# Patient Record
Sex: FEMALE | Race: WHITE | NOT HISPANIC OR LATINO | Employment: FULL TIME | URBAN - METROPOLITAN AREA
[De-identification: names, ages, dates, MRNs, and addresses within clinical notes are randomized per-mention and may not be internally consistent; named-entity substitution may affect disease eponyms.]

---

## 2017-08-22 ENCOUNTER — TRANSCRIBE ORDERS (OUTPATIENT)
Dept: GYNECOLOGIC ONCOLOGY | Facility: CLINIC | Age: 47
End: 2017-08-22

## 2017-08-22 DIAGNOSIS — Z12.31 VISIT FOR SCREENING MAMMOGRAM: Primary | ICD-10-CM

## 2017-09-06 ENCOUNTER — APPOINTMENT (OUTPATIENT)
Dept: MAMMOGRAPHY | Facility: HOSPITAL | Age: 47
End: 2017-09-06

## 2017-09-13 ENCOUNTER — OFFICE VISIT (OUTPATIENT)
Dept: OBSTETRICS AND GYNECOLOGY | Facility: CLINIC | Age: 47
End: 2017-09-13

## 2017-09-13 VITALS — BODY MASS INDEX: 23.98 KG/M2 | RESPIRATION RATE: 14 BRPM | HEIGHT: 66 IN | WEIGHT: 149.2 LBS

## 2017-09-13 DIAGNOSIS — L65.8 FEMALE PATTERN HAIR LOSS: Primary | ICD-10-CM

## 2017-09-13 PROCEDURE — 99214 OFFICE O/P EST MOD 30 MIN: CPT | Performed by: NURSE PRACTITIONER

## 2017-09-13 RX ORDER — SPIRONOLACTONE 25 MG/1
TABLET ORAL
Refills: 2 | COMMUNITY
Start: 2017-07-25 | End: 2022-07-19

## 2017-09-20 NOTE — PROGRESS NOTES
[facility] FO[facility]UP    Francesca Manning  [id number]  1970    Chief Complaint: Follow-up (Hair loss)        History of present illness:  Francesca Manning is a 46 y.o. year old female who is here today for c/o hair loss. She is a previous patient of Dr. Matilde Marquez, last seen for annual exam on 2016.   Francesca reports large amounts of hair loss that she first noticed in 10/2016. She suspects this was a side effect of Topamax, which she started on in 2016 for management of her migraines. She states she weaned off of the medication after the increased hair loss started and was evaluated by her neurologist, dermatologist, and integrative medicine providers. She has had several series of blood work drawn by these various providers starting in 10/2016 and has brought these labs with her for our review today. She had a low-normal TSH level at the end of , but this has since improved. She has been evaluated for thyroid dysfunction, anemia, and abnormal hormone levels. She was started on B12 injections and daily vitamin D supplementation following abnormal findings.   1 months ago she was prescribed spironolactone and Biotin for management of her hair loss by her dermatologist. She has taken this as instructed, but states she feels she was not adequately educated regarding this medication. She admits that she has noticed less hair falling out over the last 2 weeks. She denies hair loss in specific clumps.    Past Medical History:   Diagnosis Date   • Migraines        Past Surgical History:   Procedure Laterality Date   •  SECTION  2010    Boy: Hitesh   •  SECTION PRIMARY  2002    Boy: [place]   • CYSTOSCOPY      X 2   • EYE SURGERY         MEDICATIONS: The current medication list was reviewed and reconciled.     Allergies:  is allergic to sulfa antibiotics.    Family History   Problem Relation Age of Onset   • Breast cancer Father    • Diabetes Father    • Diabetes Paternal  "Grandmother    • Coronary artery disease Paternal Grandfather    • Stroke Paternal Grandfather    • Diabetes Maternal Grandmother    • Ovarian cancer Maternal Grandmother        Review of Systems   Constitutional: Negative for appetite change, chills, fatigue, fever and unexpected weight change.   HENT:        +hair loss x 1 year   Respiratory: Negative for cough, shortness of breath and wheezing.    Cardiovascular: Negative for chest pain, palpitations and leg swelling.   Gastrointestinal: Negative for abdominal distention, abdominal pain, blood in stool, constipation, diarrhea, nausea and vomiting.   Endocrine: Negative.    Genitourinary: Negative for dyspareunia, dysuria, frequency, genital sores, hematuria, pelvic pain, urgency, vaginal bleeding, vaginal discharge and vaginal pain.   Musculoskeletal: Negative for arthralgias, gait problem and joint swelling.   Neurological: Negative for dizziness, seizures, syncope, weakness, light-headedness, numbness and headaches.   Hematological: Negative for adenopathy.   Psychiatric/Behavioral: Negative.        Physical Exam  Vital Signs: Resp 14  Ht 66\" (167.6 cm)  Wt 149 lb 3.2 oz (67.7 kg)  LMP 07/16/2017 (Exact Date) Comment: Every 3 months  Breastfeeding? No  BMI 24.08 kg/m2   General Appearance:  alert, cooperative, no apparent distress, appears stated age and normal weight   Neurologic/Psychiatric: A&O x 3, gait steady, appropriate affect   HEENT:  Normocephalic, without obvious abnormality, mucous membranes moist. Hair mildly thick, thinner on top than at back, no evidence of bald patches.   Lungs:   Clear to auscultation bilaterally; respirations regular, even, and unlabored bilaterally   Heart:  Regular rate and rhythm, no murmurs appreciated   Abdomen:   Soft, non-tender, non-distended and no organomegaly   Lymph nodes: No cervical, supraclavicular, inguinal or axillary adenopathy noted   Extremities: Normal, atraumatic; no clubbing, cyanosis, or edema  "       Procedure Notes:  No notes on file    Assessment and Plan:    Francesca was seen today for follow-up.    Diagnoses and all orders for this visit:    Female pattern hair loss      Francesca and I discussed that her symptoms are consistent with female pattern hair loss. Topamax does have a listed side effect of alopecia, so it is possible that this started or worsened this pattern. Her low TSH may have also contributed at that time. I typically recommend starting spironolactone and Biotin, which she has been taking already x1 month. She has noticed slowing of her hair loss over the last 2 weeks and I reassured her that it seems her current treatment is helping.   We reviewed activity of spironolactone, risks, benefits, and side effects. Refills are given. We may consider Rogaine treatment if symptoms persist. We also discussed her normal life cycle for hair. It may take up to 3 months for her to start seeing new hair growth. She v/u.   She will be due for her annual exam in 3-4 months. We will re-evaluate her symptoms and management at that time.     This was a 30 minute visit with 20 minutes spent in direct face-to-face discussion of symptoms, review of outside labs, and treatment discussion.     Return for annual exam or PRN.      TERE Mcmahan      Note: Speech recognition transcription software was used to dictate portions of this document.  An attempt at proofreading has been made though minor errors in transcription may still be present.  Please do not hesitate to call our office with any questions.

## 2017-10-02 ENCOUNTER — APPOINTMENT (OUTPATIENT)
Dept: MAMMOGRAPHY | Facility: HOSPITAL | Age: 47
End: 2017-10-02

## 2017-10-20 ENCOUNTER — HOSPITAL ENCOUNTER (OUTPATIENT)
Dept: MAMMOGRAPHY | Facility: HOSPITAL | Age: 47
Discharge: HOME | End: 2017-10-20
Admitting: NURSE PRACTITIONER

## 2017-10-20 DIAGNOSIS — Z12.31 VISIT FOR SCREENING MAMMOGRAM: ICD-10-CM

## 2017-10-20 PROCEDURE — 77063 BREAST TOMOSYNTHESIS BI: CPT

## 2017-10-20 PROCEDURE — G0202 SCR MAMMO BI INCL CAD: HCPCS

## 2017-10-23 PROCEDURE — 77063 BREAST TOMOSYNTHESIS BI: CPT | Performed by: RADIOLOGY

## 2017-10-23 PROCEDURE — 77067 SCR MAMMO BI INCL CAD: CPT | Performed by: RADIOLOGY

## 2018-01-03 ENCOUNTER — OFFICE VISIT (OUTPATIENT)
Dept: OBSTETRICS AND GYNECOLOGY | Facility: CLINIC | Age: 48
End: 2018-01-03

## 2018-01-03 VITALS
WEIGHT: 143.2 LBS | OXYGEN SATURATION: 99 % | RESPIRATION RATE: 14 BRPM | HEIGHT: 66 IN | BODY MASS INDEX: 23.01 KG/M2 | DIASTOLIC BLOOD PRESSURE: 80 MMHG | HEART RATE: 106 BPM | SYSTOLIC BLOOD PRESSURE: 120 MMHG

## 2018-01-03 DIAGNOSIS — Z30.41 ENCOUNTER FOR SURVEILLANCE OF CONTRACEPTIVE PILLS: ICD-10-CM

## 2018-01-03 DIAGNOSIS — L65.9 HAIR LOSS: ICD-10-CM

## 2018-01-03 DIAGNOSIS — Z01.419 WELL WOMAN EXAM WITH ROUTINE GYNECOLOGICAL EXAM: Primary | ICD-10-CM

## 2018-01-03 PROCEDURE — 99396 PREV VISIT EST AGE 40-64: CPT | Performed by: NURSE PRACTITIONER

## 2018-01-03 RX ORDER — BUPROPION HYDROCHLORIDE 150 MG/1
TABLET ORAL
Refills: 0 | COMMUNITY
Start: 2017-12-26 | End: 2022-06-16

## 2018-01-03 RX ORDER — LEVONORGESTREL AND ETHINYL ESTRADIOL 0.15-0.03
1 KIT ORAL DAILY
Qty: 91 TABLET | Refills: 3 | Status: SHIPPED | OUTPATIENT
Start: 2018-01-03 | End: 2019-01-07 | Stop reason: SDUPTHER

## 2018-01-03 RX ORDER — FERROUS SULFATE 325(65) MG
325 TABLET ORAL
COMMUNITY

## 2018-01-03 NOTE — PROGRESS NOTES
[facility] ANNUAL WELL [facility]OMAN VISIT      Francesca Manning  [id number]  1970      Chief Complaint: Gynecologic Exam        History of present illness:    Francesca Manning is a 47 y.o. year old  presenting to be seen for her annual exam. She was last seen in 2017 for c/o hair loss and had just started treatment with spironolactone. Upon arrival today she reports she has been using the medication regularly, is taking Biotin, and is using a shampoo daily to encourage hair growth. She has seen a decrease in her hair loss and is now noticing new growth. She is happy with her current regimen and plans to continue this. She is on a new anxiety medication she is pleased with and due to follow-up for this next week.   She states she is in need of repeat routine blood work. She did not do her B12 injection last week, but has continued her daily vitamins. She reports she is feeling generally well today and has no new complaints. She denies vaginal bleeding, pelvic pain, changes in bowel or bladder function, new or concerning lesions, and breast problems. She is in need of a pap smear today. Her mammogram is currently UTD.        SEXUAL Hx:  She is currently sexually active.  In the past year there has not been new sexual partners.    Condoms are not typically used.  She would not like to be screened for STD's at today's exam.  Current birth control method: OCP (estrogen/progesterone), extended.   She is happy with her current method of contraception and does not want to discuss alternative methods of contraception.  MENSTRUAL Hx:  Patient's last menstrual period was 10/03/2017 (approximate).  In the past 6 months her cycles have been regular, predictable and occur monthly.   Her menstrual flow is normal.   Each month on average there are roughly 0 days of very heavy flow.    Intermenstrual bleeding is absent.    Post-coital bleeding is absent.  Dysmenorrhea: is not affecting her activities of daily  living  PMS: is not affecting her activities of daily living  Her cycles are not a source of concern for her that she wishes to discuss today.  HEALTH Hx:  She exercises regularly: no (and has no plans to become more active).  She wears her seat belt:yes.  She has concerns about domestic violence: no.  She is a non-smoker.      Past Medical History:   Diagnosis Date   • Migraines        Past Surgical History:   Procedure Laterality Date   •  SECTION  2010    Boy: Hitesh   •  SECTION PRIMARY  2002    Boy: [place]   • CYSTOSCOPY      X 2   • EYE SURGERY         MEDICATIONS: The current medication list was reviewed and reconciled.     Allergies:  is allergic to sulfa antibiotics.    Family History   Problem Relation Age of Onset   • Breast cancer Father 68   • Diabetes Father    • Diabetes Paternal Grandmother    • Coronary artery disease Paternal Grandfather    • Stroke Paternal Grandfather    • Diabetes Maternal Grandmother    • Ovarian cancer Maternal Grandmother        Health Maintenance:  Last mammogram was 10/20/2017. Last pap smear was 2014, results were  normal PAP..    Review of Systems   Constitutional: Negative for fatigue, fever and unexpected weight change.   HENT: Negative for congestion, ear pain, hearing loss, sinus pressure and trouble swallowing.    Eyes: Negative for visual disturbance.   Respiratory: Negative for cough, chest tightness, shortness of breath and wheezing.    Cardiovascular: Negative for chest pain, palpitations and leg swelling.   Gastrointestinal: Negative for abdominal distention, abdominal pain, constipation, diarrhea, nausea and vomiting.   Endocrine: Negative for cold intolerance, heat intolerance, polydipsia, polyphagia and polyuria.   Genitourinary: Negative for difficulty urinating, dyspareunia, dysuria, frequency, hematuria, pelvic pain, urgency, vaginal bleeding, vaginal discharge and vaginal pain.   Musculoskeletal: Negative for arthralgias,  "gait problem, joint swelling and myalgias.   Skin: Negative for color change, pallor and rash.   Neurological: Negative for dizziness, seizures, syncope, weakness, light-headedness, numbness and headaches.   Hematological: Negative for adenopathy. Does not bruise/bleed easily.   Psychiatric/Behavioral: Negative for agitation, confusion, sleep disturbance and suicidal ideas. The patient is not nervous/anxious.        Physical Exam  Vital Signs: /80  Pulse 106  Resp 14  Ht 167.6 cm (66\")  Wt 65 kg (143 lb 3.2 oz)  LMP 10/03/2017 (Approximate)  SpO2 99%  Breastfeeding? No  BMI 23.11 kg/m2   General Appearance:  alert, cooperative, no apparent distress, appears stated age and normal weight   Neurologic/Psychiatric: A&O x 3, gait steady, appropriate affect   HEENT:  Normocephalic, without obvious abnormality, mucous membranes moist. New hair growth noted throughout, but especially to top of head, improved from last exam   Neck: Supple, symmetrical, trachea midline, no adenopathy;  No thyromegaly, masses, or tenderness   Back:   Symmetric, no curvature, ROM normal, no CVA tenderness   Lungs:   Clear to auscultation bilaterally; respirations regular, even, and unlabored bilaterally   Heart:  Regular rate and rhythm, no murmurs appreciated   Breasts:  Symmetrical, no masses, no lesions and no nipple discharge   Abdomen:   Soft, non-tender, non-distended and no organomegaly   Lymph nodes: No cervical, supraclavicular, inguinal or axillary adenopathy noted   Extremities: Normal, atraumatic; no clubbing, cyanosis, or edema    Skin: No rashes, ulcers, or suspicious lesions noted   Pelvic: External Genitalia  without lesions or skin changes  Vagina  is pink, moist, without lesions.   Cervix  normal, without lesions, no discharge, no CMT and pap obtained  Uterus  normal size, midposition, mobile and nontender  Ovaries  without palpable masses or fullness  Parametria  smooth  Rectovaginal  Female rectovaginal: " deferred       Procedure Note:  No notes on file    Assessment and Plan:    Francesca was seen today for gynecologic exam.    Diagnoses and all orders for this visit:    Well woman exam with routine gynecological exam  -     Pap IG, HPV-hr - ThinPrep Vial, Cervix; Future  -     Testosterone, Free, Total; Future  -     TSH; Future  -     Vitamin D 25 Hydroxy; Future  -     Vitamin B12; Future  -     Hemoglobin A1c; Future  -     CBC & Differential; Future  -     Comprehensive Metabolic Panel; Future  -     Pap IG, HPV-hr - ThinPrep Vial, Cervix    Hair loss  Comments:  improving on current regimen    Encounter for surveillance of contraceptive pills  -     levonorgestrel-ethinyl estradiol (JOLESSA) 0.15-0.03 MG per tablet; Take 1 tablet by mouth Daily.        Pap was done today.  If she does not receive the results of the Pap within 2 weeks  time, she was instructed to call to find out the results.  I explained to Francesca that the recommendations for Pap smear interval in a low risk patient's has lengthened to 3 years time.  I encouraged her to be seen yearly for a full physical exam including breast and pelvic exam even during the off years when PAP's will not be performed.    She was encouraged to get yearly mammograms.  She should report any palpable breast lump(s) or skin changes regardless of mammographic findings.  I explained to Francesca that notification regarding her mammogram results will come from the center performing the study.  Our office will not be routinely calling with mammogram results.  It is her responsibility to make sure that the results from the mammogram are communicated to her by the breast center.  If she has any questions about the results, she is welcome to call our office anytime.    She was recommended to begin colonoscopy at age 50 for colon cancer screening and bone density scans (DEXA) at age 60-65 for osteoporosis screening.      Francesca and I discussed evidence of new hair growth and she has seen  the rapid hair loss stop. This is reassuring. I encouraged her to stay on her current regimen for at least 3 more months. She v/u and will call with any problems.     Routine annual blood work and follow-up blood work from 6 months ago ordered today. She will be notified of all results once they return.     Return in about 1 year (around 1/3/2019) for annual exam or PRN.      TERE Mcmahan      Note: Speech recognition transcription software was used to dictate portions of this document.  An attempt at proofreading has been made though minor errors in transcription may still be present.  Please do not hesitate to call our office with any questions.

## 2018-01-09 ENCOUNTER — TELEPHONE (OUTPATIENT)
Dept: GYNECOLOGIC ONCOLOGY | Facility: CLINIC | Age: 48
End: 2018-01-09

## 2018-01-09 NOTE — TELEPHONE ENCOUNTER
Called patient to review recent labs. All WNL, except B12 is >1500. We discussed plan to stop B12 monthly injections for now and to use daily supplement every other day. Repeat lab in 6 months. Otherwise, no changes. RTC as scheduled unless new problems. She v/u.

## 2018-04-10 ENCOUNTER — TELEPHONE (OUTPATIENT)
Dept: OBSTETRICS AND GYNECOLOGY | Facility: CLINIC | Age: 48
End: 2018-04-10

## 2018-04-10 NOTE — TELEPHONE ENCOUNTER
Provider Name  Hiwot Crowley    Reason for Call  Irregular bleeding, has questions    Pharmacy Name  [facility] in [place], [place]    Call Back Number  [phone]

## 2018-04-11 NOTE — TELEPHONE ENCOUNTER
Patient called to report last period was very heavy with large clots and cramping x 1st day. This was very abnormal for her as she is using OCPs regularly and periods are typically very light and easy. Day 2 was lighter and more normal for her, but then day 3 was moderate. She states symptoms are better as of yesterday and today.   Patient reassured that this is likely an occurrence of abnormal or heavy perimenopausal bleeding. This is a single occurrence and is ok to be monitored for now. If bleeding states to become irregular, heavy, and frequent often she may require additional work up or management. She is encouraged to notify me if any further problems. She states she feels fine to monitor this for now.

## 2018-11-20 ENCOUNTER — TRANSCRIBE ORDERS (OUTPATIENT)
Dept: ADMINISTRATIVE | Facility: HOSPITAL | Age: 48
End: 2018-11-20

## 2018-11-20 ENCOUNTER — TELEPHONE (OUTPATIENT)
Dept: OBSTETRICS AND GYNECOLOGY | Facility: CLINIC | Age: 48
End: 2018-11-20

## 2018-11-20 DIAGNOSIS — Z12.31 VISIT FOR SCREENING MAMMOGRAM: Primary | ICD-10-CM

## 2018-11-20 NOTE — TELEPHONE ENCOUNTER
Provider Name  Dr Narayanan    Reason for Call  Patient has family history of breast cancer, Mom recently diagnosed with breast cancer, patient called [facility] to schedule yearly mammogram, was scheduled  On January 4, 2019, patient would like to get mammogram sooner, wants to know if it would be okay to get mammogram in [place], [place]    Call Back Number  [phone]

## 2018-12-31 RX ORDER — LEVONORGESTREL AND ETHINYL ESTRADIOL 0.15-0.03
KIT ORAL
Qty: 91 TABLET | Refills: 2 | OUTPATIENT
Start: 2018-12-31

## 2019-01-03 RX ORDER — LEVONORGESTREL AND ETHINYL ESTRADIOL 0.15-0.03
KIT ORAL
Qty: 91 TABLET | Refills: 2 | OUTPATIENT
Start: 2019-01-03

## 2019-01-04 ENCOUNTER — HOSPITAL ENCOUNTER (OUTPATIENT)
Dept: MAMMOGRAPHY | Facility: HOSPITAL | Age: 49
Discharge: HOME | End: 2019-01-04
Attending: OBSTETRICS & GYNECOLOGY | Admitting: OBSTETRICS & GYNECOLOGY

## 2019-01-04 DIAGNOSIS — Z12.31 VISIT FOR SCREENING MAMMOGRAM: ICD-10-CM

## 2019-01-04 PROCEDURE — 77067 SCR MAMMO BI INCL CAD: CPT

## 2019-01-04 PROCEDURE — 77063 BREAST TOMOSYNTHESIS BI: CPT

## 2019-01-04 PROCEDURE — 77063 BREAST TOMOSYNTHESIS BI: CPT | Performed by: RADIOLOGY

## 2019-01-04 PROCEDURE — 77067 SCR MAMMO BI INCL CAD: CPT | Performed by: RADIOLOGY

## 2019-01-07 ENCOUNTER — OFFICE VISIT (OUTPATIENT)
Dept: OBSTETRICS AND GYNECOLOGY | Facility: CLINIC | Age: 49
End: 2019-01-07

## 2019-01-07 VITALS — WEIGHT: 148 LBS | DIASTOLIC BLOOD PRESSURE: 62 MMHG | BODY MASS INDEX: 23.89 KG/M2 | SYSTOLIC BLOOD PRESSURE: 118 MMHG

## 2019-01-07 DIAGNOSIS — Z01.419 WELL WOMAN EXAM: Primary | ICD-10-CM

## 2019-01-07 PROCEDURE — 99396 PREV VISIT EST AGE 40-64: CPT | Performed by: OBSTETRICS & GYNECOLOGY

## 2019-01-07 RX ORDER — SPIRONOLACTONE 25 MG/1
1 TABLET ORAL DAILY
COMMUNITY
Start: 2018-07-23 | End: 2019-01-07 | Stop reason: SDUPTHER

## 2019-01-07 RX ORDER — SUMATRIPTAN 50 MG/1
TABLET, FILM COATED ORAL
COMMUNITY
Start: 2018-10-13

## 2019-01-07 RX ORDER — CHLORAL HYDRATE 500 MG
CAPSULE ORAL
COMMUNITY
End: 2021-03-09

## 2019-01-07 RX ORDER — LEVONORGESTREL AND ETHINYL ESTRADIOL 0.15-0.03
1 KIT ORAL DAILY
Qty: 91 TABLET | Refills: 3 | Status: SHIPPED | OUTPATIENT
Start: 2019-01-07 | End: 2019-12-30

## 2019-01-07 NOTE — PROGRESS NOTES
Subjective   Chief Complaint   Patient presents with   • Gynecologic Exam     Francesca Manning is a 48 y.o. year old  presenting to be seen for her annual exam.     SEXUAL Hx:  She is currently sexually active.  In the past year there there has been NO new sexual partners.    Condoms are never used.  She would not like to be screened for STD's at today's exam.  Current birth control method: OCP (estrogen/progesterone).  She is happy with her current method of contraception and does not want to discuss alternative methods of contraception.  MENSTRUAL Hx:  Patient's last menstrual period was 2019.  In the past 6 months her cycles have been regular menses every 3 months while on COCP. Her menstrual flow is typically normal.   Each month on average there are roughly 0 day(s) of very heavy flow.    Intermenstrual bleeding is absent.    Post-coital bleeding is absent.  Dysmenorrhea: none  PMS: none  Her cycles are not a source of concern for her that she wishes to discuss today.  HEALTH Hx:  She exercises regularly: no (but is planning to start exercising more ).  She wears her seat belt: yes.  She has concerns about domestic violence: no.  OTHER THINGS SHE WANTS TO DISCUSS TODAY:  Nothing else    The following portions of the patient's history were reviewed and updated as appropriate:problem list, current medications, allergies, past family history, past medical history, past social history and past surgical history.    Social History    Tobacco Use      Smoking status: Never Smoker      Smokeless tobacco: Never Used    Review of Systems  Constitutional POS: nothing reported    NEG: anorexia or night sweats   Genitourinary POS: nothing reported    NEG: dysuria or hematuria      Gastointestinal POS: nothing reported    NEG: bloating, change in bowel habits, melena or reflux symptoms   Integument POS: nothing reported    NEG: moles that are changing in size, shape, color or rashes   Breast POS: nothing reported     NEG: persistent breast lump, skin dimpling or nipple discharge        Objective   /62 (Patient Position: Sitting)   Wt 67.1 kg (148 lb)   LMP 01/01/2019   BMI 23.89 kg/m²     General:  well developed; well nourished  no acute distress   Skin:  No suspicious lesions seen   Thyroid: normal to inspection and palpation   Breasts:  Examined in supine position  Symmetric without masses or skin dimpling  Nipples normal without inversion, lesions or discharge  There are no palpable axillary nodes   Abdomen: soft, non-tender; no masses  no umbilical or inguinal hernias are present  no hepato-splenomegaly   Pelvis: Clinical staff was present for exam  External genitalia:  normal appearance of the external genitalia including Bartholin's and [place]'s glands.  :  urethral meatus normal;  Vaginal:  normal pink mucosa without prolapse or lesions.  Cervix:  normal appearance.  Uterus:  normal size, shape and consistency.  Adnexa:  normal bimanual exam of the adnexa.  Rectal:  digital rectal exam not performed; anus visually normal appearing.        Assessment   1. Normal GYN exam  2. History of breast cancer in mother and father - 5 year mnie risk 1.7%, lifetime 16.3%     Plan   1. Pap and HPV were done today.  If she does not receive the results of the Pap within 2 weeks  time, she was instructed to call to find out the results.  I explained to Francesca that the recommendations for Pap smear interval in a low risk patient's has lengthened to 5 years time if both cytology and HPV testing were normal.  I encouraged her to be seen yearly for a full physical exam including breast and pelvic exam even during the off years when PAP's will not be performed.  2. Discussed she is not currently in the category for annual MRI screening but encouraged continued yearly mammograms. If 5 year mine risk score gets above 1.7% moving forward could consider tamoxifen.   3. Prescription(s) for OCP's were refilled today   4. The importance  of keeping all planned follow-up and taking all medications as prescribed was emphasized.  5. Follow up for annual exam in one year    New Medications Ordered This Visit   Medications   • levonorgestrel-ethinyl estradiol (JOLESSA) 0.15-0.03 MG per tablet     Sig: Take 1 tablet by mouth Daily.     Dispense:  91 tablet     Refill:  3          This note was electronically signed.    Jen Narayanan MD  January 7, 2019    Note: Speech recognition transcription software may have been used to create portions of this document.  An attempt at proofreading has been made but errors in transcription could still be present.

## 2019-12-03 ENCOUNTER — TRANSCRIBE ORDERS (OUTPATIENT)
Dept: ADMINISTRATIVE | Facility: HOSPITAL | Age: 49
End: 2019-12-03

## 2019-12-03 DIAGNOSIS — Z12.31 VISIT FOR SCREENING MAMMOGRAM: Primary | ICD-10-CM

## 2019-12-30 RX ORDER — LEVONORGESTREL AND ETHINYL ESTRADIOL 0.15-0.03
KIT ORAL
Qty: 91 TABLET | Refills: 2 | Status: SHIPPED | OUTPATIENT
Start: 2019-12-30 | End: 2020-10-06

## 2020-01-22 ENCOUNTER — OFFICE VISIT (OUTPATIENT)
Dept: OBSTETRICS AND GYNECOLOGY | Facility: CLINIC | Age: 50
End: 2020-01-22

## 2020-01-22 VITALS
HEIGHT: 66 IN | BODY MASS INDEX: 23.98 KG/M2 | WEIGHT: 149.2 LBS | SYSTOLIC BLOOD PRESSURE: 112 MMHG | DIASTOLIC BLOOD PRESSURE: 78 MMHG

## 2020-01-22 DIAGNOSIS — Z01.419 WELL WOMAN EXAM: Primary | ICD-10-CM

## 2020-01-22 DIAGNOSIS — Z80.3 FAMILY HISTORY OF BREAST CANCER: ICD-10-CM

## 2020-01-22 PROCEDURE — 99396 PREV VISIT EST AGE 40-64: CPT | Performed by: OBSTETRICS & GYNECOLOGY

## 2020-01-22 NOTE — PROGRESS NOTES
Subjective   Chief Complaint   Patient presents with   • Gynecologic Exam     pt here for annual. last pap 19 negative. mammo 19 negative, has a mammo scheduled for 20.      Francesca Manning is a 49 y.o. year old  presenting to be seen for her annual exam.     SEXUAL Hx:  She is currently sexually active.  In the past year there there has been NO new sexual partners.    Condoms are never used.  She would not like to be screened for STD's at today's exam.  Current birth control method: OCP (estrogen/progesterone).  She is happy with her current method of contraception and does not want to discuss alternative methods of contraception.  MENSTRUAL Hx:  Patient's last menstrual period was 2019 (within days).  In the past 6 months her cycles have been regular, predictable and occur monthly.  Her menstrual flow is typically normal.   Each month on average there are roughly 0 day(s) of very heavy flow.    Duration 5 days   Intermenstrual bleeding is absent.    Post-coital bleeding is absent.  Dysmenorrhea: none  PMS: None.   Her cycles are not a source of concern for her that she wishes to discuss today.  HEALTH Hx:  She exercises regularly: no (but is planning to start exercising more ).  She wears her seat belt: yes.  She has concerns about domestic violence: no.  OTHER THINGS SHE WANTS TO DISCUSS TODAY:  Nothing else    The following portions of the patient's history were reviewed and updated as appropriate:problem list, current medications, allergies, past family history, past medical history, past social history and past surgical history.    Social History    Tobacco Use      Smoking status: Never Smoker      Smokeless tobacco: Never Used    Review of Systems  Constitutional POS: nothing reported    NEG: anorexia or night sweats   Genitourinary POS: nothing reported    NEG: dysuria or hematuria      Gastointestinal POS: nothing reported    NEG: bloating, change in bowel habits, melena or reflux  "symptoms   Integument POS: nothing reported    NEG: moles that are changing in size, shape, color or rashes   Breast POS: nothing reported    NEG: persistent breast lump, skin dimpling or nipple discharge        Objective   /78   Ht 167.6 cm (66\")   Wt 67.7 kg (149 lb 3.2 oz)   LMP 12/30/2019 (Within Days)   Breastfeeding No   BMI 24.08 kg/m²     General:  well developed; well nourished  no acute distress   Skin:  No suspicious lesions seen   Thyroid: normal to inspection and palpation   Breasts:  Examined in supine position  Symmetric without masses or skin dimpling  Nipples normal without inversion, lesions or discharge  There are no palpable axillary nodes   Abdomen: soft, non-tender; no masses  no umbilical or inguinal hernias are present  no hepato-splenomegaly   Pelvis: Clinical staff was present for exam  External genitalia:  normal appearance of the external genitalia including Bartholin's and [place]'s glands.  :  urethral meatus normal;  Vaginal:  normal pink mucosa without prolapse or lesions.  Cervix:  normal appearance. ectropian present;  Uterus:  normal size, shape and consistency.  Adnexa:  normal bimanual exam of the adnexa.  Rectal:  digital rectal exam not performed; anus visually normal appearing.        Assessment   1. Normal GYN exam     Plan   Pap was not done today.  I explained to Francesca that the recommendations for Pap smear interval in a low risk patient has lengthened to 3 years time.  I told Francesca she still needs to be seen in our office yearly for a full physical including breast and pelvic exam.  She was encouraged to get yearly mammograms.  She should report any palpable breast lump(s) or skin changes regardless of mammographic findings.  I explained to Francesca that notification regarding her mammogram results will come from the center performing the study.  Our office will not be routinely calling with mammogram results.  It is her responsibility to make sure that the results " from the mammogram are communicated to her by the breast center.  If she has any questions about the results, she is welcome to call our office anytime.  Reviewed recommendation to come off of combined hormonal contraceptive pill within the next 1 to 3 years.  She will think about this in regards to contraception moving forward.  Reviewed with patient that if we wanted to do an FSH would need to be drawn at least 3 to 4 months after stopping pills.  In addition menopause will be defined after going 1 year without a period while off birth control pills.  1. The importance of keeping all planned follow-up and taking all medications as prescribed was emphasized.  2. Follow up for annual exam in one year    No orders of the defined types were placed in this encounter.         This note was electronically signed.    Jen Narayanan MD  January 22, 2020    Note: Speech recognition transcription software may have been used to create portions of this document.  An attempt at proofreading has been made but errors in transcription could still be present.

## 2020-02-17 ENCOUNTER — HOSPITAL ENCOUNTER (OUTPATIENT)
Dept: MAMMOGRAPHY | Facility: HOSPITAL | Age: 50
Discharge: HOME | End: 2020-02-17
Admitting: OBSTETRICS & GYNECOLOGY

## 2020-02-17 DIAGNOSIS — Z12.31 VISIT FOR SCREENING MAMMOGRAM: ICD-10-CM

## 2020-02-17 PROCEDURE — 77067 SCR MAMMO BI INCL CAD: CPT

## 2020-02-17 PROCEDURE — 77067 SCR MAMMO BI INCL CAD: CPT | Performed by: RADIOLOGY

## 2020-02-17 PROCEDURE — 77063 BREAST TOMOSYNTHESIS BI: CPT | Performed by: RADIOLOGY

## 2020-02-17 PROCEDURE — 77063 BREAST TOMOSYNTHESIS BI: CPT

## 2020-10-06 RX ORDER — LEVONORGESTREL / ETHINYL ESTRADIOL 0.15-0.03
KIT ORAL
Qty: 91 TABLET | Refills: 1 | Status: SHIPPED | OUTPATIENT
Start: 2020-10-06 | End: 2021-03-09 | Stop reason: SDUPTHER

## 2020-12-21 ENCOUNTER — TRANSCRIBE ORDERS (OUTPATIENT)
Dept: ADMINISTRATIVE | Facility: HOSPITAL | Age: 50
End: 2020-12-21

## 2020-12-21 DIAGNOSIS — Z12.31 VISIT FOR SCREENING MAMMOGRAM: Primary | ICD-10-CM

## 2021-02-19 ENCOUNTER — HOSPITAL ENCOUNTER (OUTPATIENT)
Dept: MAMMOGRAPHY | Facility: HOSPITAL | Age: 51
Discharge: HOME | End: 2021-02-19

## 2021-02-19 DIAGNOSIS — Z12.31 VISIT FOR SCREENING MAMMOGRAM: ICD-10-CM

## 2021-02-24 ENCOUNTER — TELEPHONE (OUTPATIENT)
Dept: OBSTETRICS AND GYNECOLOGY | Facility: CLINIC | Age: 51
End: 2021-02-24

## 2021-02-24 NOTE — TELEPHONE ENCOUNTER
DR HOWARD,        PATIENT CALLED WITH [facility], SHE TOOK HER FIRST COVID SHOT AND WAS TOLD WHEN SHE WENT TO TAKE HER MAMMOGRAM SHE WOULD HAVE TO WAIT 4 TO WEEKS TO TAKE THE MAMMOGRAM. SO SHE SCHEDULED THE MAMMOGRAM THEN FOR ANOTHER TIME.     IT IS COMING UP TO THE SECOND DOSE SHOT OF THE COVID 19 AND HER MAMMOGRAM IS AFTER AND SHE IS AT A DELIMIA IF SHE SHOULD TALK THE COVID DOSE OR GET THE MAMMOGRAM DONE.       HER FAMILY HAS A HISTORY OF BREAST CANCER AND SHE DOES NOT KNOW WHAT IS THE MOST IMPORTANT THING TO DO.        PLEASE CALL AND GIVE CLARITY FOR PATIENT     THANK YOU

## 2021-02-24 NOTE — TELEPHONE ENCOUNTER
Pt states that when she went for her mammo they told her that since she had the covid vaccine and it had not been 4 weeks since she had it if there was any swelling in her lymph node her insurance would not pay for it. Pt cancelled her mammo and rescheduled but she is still having the pain in her underarm/ breast area that she is doing PT for.  Pt is due to get her second vaccine tomorrow but it nervouse about having to wait until April for her mammogram and is wanting to know if she should not get the vaccine and get her mammo moved up if possible. Pt states that her dad  from breast cancer and that her mother has breast cancer. Pt would like for you to call her and advise.

## 2021-03-09 ENCOUNTER — OFFICE VISIT (OUTPATIENT)
Dept: OBSTETRICS AND GYNECOLOGY | Facility: CLINIC | Age: 51
End: 2021-03-09

## 2021-03-09 VITALS
DIASTOLIC BLOOD PRESSURE: 84 MMHG | WEIGHT: 156 LBS | HEIGHT: 66 IN | BODY MASS INDEX: 25.07 KG/M2 | SYSTOLIC BLOOD PRESSURE: 126 MMHG

## 2021-03-09 DIAGNOSIS — Z01.419 WELL WOMAN EXAM WITH ROUTINE GYNECOLOGICAL EXAM: Primary | ICD-10-CM

## 2021-03-09 DIAGNOSIS — Z12.11 COLON CANCER SCREENING: ICD-10-CM

## 2021-03-09 DIAGNOSIS — N64.4 BREAST PAIN, RIGHT: ICD-10-CM

## 2021-03-09 DIAGNOSIS — Z80.3 FAMILY HISTORY OF BREAST CANCER: ICD-10-CM

## 2021-03-09 PROCEDURE — 99396 PREV VISIT EST AGE 40-64: CPT | Performed by: OBSTETRICS & GYNECOLOGY

## 2021-03-09 RX ORDER — LEVONORGESTREL AND ETHINYL ESTRADIOL 0.15-0.03
1 KIT ORAL DAILY
Qty: 91 TABLET | Refills: 3 | Status: SHIPPED | OUTPATIENT
Start: 2021-03-09 | End: 2022-03-31

## 2021-03-09 RX ORDER — METHOCARBAMOL 750 MG/1
TABLET, FILM COATED ORAL
COMMUNITY
Start: 2020-12-31 | End: 2022-01-26

## 2021-03-09 NOTE — PROGRESS NOTES
Subjective   Chief Complaint   Patient presents with   • Gynecologic Exam     annual. pap 19 negative. mammo 20 negative. mammo scheduled in April. c/o soreness under the right breast, in right arm going in to her underarm.     Francesca Manning is a 50 y.o. year old  presenting to be seen for her annual exam.     SEXUAL Hx:  She is currently sexually active.  In the past year there there has been NO new sexual partners.    Condoms are never used.  She would not like to be screened for STD's at today's exam.  Current birth control method: OCP (estrogen/progesterone).  She is happy with her current method of contraception and does not want to discuss alternative methods of contraception.  MENSTRUAL Hx:  No LMP recorded. (Menstrual status: Oral contraceptives).  In the past 6 months her cycles have been regular, predictable and occur ever 3 months.  Her menstrual flow is typically normal.   Each month on average there are roughly 0 day(s) of very heavy flow.  Duration 3 days.   Intermenstrual bleeding is absent.    Post-coital bleeding is absent.  Dysmenorrhea: none  PMS: none  Her cycles are not a source of concern for her that she wishes to discuss today.  HEALTH Hx:  She exercises regularly: yes.  She wears her seat belt: yes.  She has concerns about domestic violence: no.  OTHER THINGS SHE WANTS TO DISCUSS TODAY:  Patient recently had her second Covid vaccine and her screening mammogram got pushed April.  She has concerns because she reports around  she started developing some pain underneath her right upper arm.  She reports this also travels to just underneath her breast on the right side.  She reports she has seen her primary care physician and they ordered an MRI which was overall normal.  She is also been doing physical therapy for about 1 month but reports she is not noticed much improvement.  She denies any lumps or bumps in the breast and denies any abnormal discharge.  She is  "just very concerned given her mother and father's history of breast cancer.    The following portions of the patient's history were reviewed and updated as appropriate:problem list, current medications, allergies, past family history, past medical history, past social history and past surgical history.    Social History    Tobacco Use      Smoking status: Never Smoker      Smokeless tobacco: Never Used    Review of Systems  Constitutional POS: nothing reported    NEG: anorexia or night sweats   Genitourinary POS: unsure if leaking    NEG: dysuria, frequency or hematuria      Gastointestinal POS: nothing reported    NEG: bloating, change in bowel habits, melena or reflux symptoms   Integument POS: nothing reported    NEG: moles that are changing in size, shape, color or rashes   Breast POS: pain underneath right breast    NEG: persistent breast lump, skin dimpling or nipple discharge        Objective   /84   Ht 167.6 cm (66\")   Wt 70.8 kg (156 lb)   Breastfeeding No   BMI 25.18 kg/m²     General:  well developed; well nourished  no acute distress   Skin:  No suspicious lesions seen   Thyroid: normal to inspection and palpation   Breasts:  Examined in supine position  Symmetric without masses or skin dimpling  Nipples normal without inversion, lesions or discharge  There are no palpable axillary nodes  Pain present in right breast around 8 o'clock position about 4cm from areola   Abdomen: soft, non-tender; no masses  no umbilical or inguinal hernias are present  no hepato-splenomegaly   Pelvis: Clinical staff was present for exam  External genitalia:  normal appearance of the external genitalia including Bartholin's and [place]'s glands.  :  urethral meatus normal;  Vaginal:  normal pink mucosa without prolapse or lesions.  Cervix:  normal appearance.  Uterus:  normal size, shape and consistency.  Adnexa:  normal bimanual exam of the adnexa.  Rectal:  digital rectal exam not performed; anus visually normal " appearing.        Assessment   1. Normal GYN exam  2. Right breast pain  3. Family history of breast exam - she has known negative BRCA  4. Oral contraceptive pill surveillance      Plan   Pap was not done today.  I explained to Francesca that the recommendations for Pap smear interval in a low risk patient has lengthened to 5 years time if history of normal pap co test.  I told Francesca she still needs to be seen in our office yearly for a full physical including breast and pelvic exam.  Bilateral diagnostic mammogram ordered given #2  She was encouraged to get yearly mammograms.  She should report any palpable breast lump(s) or skin changes regardless of mammographic findings.  I explained to Francesca that notification regarding her mammogram results will come from the center performing the study.  Our office will not be routinely calling with mammogram results.  It is her responsibility to make sure that the results from the mammogram are communicated to her by the breast center.  If she has any questions about the results, she is welcome to call our office anytime.  Colonoscopy was recommended for screening for colon cancer.  The procedure was briefly discussed and its benefits for early detection of colon cancer were emphasized.  I explained to Francesca that we could help her to schedule it if she wishes.  Additionally, she could also contact her primary care physician to help make this arrangement.  After considering these options she wants help setting up her colonoscopy.  Referral will be made to Dr. Reyes for outpatient colonoscopy.  Prescription(s) for OCP's were refilled today.  Reviewed at some point within the next year to 2 to 3 years we will plan to transition off oral contraceptive pills given her age.  Reviewed other options to last until menopause would include the levonorgestrel intrauterine device.  Booklet provided.  AMEILE age 60-65.  The importance of keeping all planned follow-up and taking all  medications as prescribed was emphasized.  Follow up for annual exam in one year    New Medications Ordered This Visit   Medications   • levonorgestrel-ethinyl estradiol (Jolessa) 0.15-0.03 MG per tablet     Sig: Take 1 tablet by mouth Daily.     Dispense:  91 tablet     Refill:  3          This note was electronically signed.    Jen Narayanan MD  March 9, 2021    Note: Speech recognition transcription software may have been used to create portions of this document.  An attempt at proofreading has been made but errors in transcription could still be present.

## 2021-04-08 ENCOUNTER — HOSPITAL ENCOUNTER (OUTPATIENT)
Dept: MAMMOGRAPHY | Facility: HOSPITAL | Age: 51
Discharge: HOME | End: 2021-04-08

## 2021-04-08 ENCOUNTER — TELEPHONE (OUTPATIENT)
Dept: OBSTETRICS AND GYNECOLOGY | Facility: CLINIC | Age: 51
End: 2021-04-08

## 2021-04-08 ENCOUNTER — HOSPITAL ENCOUNTER (OUTPATIENT)
Dept: ULTRASOUND IMAGING | Facility: HOSPITAL | Age: 51
Discharge: HOME | End: 2021-04-08

## 2021-04-08 DIAGNOSIS — N64.4 BREAST PAIN, RIGHT: ICD-10-CM

## 2021-04-08 PROCEDURE — 77066 DX MAMMO INCL CAD BI: CPT

## 2021-04-08 PROCEDURE — 76642 ULTRASOUND BREAST LIMITED: CPT | Performed by: RADIOLOGY

## 2021-04-08 PROCEDURE — G0279 TOMOSYNTHESIS, MAMMO: HCPCS

## 2021-04-08 PROCEDURE — 77062 BREAST TOMOSYNTHESIS BI: CPT | Performed by: RADIOLOGY

## 2021-04-08 PROCEDURE — 76642 ULTRASOUND BREAST LIMITED: CPT

## 2021-04-08 PROCEDURE — 77066 DX MAMMO INCL CAD BI: CPT | Performed by: RADIOLOGY

## 2021-05-19 ENCOUNTER — OFFICE VISIT (OUTPATIENT)
Dept: OBSTETRICS AND GYNECOLOGY | Facility: CLINIC | Age: 51
End: 2021-05-19

## 2021-05-19 VITALS
SYSTOLIC BLOOD PRESSURE: 124 MMHG | BODY MASS INDEX: 24.72 KG/M2 | HEIGHT: 66 IN | DIASTOLIC BLOOD PRESSURE: 86 MMHG | WEIGHT: 153.8 LBS

## 2021-05-19 DIAGNOSIS — N64.4 BREAST PAIN: Primary | ICD-10-CM

## 2021-05-19 PROCEDURE — 99213 OFFICE O/P EST LOW 20 MIN: CPT | Performed by: OBSTETRICS & GYNECOLOGY

## 2021-05-19 RX ORDER — FAMOTIDINE 40 MG/1
TABLET, FILM COATED ORAL
COMMUNITY
Start: 2021-05-05 | End: 2022-09-16

## 2021-05-23 NOTE — PROGRESS NOTES
"Subjective   Chief Complaint   Patient presents with   • Gynecologic Exam     ANITA pt; breast exam;f/u from diagnostic mammogram     Francesca Manning is a 50 y.o. year old .  No LMP recorded (lmp unknown). (Menstrual status: Oral contraceptives).  She presents to be seen because of follow up breast exam. Patient was seen for annual examination in early March with Dr. Narayanan. At the time, she reported right breast pain. On examination, she was noted to have focal tenderness at the 8 o'clock position about 4 cm from the areola in the right breast. A diagnostic mammogram was ordered and an ultrasound was also performed that were all normal. She reports that she had recently found out that she has some herniated discs and has been undergoing physical therapy. She reports through this she has noticed resolution of her breast pain.    OTHER THINGS SHE WANTS TO DISCUSS TODAY:  Nothing else    The following portions of the patient's history were reviewed and updated as appropriate:current medications and allergies    Social History    Tobacco Use      Smoking status: Never Smoker      Smokeless tobacco: Never Used    Review of Systems  Constitutional POS: nothing reported    NEG: anorexia or night sweats   Genitourinary POS: nothing reported    NEG: dysuria or hematuria   Gastointestinal POS: nothing reported    NEG: bloating, change in bowel habits, melena or reflux symptoms   Integument POS: nothing reported    NEG: moles that are changing in size, shape, color or rashes   Breast POS: nothing reported    NEG: persistent breast lump, skin dimpling or nipple discharge         Objective   /86   Ht 167.6 cm (66\")   Wt 69.8 kg (153 lb 12.8 oz)   LMP  (LMP Unknown)   Breastfeeding No   BMI 24.82 kg/m²     General:  well developed; well nourished  no acute distress   Skin:  Not performed.   Thyroid: not examined   Lungs:  breathing is unlabored   Heart:  Not performed.   Breasts:  Examined in supine " position  Symmetric without masses or skin dimpling  Nipples normal without inversion, lesions or discharge  There are no palpable axillary nodes   Abdomen: Not performed.   Pelvis: Not performed.     Lab Review   No data reviewed    Imaging   Mammogram report        Assessment   1. Focal Breast Pain -- resolved     Plan   1. Breast pain has resolved. Imaging normal. Patient instructed to call with return of symptoms or any breast changes.  2. The importance of keeping all planned follow-up and taking all medications as prescribed was emphasized.  3. Follow up for clinical breast exam in 6 months.      No orders of the defined types were placed in this encounter.         This note was electronically signed.    Jacklyn Sagastume, DO  May 19, 2021    Note: Speech recognition transcription software may have been used to create portions of this document.  An attempt at proofreading has been made but errors in transcription could still be present.

## 2021-06-14 ENCOUNTER — APPOINTMENT (OUTPATIENT)
Dept: PREADMISSION TESTING | Facility: HOSPITAL | Age: 51
End: 2021-06-14

## 2021-06-17 RX ORDER — SODIUM, POTASSIUM,MAG SULFATES 17.5-3.13G
2 SOLUTION, RECONSTITUTED, ORAL ORAL TAKE AS DIRECTED
Qty: 354 ML | Refills: 0 | Status: SHIPPED | OUTPATIENT
Start: 2021-06-17 | End: 2022-01-26

## 2022-01-19 ENCOUNTER — TELEPHONE (OUTPATIENT)
Dept: OBSTETRICS AND GYNECOLOGY | Facility: CLINIC | Age: 52
End: 2022-01-19

## 2022-01-19 NOTE — TELEPHONE ENCOUNTER
catrina      Pt has had some irregular bleeding and clotting outside of her cycle. Going on for three days now, going through a tampon every two hours. Same pattern for last three days but today is worse. Only changes was she was given generic birth control.      Pharm:  [facility].

## 2022-01-19 NOTE — TELEPHONE ENCOUNTER
Dr. Narayanan's patient called c/o irregular bleeding and passing clots outside of her menstrual cycle.   [phone] returned patient's call. Patient states she just finished her normal cycle about a week ago and she started a new pack of OCP's on Sunday. Patient states on Monday and yesterday the bleeding was normal she had to change a super tampon every 2 hours. This morning she had a super tampon in for only 10 minutes it was fully saturated when she went to change she said the bleeding was so heavy it was flowing like water and passing clots. I advised patient to go to the ER to be evaluated. Patient verbalized understanding.

## 2022-01-21 ENCOUNTER — TELEPHONE (OUTPATIENT)
Dept: OBSTETRICS AND GYNECOLOGY | Facility: CLINIC | Age: 52
End: 2022-01-21

## 2022-01-21 NOTE — TELEPHONE ENCOUNTER
Patient called stating she is bleeding again and scheduled appointment for 2/25/2022.  She is requesting appointment sooner than scheduled due to bleeding.  She states her bleeding is not as bad as Wednesday.  This morning she woke up at 6 with heavy bleeding but now she notices bleeding through 1/4 tampon every 30 minutes.  She is changing that often just to see how much bleeding she is having.

## 2022-01-21 NOTE — TELEPHONE ENCOUNTER
We can try to get her in with me early next week or with someone from the office.  Please review ER precautions as documented on January 19.    Thanks, Jen Narayanan MD

## 2022-01-21 NOTE — TELEPHONE ENCOUNTER
Patient advised of ED precaution and told we can move appointment up sooner.  Patient transferred to  for scheduling

## 2022-01-26 ENCOUNTER — LAB (OUTPATIENT)
Dept: LAB | Facility: HOSPITAL | Age: 52
End: 2022-01-26

## 2022-01-26 ENCOUNTER — OFFICE VISIT (OUTPATIENT)
Dept: OBSTETRICS AND GYNECOLOGY | Facility: CLINIC | Age: 52
End: 2022-01-26

## 2022-01-26 VITALS
SYSTOLIC BLOOD PRESSURE: 120 MMHG | BODY MASS INDEX: 25.17 KG/M2 | DIASTOLIC BLOOD PRESSURE: 86 MMHG | WEIGHT: 156.6 LBS | HEIGHT: 66 IN

## 2022-01-26 DIAGNOSIS — N93.9 ABNORMAL UTERINE BLEEDING (AUB): Primary | ICD-10-CM

## 2022-01-26 DIAGNOSIS — Z13.9 SPECIAL SCREENING: ICD-10-CM

## 2022-01-26 DIAGNOSIS — N93.9 ABNORMAL UTERINE BLEEDING (AUB): ICD-10-CM

## 2022-01-26 LAB
B-HCG UR QL: NEGATIVE
DEPRECATED RDW RBC AUTO: 38.9 FL (ref 37–54)
ERYTHROCYTE [DISTWIDTH] IN BLOOD BY AUTOMATED COUNT: 11.8 % (ref 12.3–15.4)
EXPIRATION DATE: NORMAL
HCG INTACT+B SERPL-ACNC: <0.1 MIU/ML
HCT VFR BLD AUTO: 40.4 % (ref 34–46.6)
HGB BLD-MCNC: 14 G/DL (ref 12–15.9)
INTERNAL NEGATIVE CONTROL: NEGATIVE
INTERNAL POSITIVE CONTROL: POSITIVE
Lab: NORMAL
MCH RBC QN AUTO: 31.3 PG (ref 26.6–33)
MCHC RBC AUTO-ENTMCNC: 34.7 G/DL (ref 31.5–35.7)
MCV RBC AUTO: 90.4 FL (ref 79–97)
PLATELET # BLD AUTO: 295 10*3/MM3 (ref 140–450)
PMV BLD AUTO: 11.2 FL (ref 6–12)
PROLACTIN SERPL-MCNC: 11.6 NG/ML (ref 4.79–23.3)
RBC # BLD AUTO: 4.47 10*6/MM3 (ref 3.77–5.28)
TSH SERPL DL<=0.05 MIU/L-ACNC: 1.31 UIU/ML (ref 0.27–4.2)
WBC NRBC COR # BLD: 8.17 10*3/MM3 (ref 3.4–10.8)

## 2022-01-26 PROCEDURE — 85027 COMPLETE CBC AUTOMATED: CPT

## 2022-01-26 PROCEDURE — 84146 ASSAY OF PROLACTIN: CPT

## 2022-01-26 PROCEDURE — 81025 URINE PREGNANCY TEST: CPT | Performed by: OBSTETRICS & GYNECOLOGY

## 2022-01-26 PROCEDURE — 99213 OFFICE O/P EST LOW 20 MIN: CPT | Performed by: OBSTETRICS & GYNECOLOGY

## 2022-01-26 PROCEDURE — 84443 ASSAY THYROID STIM HORMONE: CPT

## 2022-01-26 PROCEDURE — 84702 CHORIONIC GONADOTROPIN TEST: CPT

## 2022-01-26 NOTE — PROGRESS NOTES
"Subjective   Chief Complaint   Patient presents with   • Follow-up     irregular vaginal bleeding / happened last Wednesday and then started again on Friday, but has stopped     Francesca Manning is a 51 y.o. year old .  Patient's last menstrual period was 2022 (exact date).  She presents to be seen because of some issues with heavy irregular bleeding. She is currently taking a combined oral contraceptive pill. She finished her period 22. She did change pharmacy and they gave her a generic. That Wednesday she noticed some spotting then heavier bleeding for 2+ hours with heavy clots and through tampon and pads. She reports it did improve on its own. That following Friday morning at 3am she had heavier bleeding that felt like peeing blood. That tapered off by 9am. Last episode of heavy bleeding was about 5-7 days ago. She has not felt dizziness or light headedness. Currently she is in the third week of her pill pack. She is on an extended cycle pill (~ 3 months).     OTHER THINGS SHE WANTS TO DISCUSS TODAY:  Nothing else    The following portions of the patient's history were reviewed and updated as appropriate:current medications, allergies, past medical history and past surgical history    Social History    Tobacco Use      Smoking status: Never Smoker      Smokeless tobacco: Never Used    Review of Systems  Constitutional POS: nothing reported    NEG: anorexia or night sweats   Genitourinary POS: nothing reported    NEG: dysuria or hematuria   Gastointestinal POS: nothing reported    NEG: bloating, change in bowel habits, melena or reflux symptoms   Integument POS: nothing reported    NEG: moles that are changing in size, shape, color or rashes   Breast POS: nothing reported    NEG: persistent breast lump, skin dimpling or nipple discharge         Objective   /86 (BP Location: Left arm, Patient Position: Sitting, Cuff Size: Adult)   Ht 167.6 cm (66\")   Wt 71 kg (156 lb 9.6 oz)   LMP " 01/04/2022 (Exact Date)   Breastfeeding No   BMI 25.28 kg/m²     General:  well developed; well nourished  no acute distress   Skin:  Not performed.   Thyroid: normal to inspection and palpation   Lungs:  breathing is unlabored   Heart:  Not performed.   Breasts:  Not performed.   Abdomen: Not performed.   Pelvis: Clinical staff was present for exam  External genitalia:  normal appearance of the external genitalia including Bartholin's and [place]'s glands.  :  urethral meatus normal;  Vaginal:  normal pink mucosa without prolapse or lesions.  Cervix:  normal appearance. blood is seen coming from external cervical os;  Uterus:  normal size, shape and consistency.  Adnexa:  normal bimanual exam of the adnexa.  Rectal:  digital rectal exam not performed; anus visually normal appearing.     Lab Review   No data reviewed    Imaging   No data reviewed        Assessment   1. Abnormal uterine bleeding - suspect menopausal transition or breakthrough bleeding on combined oral contraceptive pill needs further work-up.     Plan   The following tests were ordered today: TSH, CBC, HCG, Prolactin, UPT.  It was explained to Francesca that all lab test should be back within the one week after they are performed. She will be notified about the results, regardless of the findings. If she has not been contacted by the office within 2 weeks after the test has been performed, it is her responsibility to contact us to learn about her results.  Pap and HPV were done today.  If she does not receive the results of the Pap within 2 weeks  time, she was instructed to call to find out the results.  I explained to Francesca that the recommendations for Pap smear interval in a low risk patient's has lengthened to 5 years time if both cytology and HPV testing were normal.  I encouraged her to be seen yearly for a full physical exam including breast and pelvic exam even during the off years when PAP's will not be performed.  Ultrasound needs to be done  ASAP.   The importance of keeping all planned follow-up and taking all medications as prescribed was emphasized.  Follow up for recheck of above when ultrasound scheduled    No orders of the defined types were placed in this encounter.         This note was electronically signed.    Jen Narayanan MD  January 26, 2022

## 2022-02-09 ENCOUNTER — OFFICE VISIT (OUTPATIENT)
Dept: OBSTETRICS AND GYNECOLOGY | Facility: CLINIC | Age: 52
End: 2022-02-09

## 2022-02-09 VITALS — SYSTOLIC BLOOD PRESSURE: 132 MMHG | WEIGHT: 157 LBS | BODY MASS INDEX: 25.34 KG/M2 | DIASTOLIC BLOOD PRESSURE: 72 MMHG

## 2022-02-09 DIAGNOSIS — N84.0 ENDOMETRIAL POLYP: ICD-10-CM

## 2022-02-09 DIAGNOSIS — N93.9 ABNORMAL UTERINE BLEEDING (AUB): Primary | ICD-10-CM

## 2022-02-09 PROCEDURE — 99213 OFFICE O/P EST LOW 20 MIN: CPT | Performed by: OBSTETRICS & GYNECOLOGY

## 2022-02-09 NOTE — PROGRESS NOTES
Subjective   Chief Complaint   Patient presents with   • Gynecologic Exam     F/U for U/S     Francesca Manning is a 51 y.o. year old  who comes to review her recent testing and discuss next steps.    OTHER THINGS SHE WANTS TO DISCUSS TODAY:  Nothing else       Objective   /72 (BP Location: Left arm, Patient Position: Sitting, Cuff Size: Adult)   Wt 71.2 kg (157 lb)   LMP 2022 (Approximate)   Breastfeeding No   BMI 25.34 kg/m²     Lab Review   No data reviewed    Imaging   Pelvic ultrasound images independantly reviewed - findings consistent with endometrial polyp       Assessment   1. AUB - endometrial polyp     Plan   1. Reviewed ultrasound findings today and concern for possible endometrial polyp.  Reviewed options would be a saline infusion sonogram to confirm versus going directly to hysteroscopy with D&C and polypectomy with myosure if polyp seen.  Patient desires to proceed with hysteroscopy.   2. Schedule surgery - hysteroscopy, Dilation and curettage, polypectomy with myosure.   3. Today I discussed with Francesca the risks of her upcoming hysteroscopy with possible Myosure resection of intracavitary pathology. Risks reviewed included intraoperative bleeding, infection at the site of surgery and damage to the adjacent surrounding organs. Additionally, the small risk for reoperation in the event of unanticipated bleeding or surgical injury was discussed.  Finally we discussed the risks of cerebral and/or pulmonary edema related to excess absorption of the distending fluid and the small chance the procedure could not be completed if there was an excessive mismatch of fluid infused & fluid recovered.  All of her questions were answered fully.  She left with a very clear understanding of the preoperative surgical indications and the nature of the surgery for which she is scheduled.  She understands to be NPO after midnight and to be at the preoperative area ~ 1 hour prior to the scheduled  surgical start time.  4. The importance of keeping all planned follow-up and taking all medications as prescribed was emphasized.    No orders of the defined types were placed in this encounter.         Total time spent today with Francesca  was 20-29 minutes (level 3).  Greater than 50% of the time was spent face-to-face coordinating care, answering her questions and counseling regarding pathophysiology of her presenting problem along with plans for any diagnositc work-up and treatment.    This note was electronically signed.    Jen Narayanan MD  February 9, 2022

## 2022-02-10 DIAGNOSIS — Z01.818 PRE-OP TESTING: Primary | ICD-10-CM

## 2022-03-01 ENCOUNTER — LAB (OUTPATIENT)
Dept: PREADMISSION TESTING | Facility: HOSPITAL | Age: 52
End: 2022-03-01

## 2022-03-01 LAB — SARS-COV-2 RNA PNL SPEC NAA+PROBE: NOT DETECTED

## 2022-03-01 PROCEDURE — U0004 COV-19 TEST NON-CDC HGH THRU: HCPCS | Performed by: OBSTETRICS & GYNECOLOGY

## 2022-03-01 PROCEDURE — C9803 HOPD COVID-19 SPEC COLLECT: HCPCS | Performed by: OBSTETRICS & GYNECOLOGY

## 2022-03-03 ENCOUNTER — PREP FOR SURGERY (OUTPATIENT)
Dept: OTHER | Facility: HOSPITAL | Age: 52
End: 2022-03-03

## 2022-03-04 ENCOUNTER — OUTSIDE FACILITY SERVICE (OUTPATIENT)
Dept: OBSTETRICS AND GYNECOLOGY | Facility: CLINIC | Age: 52
End: 2022-03-04

## 2022-03-04 ENCOUNTER — LAB REQUISITION (OUTPATIENT)
Dept: LAB | Facility: HOSPITAL | Age: 52
End: 2022-03-04

## 2022-03-04 DIAGNOSIS — N93.9 ABNORMAL UTERINE AND VAGINAL BLEEDING, UNSPECIFIED: ICD-10-CM

## 2022-03-04 PROBLEM — Z98.890 POST-OPERATIVE STATE: Status: ACTIVE | Noted: 2022-03-04

## 2022-03-04 PROCEDURE — 58558 HYSTEROSCOPY BIOPSY: CPT | Performed by: OBSTETRICS & GYNECOLOGY

## 2022-03-04 PROCEDURE — 88305 TISSUE EXAM BY PATHOLOGIST: CPT | Performed by: OBSTETRICS & GYNECOLOGY

## 2022-03-04 RX ORDER — DOCUSATE SODIUM 100 MG/1
100 CAPSULE, LIQUID FILLED ORAL 2 TIMES DAILY PRN
Qty: 60 CAPSULE | Refills: 1 | Status: SHIPPED | OUTPATIENT
Start: 2022-03-04 | End: 2022-11-29

## 2022-03-04 RX ORDER — IBUPROFEN 600 MG/1
600 TABLET ORAL EVERY 6 HOURS PRN
Qty: 60 TABLET | Refills: 1 | Status: SHIPPED | OUTPATIENT
Start: 2022-03-04 | End: 2022-11-29

## 2022-03-04 NOTE — H&P
Subjective   CC: pre op, AUB  Francesca Manning is a 51 y.o. year old  who is scheduled  for surgery due to AUB and endometrial polyp.    Past Medical History:   Diagnosis Date   • Hypercholesteremia    • Migraines      Past Surgical History:   Procedure Laterality Date   •  SECTION  2010    Boy: Hitesh   •  SECTION PRIMARY  2002    Boy: [place]   • CYSTOSCOPY      X 2   • EYE SURGERY       OB History    Para Term  AB Living   3 2 2 0 1 2   SAB IAB Ectopic Molar Multiple Live Births   1 0 0 0 0 0      # Outcome Date GA Lbr Jun/2nd Weight Sex Delivery Anes PTL Lv   3 SAB            2 Term            1 Term               Obstetric Comments   2 c/s and 1 SAB   Denies history of STIs   Denies history of abnormal paps     Social History     Socioeconomic History   • Marital status:    Tobacco Use   • Smoking status: Never Smoker   • Smokeless tobacco: Never Used   Substance and Sexual Activity   • Alcohol use: No   • Drug use: No   • Sexual activity: Yes     Partners: Male     Birth control/protection: OCP     Prior to Admission medications    Medication Sig Start Date End Date Taking? Authorizing Provider   buPROPion XL (WELLBUTRIN XL) 150 MG 24 hr tablet TK 1 T PO QD 17   Jh Ghotra MD   Cholecalciferol (VITAMIN D3) 2000 UNITS tablet TK 1 T PO QD 10/5/16   Jh Ghotra MD   Cyanocobalamin (B-12 PO) Take  by mouth.    Jh Ghotra MD   famotidine (PEPCID) 40 MG tablet  21   Jh Ghotra MD   ferrous sulfate (IRON SUPPLEMENT) 325 (65 FE) MG tablet Take 325 mg by mouth Daily With Breakfast.    Jh Ghotra MD   Icosapent Ethyl (VASCEPA PO) Take  by mouth.    Jh Ghotra MD   levonorgestrel-ethinyl estradiol (Jolessa) 0.15-0.03 MG per tablet Take 1 tablet by mouth Daily. 3/9/21   Jen Narayanan MD   spironolactone (ALDACTONE) 25 MG tablet TK 1 T PO  BID 17   Jh Ghotra MD   SUMAtriptan  (IMITREX) 50 MG tablet Take  by mouth. 10/13/18   Provider, Jh, MD       Allergies   Allergen Reactions   • Sulfa Antibiotics Rash   • Sulfamethoxazole Rash   • Trimethoprim Unknown - Low Severity     Social History    Tobacco Use      Smoking status: Never Smoker      Smokeless tobacco: Never Used    Review of Systems   Constitutional: Negative for chills and fever.   HENT: Negative for congestion and sore throat.    Respiratory: Negative for shortness of breath and wheezing.    Cardiovascular: Negative for chest pain and palpitations.   Gastrointestinal: Negative for abdominal pain, nausea and vomiting.   Genitourinary: Negative for frequency, vaginal bleeding and vaginal pain.   Musculoskeletal: Negative for back pain and myalgias.   Neurological: Negative for dizziness, light-headedness and headaches.   Psychiatric/Behavioral: Negative for confusion. The patient is not nervous/anxious.          Objective   There were no vitals taken for this visit.  General: well developed; well nourished  no acute distress  alert and oriented x3   Heart: Not performed.   Lungs: breathing is unlabored   Abdomen: soft, non-tender; no masses  no umbilical or inguinal hernias are present  no hepato-splenomegaly   Pelvis:: Not performed.   Indication  ========     irregular menses, heavy bleeding, on COCPs  Dx: Abnormal uterine bleeding (AUB) [N93.9 (ICD-10-CM)]        Comparison Studies  =================     There are no relevant prior studies to which this study is being compared        History  ======     Previous Outcomes   3  Para      3  Stillbirths            1        Method  =======     [place] E6, Transvaginal ultrasound examination, Color Doppler flow performed, 3D ultrasound examination. View: Adequate view        Uterus  ======     Uterus position: Anteverted  Description of uterine malformations:       none  Myometrium:      Homogeneous  Endometrium:    probable polyp measuring 10.7 x 6.8 x 4.2  mm  Cervix details:    Normal  Uterus long        49 mm  Uterus ap           43 mm  Uterus tr             52 mm  Uterus Vol          56.8 cm³  Endometrial thickness, total        12.6 mm  Cervical length   29.2 mm        Right Ovary  ==========     Rt ovary:            Normal  Rt ovary D1       22.1 mm  Rt ovary D2       14.7 mm  Rt ovary D3       10.3 mm  Rt ovary Vol       1.8 cm³        Left Ovary  =========     Lt ovary:             Not visualized        Cul de Sac  =========     Normal. No free fluid visualized        Impression  =========     Findings consistent with an endometrial polyp are present       Assessment   1. AUB - endometrial polyp     Plan   1. Reviewed ultrasound findings today and concern for possible endometrial polyp.  Reviewed options would be a saline infusion sonogram to confirm versus going directly to hysteroscopy with D&C and polypectomy with myosure if polyp seen.  Patient desires to proceed with hysteroscopy.   2. Schedule surgery - hysteroscopy, Dilation and curettage, polypectomy with myosure.   3. Today I discussed with Francesca the risks of her upcoming hysteroscopy with possible Myosure resection of intracavitary pathology. Risks reviewed included intraoperative bleeding, infection at the site of surgery and damage to the adjacent surrounding organs. Additionally, the small risk for reoperation in the event of unanticipated bleeding or surgical injury was discussed.  Finally we discussed the risks of cerebral and/or pulmonary edema related to excess absorption of the distending fluid and the small chance the procedure could not be completed if there was an excessive mismatch of fluid infused & fluid recovered.  All of her questions were answered fully.  She left with a very clear understanding of the preoperative surgical indications and the nature of the surgery for which she is scheduled.  She understands to be NPO after midnight and to be at the preoperative area ~ 1 hour prior to  the scheduled surgical start time.  4. The importance of keeping all planned follow-up and taking all medications as prescribed was emphasized.     No orders of the defined types were placed in this encounter.              Total time spent today with Francesca  was 20-29 minutes (level 3).  Greater than 50% of the time was spent face-to-face coordinating care, answering her questions and counseling regarding pathophysiology of her presenting problem along with plans for any diagnositc work-up and treatment.     This note was electronically signed.     Jen Narayanan MD  February 9, 2022          Jen Narayanan MD  3/3/2022       (Pt's PCP is Dominique Blunt PA-C)

## 2022-03-08 ENCOUNTER — APPOINTMENT (OUTPATIENT)
Dept: PREADMISSION TESTING | Facility: HOSPITAL | Age: 52
End: 2022-03-08

## 2022-03-08 LAB
CYTO UR: NORMAL
LAB AP CASE REPORT: NORMAL
LAB AP CLINICAL INFORMATION: NORMAL
PATH REPORT.FINAL DX SPEC: NORMAL
PATH REPORT.GROSS SPEC: NORMAL

## 2022-03-23 ENCOUNTER — OFFICE VISIT (OUTPATIENT)
Dept: OBSTETRICS AND GYNECOLOGY | Facility: CLINIC | Age: 52
End: 2022-03-23

## 2022-03-23 VITALS
DIASTOLIC BLOOD PRESSURE: 82 MMHG | HEIGHT: 66 IN | WEIGHT: 158 LBS | SYSTOLIC BLOOD PRESSURE: 124 MMHG | BODY MASS INDEX: 25.39 KG/M2

## 2022-03-23 DIAGNOSIS — Z98.890 POST-OPERATIVE STATE: Primary | ICD-10-CM

## 2022-03-23 PROCEDURE — 99024 POSTOP FOLLOW-UP VISIT: CPT | Performed by: OBSTETRICS & GYNECOLOGY

## 2022-03-23 NOTE — PROGRESS NOTES
"Subjective   Chief Complaint   Patient presents with   • Post-op     Hyst d&c polypectomy myosure done on 3/11/22. No c/c after surgery     Francesca Manning is a 51 y.o. year old  presenting to be seen for her post-operative visit.  Currently she reports no problems with eating, bowel movements, voiding, or wound drainage and pain is well controlled.    The pathology results from her procedure are in Francesca's record and are benign.      OTHER THINGS SHE WANTS TO DISCUSS TODAY:  Nothing else    The following portions of the patient's history were reviewed and updated as appropriate:current medications and allergies       Objective   /82 (BP Location: Left arm, Patient Position: Sitting, Cuff Size: Adult)   Ht 167.6 cm (66\")   Wt 71.7 kg (158 lb)   LMP 2022   Breastfeeding No   BMI 25.50 kg/m²     General:  well developed; well nourished  no acute distress   Abdomen: soft, non-tender; no masses  no umbilical or inguinal hernias are present  no hepato-splenomegaly   Pelvis: Clinical staff was present for exam  External genitalia:  normal appearance of the external genitalia including [place]'s and [place]'s glands.  :  urethral meatus normal;  Vaginal:  normal pink mucosa without prolapse or lesions.  Cervix:  normal appearance.  Uterus:  normal size, shape and consistency.  Adnexa:  normal bimanual exam of the adnexa.  Rectal:  digital rectal exam not performed; anus visually normal appearing.          Assessment   1. S/P hysteroscopy with Myosure     Plan   1. May return to full activity with no restrictions  She was encouraged to get yearly mammograms.  She should report any palpable breast lump(s) or skin changes regardless of mammographic findings.  I explained to Francesca that notification regarding her mammogram results will come from the center performing the study.  Our office will not be routinely calling with mammogram results.  It is her responsibility to make sure that the results from " the mammogram are communicated to her by the breast center.  If she has any questions about the results, she is welcome to call our office anytime.  Reviewed over the next 6 months to a year she needs to consider coming off combined oral contraceptive pills.  Reviewed other options in order to take FSH would need to be off of it for at least 4 to 6 weeks.  She verbalized understanding.  The importance of keeping all planned follow-up and taking all medications as prescribed was emphasized.  Follow up for annual exam in ~ one year.    No orders of the defined types were placed in this encounter.         This note was electronically signed.    Jen Narayanan MD  March 23, 2022

## 2022-03-31 RX ORDER — LEVONORGESTREL AND ETHINYL ESTRADIOL 0.15-0.03
KIT ORAL
Qty: 91 TABLET | Refills: 0 | Status: SHIPPED | OUTPATIENT
Start: 2022-03-31 | End: 2022-06-30

## 2022-04-02 RX ORDER — ICOSAPENT ETHYL 1000 MG/1
CAPSULE ORAL
Qty: 120 CAPSULE | Refills: 0 | Status: SHIPPED | OUTPATIENT
Start: 2022-04-02 | End: 2022-05-02

## 2022-04-15 ENCOUNTER — TRANSCRIBE ORDERS (OUTPATIENT)
Dept: ADMINISTRATIVE | Facility: HOSPITAL | Age: 52
End: 2022-04-15

## 2022-04-15 DIAGNOSIS — Z12.31 VISIT FOR SCREENING MAMMOGRAM: Primary | ICD-10-CM

## 2022-05-02 RX ORDER — ICOSAPENT ETHYL 1000 MG/1
CAPSULE ORAL
Qty: 120 CAPSULE | Refills: 0 | Status: SHIPPED | OUTPATIENT
Start: 2022-05-02 | End: 2022-06-07

## 2022-05-05 ENCOUNTER — HOSPITAL ENCOUNTER (OUTPATIENT)
Dept: MAMMOGRAPHY | Facility: HOSPITAL | Age: 52
Discharge: HOME | End: 2022-05-05
Admitting: OBSTETRICS & GYNECOLOGY

## 2022-05-05 DIAGNOSIS — Z12.31 VISIT FOR SCREENING MAMMOGRAM: ICD-10-CM

## 2022-05-05 PROCEDURE — 77063 BREAST TOMOSYNTHESIS BI: CPT | Performed by: RADIOLOGY

## 2022-05-05 PROCEDURE — 77067 SCR MAMMO BI INCL CAD: CPT | Performed by: RADIOLOGY

## 2022-05-05 PROCEDURE — 77067 SCR MAMMO BI INCL CAD: CPT

## 2022-05-05 PROCEDURE — 77063 BREAST TOMOSYNTHESIS BI: CPT

## 2022-05-13 DIAGNOSIS — Z91.89 AT HIGH RISK FOR BREAST CANCER: Primary | ICD-10-CM

## 2022-06-07 RX ORDER — ICOSAPENT ETHYL 1000 MG/1
CAPSULE ORAL
Qty: 120 CAPSULE | Refills: 0 | Status: SHIPPED | OUTPATIENT
Start: 2022-06-07 | End: 2022-07-08

## 2022-06-09 ENCOUNTER — PATIENT MESSAGE (OUTPATIENT)
Dept: OBSTETRICS AND GYNECOLOGY | Facility: CLINIC | Age: 52
End: 2022-06-09

## 2022-06-09 NOTE — TELEPHONE ENCOUNTER
From: Francesca Manning  To: Jen Narayanan MD  Sent: 6/9/2022 9:21 AM EDT  Subject: additional mammogram    I am supposed to get another mammogram. I can't remember if I'm supposed to schedule it or if someone is going to schedule and give me the date and time. Please let me know.

## 2022-06-09 NOTE — TELEPHONE ENCOUNTER
Dr. Narayanan's patient  [phone] spoke with patient and advised her she should have received a call from central scheduling to get her scheduled for the breast MRI. I gave patient the number to central scheduling [phone]. Patient verbalized understanding.

## 2022-06-16 DIAGNOSIS — F32.A MILD DEPRESSION: Primary | ICD-10-CM

## 2022-06-16 RX ORDER — BUPROPION HYDROCHLORIDE 300 MG/1
300 TABLET ORAL DAILY
Qty: 90 TABLET | Refills: 0 | Status: SHIPPED | OUTPATIENT
Start: 2022-06-16 | End: 2022-09-12

## 2022-06-30 RX ORDER — LEVONORGESTREL AND ETHINYL ESTRADIOL 0.15-0.03
KIT ORAL
Qty: 91 TABLET | Refills: 0 | Status: SHIPPED | OUTPATIENT
Start: 2022-06-30 | End: 2022-09-29

## 2022-07-08 RX ORDER — ICOSAPENT ETHYL 1000 MG/1
CAPSULE ORAL
Qty: 120 CAPSULE | Refills: 0 | Status: SHIPPED | OUTPATIENT
Start: 2022-07-08 | End: 2022-08-15

## 2022-07-19 ENCOUNTER — OFFICE VISIT (OUTPATIENT)
Dept: FAMILY MEDICINE CLINIC | Facility: CLINIC | Age: 52
End: 2022-07-19

## 2022-07-19 VITALS
BODY MASS INDEX: 24.75 KG/M2 | DIASTOLIC BLOOD PRESSURE: 90 MMHG | HEIGHT: 66 IN | WEIGHT: 154 LBS | SYSTOLIC BLOOD PRESSURE: 130 MMHG

## 2022-07-19 DIAGNOSIS — R30.0 DYSURIA: Primary | ICD-10-CM

## 2022-07-19 DIAGNOSIS — N30.01 ACUTE CYSTITIS WITH HEMATURIA: ICD-10-CM

## 2022-07-19 LAB
BILIRUB BLD-MCNC: NEGATIVE MG/DL
CLARITY, POC: CLEAR
COLOR UR: YELLOW
EXPIRATION DATE: ABNORMAL
GLUCOSE UR STRIP-MCNC: NEGATIVE MG/DL
KETONES UR QL: NEGATIVE
LEUKOCYTE EST, POC: ABNORMAL
Lab: ABNORMAL
NITRITE UR-MCNC: NEGATIVE MG/ML
PH UR: 7 [PH] (ref 5–8)
PROT UR STRIP-MCNC: NEGATIVE MG/DL
RBC # UR STRIP: ABNORMAL /UL
SP GR UR: 1.01 (ref 1–1.03)
UROBILINOGEN UR QL: NORMAL

## 2022-07-19 PROCEDURE — 99213 OFFICE O/P EST LOW 20 MIN: CPT | Performed by: PHYSICIAN ASSISTANT

## 2022-07-19 PROCEDURE — 81003 URINALYSIS AUTO W/O SCOPE: CPT | Performed by: PHYSICIAN ASSISTANT

## 2022-07-19 RX ORDER — NITROFURANTOIN 25; 75 MG/1; MG/1
100 CAPSULE ORAL 2 TIMES DAILY
Qty: 14 CAPSULE | Refills: 0 | Status: SHIPPED | OUTPATIENT
Start: 2022-07-19 | End: 2022-11-29

## 2022-07-19 NOTE — PROGRESS NOTES
"Chief Complaint  Urinary Tract Infection (States that she has burning and discomfort for about 1 week)    Subjective        Francesca Manning presents to [facility]  Patient reports today secondary to having signs of urinary tract infection for the past week.  Patient reports having burning with urination and some abdominal discomfort.  Patient denies any fever or chills, denies any nausea or vomiting, denies any back pain.    Urinary Tract Infection   Associated symptoms include frequency. Pertinent negatives include no chills, discharge, flank pain, hematuria, hesitancy, nausea, possible pregnancy, sweats, urgency or vomiting.   Dysuria   This is a new problem. The current episode started 1 to 4 weeks ago. The problem occurs every urination. The problem has been rapidly worsening. The quality of the pain is described as burning. The pain is at a severity of 4/10. There has been no fever. She is sexually active. There is a history of pyelonephritis. Associated symptoms include frequency. Pertinent negatives include no chills, discharge, flank pain, hematuria, hesitancy, nausea, possible pregnancy, sweats, urgency or vomiting.       Objective   Vital Signs:  /90 (BP Location: Right arm, Patient Position: Sitting, Cuff Size: Adult)   Ht 167.6 cm (66\")   Wt 69.9 kg (154 lb)   BMI 24.86 kg/m²   Estimated body mass index is 24.86 kg/m² as calculated from the following:    Height as of this encounter: 167.6 cm (66\").    Weight as of this encounter: 69.9 kg (154 lb).    BMI is within normal parameters. No other follow-up for BMI required.      Physical Exam  Vitals and nursing note reviewed.   Constitutional:       General: She is not in acute distress.     Appearance: She is not ill-appearing.   HENT:      Mouth/Throat:      Pharynx: No oropharyngeal exudate.   Eyes:      Pupils: Pupils are equal, round, and reactive to light.   Cardiovascular:      Rate and Rhythm: Normal rate and " regular rhythm.   Pulmonary:      Effort: Pulmonary effort is normal. No respiratory distress.   Abdominal:      Tenderness: There is no right CVA tenderness or left CVA tenderness.   Musculoskeletal:         General: Normal range of motion.   Skin:     General: Skin is warm and dry.   Psychiatric:         Mood and Affect: Mood normal.        Result Review :                Assessment and Plan   Diagnoses and all orders for this visit:    1. Dysuria (Primary)  -     POCT urinalysis dipstick, automated    2. Acute cystitis with hematuria  Assessment & Plan:  Patient prescribed Macrobid and advised her to increase fluids as tolerated.  Also discussed with patient importance of follow-up secondary to having hematuria today.  Patient reports she return to clinic in the next 7 to 10 days for sample.  Call if any problems arise    Orders:  -     Urine Culture - Urine, Urine, Clean Catch; Future  -     nitrofurantoin, macrocrystal-monohydrate, (Macrobid) 100 MG capsule; Take 1 capsule by mouth 2 (Two) Times a Day.  Dispense: 14 capsule; Refill: 0           Follow Up   No follow-ups on file.  Patient was given instructions and counseling regarding her condition or for health maintenance advice. Please see specific information pulled into the AVS if appropriate.

## 2022-07-19 NOTE — ASSESSMENT & PLAN NOTE
Patient prescribed Macrobid and advised her to increase fluids as tolerated.  Also discussed with patient importance of follow-up secondary to having hematuria today.  Patient reports she return to clinic in the next 7 to 10 days for sample.  Call if any problems arise

## 2022-07-26 ENCOUNTER — OFFICE VISIT (OUTPATIENT)
Dept: FAMILY MEDICINE CLINIC | Facility: CLINIC | Age: 52
End: 2022-07-26

## 2022-07-26 VITALS
WEIGHT: 154 LBS | DIASTOLIC BLOOD PRESSURE: 82 MMHG | OXYGEN SATURATION: 98 % | HEIGHT: 66 IN | BODY MASS INDEX: 24.75 KG/M2 | SYSTOLIC BLOOD PRESSURE: 124 MMHG | HEART RATE: 84 BPM

## 2022-07-26 DIAGNOSIS — R31.9 HEMATURIA, UNSPECIFIED TYPE: ICD-10-CM

## 2022-07-26 DIAGNOSIS — M50.30 BULGING OF CERVICAL INTERVERTEBRAL DISC: Primary | ICD-10-CM

## 2022-07-26 LAB
BILIRUB BLD-MCNC: NEGATIVE MG/DL
CLARITY, POC: CLEAR
COLOR UR: YELLOW
EXPIRATION DATE: ABNORMAL
GLUCOSE UR STRIP-MCNC: NEGATIVE MG/DL
KETONES UR QL: NEGATIVE
LEUKOCYTE EST, POC: ABNORMAL
Lab: ABNORMAL
NITRITE UR-MCNC: NEGATIVE MG/ML
PH UR: 7.5 [PH] (ref 5–8)
PROT UR STRIP-MCNC: NEGATIVE MG/DL
RBC # UR STRIP: ABNORMAL /UL
SP GR UR: 1.01 (ref 1–1.03)
UROBILINOGEN UR QL: NORMAL

## 2022-07-26 PROCEDURE — 99213 OFFICE O/P EST LOW 20 MIN: CPT | Performed by: PHYSICIAN ASSISTANT

## 2022-07-26 PROCEDURE — 81003 URINALYSIS AUTO W/O SCOPE: CPT | Performed by: PHYSICIAN ASSISTANT

## 2022-07-26 NOTE — ASSESSMENT & PLAN NOTE
Since patient reports she has had physical therapy this year provided patient with order for referral to obtain MRI.  Patient also requesting referral to orthopedics and it has been placed in chart as well

## 2022-07-26 NOTE — PROGRESS NOTES
"Chief Complaint  Neck Pain (Patient said she has been having problems with her neck for over a year now.)    Subjective        Francesca Manning presents to [facility]  Patient reports today secondary to having neck pain over the past 1 to 2 years.  Patient reports she has had ongoing.  Physical therapy however no relief.  Patient reports it is really starting to affect her daily life.  States she called for referral to orthopedics however her MRI is outdated requesting an updated scan.  .    Neck Pain         Objective   Vital Signs:  /82 (BP Location: Right arm, Patient Position: Sitting, Cuff Size: Adult)   Pulse 84   Ht 167.6 cm (66\")   Wt 69.9 kg (154 lb)   SpO2 98%   BMI 24.86 kg/m²   Estimated body mass index is 24.86 kg/m² as calculated from the following:    Height as of this encounter: 167.6 cm (66\").    Weight as of this encounter: 69.9 kg (154 lb).    BMI is within normal parameters. No other follow-up for BMI required.      Physical Exam  Vitals and nursing note reviewed.   Constitutional:       General: She is not in acute distress.     Appearance: Normal appearance.   HENT:      Head: Normocephalic.      Right Ear: Hearing normal.      Left Ear: Hearing normal.   Eyes:      Pupils: Pupils are equal, round, and reactive to light.   Cardiovascular:      Rate and Rhythm: Normal rate and regular rhythm.   Pulmonary:      Effort: Pulmonary effort is normal. No respiratory distress.   Skin:     General: Skin is warm and dry.   Neurological:      Mental Status: She is alert.   Psychiatric:         Mood and Affect: Mood normal.        Result Review :                Assessment and Plan   Diagnoses and all orders for this visit:    1. Bulging of cervical intervertebral disc (Primary)  Assessment & Plan:  Since patient reports she has had physical therapy this year provided patient with order for referral to obtain MRI.  Patient also requesting referral to orthopedics " and it has been placed in chart as well    Orders:  -     MRI Cervical Spine Without Contrast; Future  -     Ambulatory Referral to Orthopedic Surgery    2. Hematuria, unspecified type  -     POCT urinalysis dipstick, automated           Follow Up   No follow-ups on file.  Patient was given instructions and counseling regarding her condition or for health maintenance advice. Please see specific information pulled into the AVS if appropriate.

## 2022-08-15 RX ORDER — ICOSAPENT ETHYL 1000 MG/1
2 CAPSULE ORAL 2 TIMES DAILY WITH MEALS
Qty: 120 CAPSULE | Refills: 0 | Status: SHIPPED | OUTPATIENT
Start: 2022-08-15 | End: 2022-11-29 | Stop reason: SDUPTHER

## 2022-09-07 ENCOUNTER — TELEPHONE (OUTPATIENT)
Dept: FAMILY MEDICINE CLINIC | Facility: CLINIC | Age: 52
End: 2022-09-07

## 2022-09-07 DIAGNOSIS — M50.30 BULGING OF CERVICAL INTERVERTEBRAL DISC: Primary | ICD-10-CM

## 2022-09-07 RX ORDER — FAMOTIDINE 40 MG/1
TABLET, FILM COATED ORAL
Status: CANCELLED | OUTPATIENT
Start: 2022-09-07

## 2022-09-09 ENCOUNTER — TELEPHONE (OUTPATIENT)
Dept: FAMILY MEDICINE CLINIC | Facility: CLINIC | Age: 52
End: 2022-09-09

## 2022-09-09 NOTE — TELEPHONE ENCOUNTER
PATIENT CHECKING STATUS ON HER MESSAGES, REGARDING MEDICATION REFILL,  REFERRAL FOR ORTHOPAEDICS,  PLEASE CONTACT REGARDING THIS

## 2022-09-12 DIAGNOSIS — F32.A MILD DEPRESSION: ICD-10-CM

## 2022-09-12 RX ORDER — BUPROPION HYDROCHLORIDE 300 MG/1
TABLET ORAL
Qty: 30 TABLET | Refills: 0 | Status: SHIPPED | OUTPATIENT
Start: 2022-09-12 | End: 2022-10-12

## 2022-09-13 NOTE — TELEPHONE ENCOUNTER
Caller: Francesca Manning    Relationship: Self    Best call back number: [phone]    What is the best time to reach you: ANYTIME    Who are you requesting to speak with (clinical staff, provider,  specific staff member): REFERRAL COORDINATOT    What was the call regarding: FOLLOWING UP ON REFERRAL TO DR. CUNHA    Do you require a callback: YES

## 2022-09-13 NOTE — TELEPHONE ENCOUNTER
Caller: Francesca Manning    Relationship: Self    Best call back number: [phone]    What medication are you requesting: FAMOTIDINE 10 MG TABLET    If a prescription is needed, what is your preferred pharmacy and phone number: [facility] - [place], [place] - [address] - [phone] [facility] [phone] FX     Additional notes: PATIENT IS REQUESTING A REFILL FOR FAMOTIDINE 10 MG TABLETS

## 2022-09-15 ENCOUNTER — TELEPHONE (OUTPATIENT)
Dept: FAMILY MEDICINE CLINIC | Facility: CLINIC | Age: 52
End: 2022-09-15

## 2022-09-15 NOTE — TELEPHONE ENCOUNTER
Caller: Francesca Manning    Relationship: Self    Best call back number:     [phone]    Which medication are you concerned about:     famotidine (PEPCID) 40 MG tablet    Who prescribed you this medication:     FLORESITA MAHARAJ    What are your concerns:     PATIENT STATED, WITH FLORESITA MAHARAJ'S APPROVAL, SHE HAD WEANED HERSELF ON THE MEDICATION LISTED ABOVE TO (10) MG PRIOR TO STOPPING THE MEDICATION    PATIENT STATED RECENTLY SYM[PTOMS HAVE FLARED, AND SHE WOULD LIKE TO HAVE THE PRESCRIPTION FILLED AGAIN    PATIENT REQUESTED FLORESITA MAHARAJ TO PRESCRIBE THE MEDICATION FOR (10) MG    PATIENT ALSO REQUESTED A CALL BACK IF SHE WOULD NEED TO BE SEEN BY FLORESITA PRIOR TO PRESCRIBING THE MEDICATION AGAIN

## 2022-09-16 RX ORDER — FAMOTIDINE 20 MG/1
20 TABLET, FILM COATED ORAL NIGHTLY PRN
Qty: 90 TABLET | Refills: 1 | Status: SHIPPED | OUTPATIENT
Start: 2022-09-16 | End: 2022-09-23

## 2022-09-23 ENCOUNTER — TELEPHONE (OUTPATIENT)
Dept: FAMILY MEDICINE CLINIC | Facility: CLINIC | Age: 52
End: 2022-09-23

## 2022-09-23 RX ORDER — FAMOTIDINE 10 MG
10 TABLET ORAL 2 TIMES DAILY
Qty: 180 TABLET | Refills: 1 | Status: SHIPPED | OUTPATIENT
Start: 2022-09-23 | End: 2022-11-29 | Stop reason: SDUPTHER

## 2022-09-26 ENCOUNTER — TELEPHONE (OUTPATIENT)
Dept: FAMILY MEDICINE CLINIC | Facility: CLINIC | Age: 52
End: 2022-09-26

## 2022-09-28 NOTE — TELEPHONE ENCOUNTER
Called attempting to reach patient; no answer, LVM requesting call back and provided office call back number.

## 2022-09-29 RX ORDER — LEVONORGESTREL AND ETHINYL ESTRADIOL 0.15-0.03
KIT ORAL
Qty: 91 TABLET | Refills: 0 | Status: SHIPPED | OUTPATIENT
Start: 2022-09-29 | End: 2022-12-29

## 2022-09-30 NOTE — TELEPHONE ENCOUNTER
New Medications Ordered This Visit   Medications   • levonorgestrel-ethinyl estradiol (SEASONALE) 0.15-0.03 MG per tablet     Sig: TAKE ONE TABLET BY MOUTH EVERY DAY     Dispense:  91 tablet     Refill:  0     Refills sent. However, I would encourage as we discussed at last visit considering stopping these soon given risks increase after age 50.    Thanks, Jen Narayanan MD

## 2022-10-10 ENCOUNTER — APPOINTMENT (OUTPATIENT)
Dept: MRI IMAGING | Facility: HOSPITAL | Age: 52
End: 2022-10-10

## 2022-10-10 DIAGNOSIS — F32.A MILD DEPRESSION: ICD-10-CM

## 2022-10-12 RX ORDER — BUPROPION HYDROCHLORIDE 300 MG/1
TABLET ORAL
Qty: 30 TABLET | Refills: 0 | Status: SHIPPED | OUTPATIENT
Start: 2022-10-12 | End: 2022-11-19 | Stop reason: SDUPTHER

## 2022-10-13 ENCOUNTER — HOSPITAL ENCOUNTER (OUTPATIENT)
Dept: MRI IMAGING | Facility: HOSPITAL | Age: 52
Discharge: HOME | End: 2022-10-13
Admitting: OBSTETRICS & GYNECOLOGY

## 2022-10-13 DIAGNOSIS — Z91.89 AT HIGH RISK FOR BREAST CANCER: ICD-10-CM

## 2022-10-13 PROCEDURE — 0 GADOBENATE DIMEGLUMINE 529 MG/ML SOLUTION: Performed by: OBSTETRICS & GYNECOLOGY

## 2022-10-13 PROCEDURE — A9577 INJ MULTIHANCE: HCPCS | Performed by: OBSTETRICS & GYNECOLOGY

## 2022-10-13 PROCEDURE — 77049 MRI BREAST C-+ W/CAD BI: CPT

## 2022-10-13 PROCEDURE — 77049 MRI BREAST C-+ W/CAD BI: CPT | Performed by: RADIOLOGY

## 2022-10-13 RX ADMIN — GADOBENATE DIMEGLUMINE 15 ML: 529 INJECTION, SOLUTION INTRAVENOUS at 15:04

## 2022-10-17 ENCOUNTER — OFFICE VISIT (OUTPATIENT)
Dept: NEUROSURGERY | Facility: CLINIC | Age: 52
End: 2022-10-17

## 2022-10-17 VITALS
DIASTOLIC BLOOD PRESSURE: 88 MMHG | SYSTOLIC BLOOD PRESSURE: 140 MMHG | WEIGHT: 156.2 LBS | BODY MASS INDEX: 25.1 KG/M2 | HEIGHT: 66 IN | TEMPERATURE: 97.5 F

## 2022-10-17 DIAGNOSIS — M54.12 CERVICAL RADICULOPATHY: Primary | ICD-10-CM

## 2022-10-17 PROBLEM — E66.3 OVERWEIGHT WITH BODY MASS INDEX (BMI) 25.0-29.9: Status: ACTIVE | Noted: 2022-10-17

## 2022-10-17 PROBLEM — F32.A MILD DEPRESSION: Status: ACTIVE | Noted: 2022-10-17

## 2022-10-17 PROBLEM — R73.9 HYPERGLYCEMIA: Status: ACTIVE | Noted: 2022-10-17

## 2022-10-17 PROBLEM — L65.9 LOSS OF HAIR: Status: ACTIVE | Noted: 2022-10-17

## 2022-10-17 PROBLEM — E78.1 HYPERTRIGLYCERIDEMIA: Status: ACTIVE | Noted: 2022-10-17

## 2022-10-17 PROBLEM — G43.909 MIGRAINE: Status: ACTIVE | Noted: 2022-10-17

## 2022-10-17 PROBLEM — K21.9 GASTROESOPHAGEAL REFLUX DISEASE: Status: ACTIVE | Noted: 2022-10-17

## 2022-10-17 PROBLEM — M25.511 CHRONIC RIGHT SHOULDER PAIN: Status: ACTIVE | Noted: 2022-10-17

## 2022-10-17 PROBLEM — G89.29 CHRONIC RIGHT SHOULDER PAIN: Status: ACTIVE | Noted: 2022-10-17

## 2022-10-17 PROBLEM — F41.9 ANXIETY: Status: ACTIVE | Noted: 2022-10-17

## 2022-10-17 PROBLEM — E55.9 VITAMIN D DEFICIENCY: Status: ACTIVE | Noted: 2022-10-17

## 2022-10-17 PROBLEM — E53.8 COBALAMIN DEFICIENCY: Status: ACTIVE | Noted: 2022-10-17

## 2022-10-17 PROCEDURE — 99204 OFFICE O/P NEW MOD 45 MIN: CPT | Performed by: NEUROLOGICAL SURGERY

## 2022-10-17 NOTE — PROGRESS NOTES
NAME: BRIAN GARCIA   DOS: 10/17/2022  : 1970  PCP: Dominique Blunt PA-C    Chief Complaint:    Chief Complaint   Patient presents with   • Neck & right arm pain       History of Present Illness:  51 y.o. female   Is a 31-year-old female in neurosurgical consultation.  She works teaching special ed and reports a 2-year history of cervical neck discomfort the pain began subacutely in the right parascapular area radiates into the interestingly fourth and fifth digits she has been through therapy injections and really has significant discomfort.  She is undergone physical therapy for frozen shoulder that resolved but the pain is still ever present she cannot sleep and is really grading on her quality of life    She seen another surgeon who recommended against surgery by her reports and she is here for evaluation    PMHX  Allergies:  Allergies   Allergen Reactions   • Sulfa Antibiotics Rash   • Sulfamethoxazole Rash   • Trimethoprim Unknown - Low Severity     Medications    Current Outpatient Medications:   •  buPROPion XL (WELLBUTRIN XL) 300 MG 24 hr tablet, TAKE ONE TABLET BY MOUTH EVERY DAY, Disp: 30 tablet, Rfl: 0  •  Cholecalciferol (VITAMIN D3) 2000 UNITS tablet, TK 1 T PO QD, Disp: , Rfl: 5  •  Cyanocobalamin (B-12 PO), Take  by mouth., Disp: , Rfl:   •  docusate sodium (Colace) 100 MG capsule, Take 1 capsule by mouth 2 (Two) Times a Day As Needed for Constipation., Disp: 60 capsule, Rfl: 1  •  famotidine (PEPCID) 10 MG tablet, Take 1 tablet by mouth 2 (Two) Times a Day., Disp: 180 tablet, Rfl: 1  •  ferrous sulfate 325 (65 FE) MG tablet, Take 325 mg by mouth Daily With Breakfast., Disp: , Rfl:   •  levonorgestrel-ethinyl estradiol (SEASONALE) 0.15-0.03 MG per tablet, TAKE ONE TABLET BY MOUTH EVERY DAY, Disp: 91 tablet, Rfl: 0  •  SUMAtriptan (IMITREX) 50 MG tablet, Take  by mouth., Disp: , Rfl:   •  Vascepa 1 g capsule capsule, Take 2 g by mouth 2 (Two) Times a Day With Meals., Disp: 120 capsule,  Rfl: 0  •  ibuprofen (ADVIL,MOTRIN) 600 MG tablet, Take 1 tablet by mouth Every 6 (Six) Hours As Needed for Mild Pain ., Disp: 60 tablet, Rfl: 1  •  Icosapent Ethyl (VASCEPA PO), Take  by mouth., Disp: , Rfl:   •  nitrofurantoin, macrocrystal-monohydrate, (Macrobid) 100 MG capsule, Take 1 capsule by mouth 2 (Two) Times a Day., Disp: 14 capsule, Rfl: 0  Past Medical History:  Past Medical History:   Diagnosis Date   • Diabetes (HCC)    • Hypercholesteremia    • Migraines      Past Surgical History:  Past Surgical History:   Procedure Laterality Date   •  SECTION  2010    Boy: Hitesh   •  SECTION PRIMARY  2002    Boy: [place]   • CYSTOSCOPY      X 2   • EYE SURGERY     • HYSTEROSCOPY W/ POLYPECTOMY  2022    with myosure lite, D&C     Social Hx:  Social History     Tobacco Use   • Smoking status: Never   • Smokeless tobacco: Never   Vaping Use   • Vaping Use: Never used   Substance Use Topics   • Alcohol use: No   • Drug use: No     Family Hx:  Family History   Problem Relation Age of Onset   • Cancer Mother    • Asthma Mother    • Breast cancer Mother 71   • Cancer Father    • Arthritis Father    • Breast cancer Father 68   • Diabetes Father    • Cancer Maternal Grandmother    • Diabetes Maternal Grandmother    • Ovarian cancer Maternal Grandmother    • Heart disease Maternal Grandfather    • Diabetes Paternal Grandmother    • Coronary artery disease Paternal Grandfather    • Stroke Paternal Grandfather    • Colon cancer Neg Hx      Review of Systems:        Review of Systems   Constitutional: Positive for diaphoresis. Negative for activity change, appetite change, chills, fatigue, fever and unexpected weight change.   HENT: Negative for congestion, dental problem, drooling, ear discharge, ear pain, facial swelling, hearing loss, mouth sores, nosebleeds, postnasal drip, rhinorrhea, sinus pressure, sinus pain, sneezing, sore throat, tinnitus, trouble swallowing and voice change.    Eyes:  Negative for photophobia, pain, discharge, redness, itching and visual disturbance.   Respiratory: Negative for apnea, cough, choking, chest tightness, shortness of breath, wheezing and stridor.    Cardiovascular: Negative for chest pain, palpitations and leg swelling.   Gastrointestinal: Negative for abdominal distention, abdominal pain, anal bleeding, blood in stool, constipation, diarrhea, nausea, rectal pain and vomiting.   Endocrine: Negative for cold intolerance, heat intolerance, polydipsia, polyphagia and polyuria.   Genitourinary: Negative for decreased urine volume, difficulty urinating, dysuria, enuresis, flank pain, frequency, genital sores, hematuria and urgency.   Musculoskeletal: Negative for arthralgias, back pain, gait problem, joint swelling, myalgias, neck pain and neck stiffness.   Skin: Negative for color change, pallor, rash and wound.   Allergic/Immunologic: Negative for environmental allergies, food allergies and immunocompromised state.   Neurological: Positive for numbness and headaches. Negative for dizziness, tremors, seizures, syncope, facial asymmetry, speech difficulty, weakness and light-headedness.   Hematological: Negative for adenopathy. Does not bruise/bleed easily.   Psychiatric/Behavioral: Negative for agitation, behavioral problems, confusion, decreased concentration, dysphoric mood, hallucinations, self-injury, sleep disturbance and suicidal ideas. The patient is not nervous/anxious and is not hyperactive.       I have reviewed this note template and all pertinent parts of the review of systems social, family history, surgical history and medication list      Physical Examination:  Vitals:    10/17/22 1118   BP: 140/88   Temp: 97.5 °F (36.4 °C)      General Appearance:   Well developed, well nourished, well groomed, alert, and cooperative.  Neurological examination:  Neurologic Exam  Vital signs were reviewed and documented in the chart  Patient appeared in good neurologic  "function with normal comprehension fluent speech  Mood and affect are normal  Sense of smell deferred    Pupils symmetric equally reactive funduscopic exam not visualized   Visual fields intact to confrontation  Extraocular movements intact  Face motor function is symmetric  Facial sensations normal  Hearing intact to finger rub hearing intact to finger rub  Tongue is midline  Palate symmetric  Swallowing normal  Shoulder shrug normal  No signs of intrinsic shoulder dysfunction  Muscle bulk and tone normal  5 out of 5 strength no motor drift  Gait normal intact  Negative Romberg  No clonus long tract signs or myelopathy    Reflexes symmetric trace perhaps decreased right C6  No edema noted and extremities skin appears normal  Negative Tinel's at the wrist and elbow        Review of Imaging/DATA:  MRI shows right-sided C5-6 disc osteophyte complex with slight cord compression there is a right eccentric disc with an intraforaminal component of disc material I reviewed these films personally  Diagnoses/Plan:    Ms. Manning is a 51 y.o. female   1.  Right-sided C6 subacute radiculopathy I suspect her \"frozen shoulder \"was resultant pathology of concomitant C6 nerve root irritation she has daily chronic right-sided C6 distribution pain in the right parascapular supraspinatus and intermittently into the arm I explained that the fourth and fifth digit numbness may be ulnar pathology from all her paperwork    I explained the risk benefits and expected outcome of major elective surgery for their problem, complications from approach, and infection, the risk of neurologic implications after surgery as well as need for repeat surgeries and most importantly failure to achieve quality of life improvement from the surgery to the patient.    I explained that surgery does not help with frustrations she has a quite active lifestyle and that I explained there is no quick fixes    After discussions I think it is reasonable to pursue a " right-sided approach to a C5-6 anterior cervical discectomy fusion I suspect will help with some but not all of her complaints but if there is any role to have surgery expedite her recovery this will be as she certainly failed other measures    Has been a pleasure provide neurosurgical care she wishes to proceed

## 2022-10-18 ENCOUNTER — PREP FOR SURGERY (OUTPATIENT)
Dept: OTHER | Facility: HOSPITAL | Age: 52
End: 2022-10-18

## 2022-10-18 DIAGNOSIS — M54.12 CERVICAL RADICULOPATHY AT C6: Primary | ICD-10-CM

## 2022-10-18 RX ORDER — HYDROCODONE BITARTRATE AND ACETAMINOPHEN 7.5; 325 MG/1; MG/1
1 TABLET ORAL ONCE
Status: CANCELLED | OUTPATIENT
Start: 2022-10-18 | End: 2022-10-18

## 2022-10-18 RX ORDER — FAMOTIDINE 20 MG/1
20 TABLET, FILM COATED ORAL
Status: CANCELLED | OUTPATIENT
Start: 2022-10-18

## 2022-10-18 RX ORDER — IBUPROFEN 800 MG/1
800 TABLET ORAL ONCE
Status: CANCELLED | OUTPATIENT
Start: 2022-10-18 | End: 2022-10-18

## 2022-10-18 RX ORDER — SODIUM CHLORIDE, SODIUM LACTATE, POTASSIUM CHLORIDE, CALCIUM CHLORIDE 600; 310; 30; 20 MG/100ML; MG/100ML; MG/100ML; MG/100ML
9 INJECTION, SOLUTION INTRAVENOUS CONTINUOUS
Status: CANCELLED | OUTPATIENT
Start: 2022-10-18

## 2022-10-18 RX ORDER — CHLORHEXIDINE GLUCONATE 4 G/100ML
SOLUTION TOPICAL
Qty: 120 ML | Refills: 0 | Status: ON HOLD | OUTPATIENT
Start: 2022-10-18 | End: 2022-12-13

## 2022-10-18 RX ORDER — SODIUM CHLORIDE 0.9 % (FLUSH) 0.9 %
10 SYRINGE (ML) INJECTION EVERY 12 HOURS SCHEDULED
Status: CANCELLED | OUTPATIENT
Start: 2022-10-18

## 2022-10-18 RX ORDER — SODIUM CHLORIDE 0.9 % (FLUSH) 0.9 %
1-10 SYRINGE (ML) INJECTION AS NEEDED
Status: CANCELLED | OUTPATIENT
Start: 2022-10-18

## 2022-10-18 RX ORDER — CEFAZOLIN SODIUM 2 G/100ML
2 INJECTION, SOLUTION INTRAVENOUS ONCE
Status: CANCELLED | OUTPATIENT
Start: 2022-10-18 | End: 2022-10-18

## 2022-10-18 RX ORDER — ACETAMINOPHEN 325 MG/1
650 TABLET ORAL ONCE
Status: CANCELLED | OUTPATIENT
Start: 2022-10-18 | End: 2022-10-18

## 2022-10-19 ENCOUNTER — TELEPHONE (OUTPATIENT)
Dept: MRI IMAGING | Facility: HOSPITAL | Age: 52
End: 2022-10-19

## 2022-11-07 ENCOUNTER — TELEPHONE (OUTPATIENT)
Dept: FAMILY MEDICINE CLINIC | Facility: CLINIC | Age: 52
End: 2022-11-07

## 2022-11-07 PROBLEM — M54.12 CERVICAL RADICULOPATHY AT C6: Status: ACTIVE | Noted: 2022-11-07

## 2022-11-07 NOTE — TELEPHONE ENCOUNTER
FLORESITA MAHARAJ PATIENT   pt called back wanting referral for PT.  She has appt tomorrow for PT.

## 2022-11-14 ENCOUNTER — OFFICE VISIT (OUTPATIENT)
Dept: FAMILY MEDICINE CLINIC | Facility: CLINIC | Age: 52
End: 2022-11-14

## 2022-11-14 VITALS
DIASTOLIC BLOOD PRESSURE: 78 MMHG | WEIGHT: 157.4 LBS | OXYGEN SATURATION: 98 % | TEMPERATURE: 96.6 F | HEIGHT: 66 IN | BODY MASS INDEX: 25.3 KG/M2 | HEART RATE: 96 BPM | SYSTOLIC BLOOD PRESSURE: 122 MMHG

## 2022-11-14 DIAGNOSIS — M54.2 NECK PAIN, MUSCULOSKELETAL: Primary | ICD-10-CM

## 2022-11-14 PROCEDURE — 96372 THER/PROPH/DIAG INJ SC/IM: CPT | Performed by: FAMILY MEDICINE

## 2022-11-14 PROCEDURE — 99213 OFFICE O/P EST LOW 20 MIN: CPT | Performed by: FAMILY MEDICINE

## 2022-11-14 RX ORDER — METHOCARBAMOL 750 MG/1
750 TABLET, FILM COATED ORAL 3 TIMES DAILY PRN
Qty: 30 TABLET | Refills: 1 | Status: SHIPPED | OUTPATIENT
Start: 2022-11-14 | End: 2022-11-29

## 2022-11-14 RX ORDER — KETOROLAC TROMETHAMINE 30 MG/ML
60 INJECTION, SOLUTION INTRAMUSCULAR; INTRAVENOUS ONCE
Status: COMPLETED | OUTPATIENT
Start: 2022-11-14 | End: 2022-11-14

## 2022-11-14 RX ORDER — TRIAMCINOLONE ACETONIDE 40 MG/ML
80 INJECTION, SUSPENSION INTRA-ARTICULAR; INTRAMUSCULAR ONCE
Status: COMPLETED | OUTPATIENT
Start: 2022-11-14 | End: 2022-11-14

## 2022-11-14 RX ADMIN — KETOROLAC TROMETHAMINE 60 MG: 30 INJECTION, SOLUTION INTRAMUSCULAR; INTRAVENOUS at 11:34

## 2022-11-14 RX ADMIN — TRIAMCINOLONE ACETONIDE 80 MG: 40 INJECTION, SUSPENSION INTRA-ARTICULAR; INTRAMUSCULAR at 11:34

## 2022-11-14 NOTE — PROGRESS NOTES
Date: 2022   Patient Name: Francesca Manning  : 1970   MRN: [id number]     Chief Complaint:    Chief Complaint   Patient presents with   • Neck Pain     Pain started last friday       History of Present Illness: Francesca Manning is a 51 y.o. female who is here today for A 3-4 day history of left-sided neck pain, specifically in the cervical spine area . Patient was also having pain in her left ear but she stated that she felt that was secondary to the neck pain she was currently experiencing. Patient denied any traumatic event preceding her appointment today but she does have a history of multiple bulging disks in her cervical spine as well as cervical radiculopathy of the left upper extremity.  She feels that she may have slept wrong and this has resulted in muscle spasm in her left neck.           Review of Systems:   Review of Systems   Constitutional: Negative for chills, fatigue and fever.   Respiratory: Negative for cough and shortness of breath.    Cardiovascular: Negative for chest pain and palpitations.   Gastrointestinal: Negative for abdominal pain, constipation, diarrhea, nausea and vomiting.   Musculoskeletal: Positive for neck pain and neck stiffness. Negative for back pain and myalgias.   Neurological: Negative for dizziness and headache.   Psychiatric/Behavioral: Negative for depressed mood. The patient is not nervous/anxious.        Past Medical History:   Past Medical History:   Diagnosis Date   • Diabetes (HCC)    • Hypercholesteremia    • Migraines        Past Surgical History:   Past Surgical History:   Procedure Laterality Date   •  SECTION  2010    Boy: Hitesh   •  SECTION PRIMARY  2002    Boy: [place]   • CYSTOSCOPY      X 2   • EYE SURGERY     • HYSTEROSCOPY W/ POLYPECTOMY  2022    with myosure lite, D&PETRA       Family History:   Family History   Problem Relation Age of Onset   • Cancer Mother    • Asthma Mother    • Breast cancer Mother 71    • Cancer Father    • Arthritis Father    • Breast cancer Father 68   • Diabetes Father    • Cancer Maternal Grandmother    • Diabetes Maternal Grandmother    • Ovarian cancer Maternal Grandmother    • Heart disease Maternal Grandfather    • Diabetes Paternal Grandmother    • Coronary artery disease Paternal Grandfather    • Stroke Paternal Grandfather    • Colon cancer Neg Hx        Social History:   Social History     Socioeconomic History   • Marital status:    Tobacco Use   • Smoking status: Never   • Smokeless tobacco: Never   Vaping Use   • Vaping Use: Never used   Substance and Sexual Activity   • Alcohol use: No   • Drug use: No   • Sexual activity: Yes     Partners: Male     Birth control/protection: OCP       Medications:     Current Outpatient Medications:   •  buPROPion XL (WELLBUTRIN XL) 300 MG 24 hr tablet, TAKE ONE TABLET BY MOUTH EVERY DAY, Disp: 30 tablet, Rfl: 0  •  chlorhexidine (HIBICLENS) 4 % external liquid, Shower each day with solution for 5 days beginning 5 days before surgery., Disp: 120 mL, Rfl: 0  •  Cholecalciferol (VITAMIN D3) 2000 UNITS tablet, TK 1 T PO QD, Disp: , Rfl: 5  •  Cyanocobalamin (B-12 PO), Take  by mouth., Disp: , Rfl:   •  docusate sodium (Colace) 100 MG capsule, Take 1 capsule by mouth 2 (Two) Times a Day As Needed for Constipation., Disp: 60 capsule, Rfl: 1  •  famotidine (PEPCID) 10 MG tablet, Take 1 tablet by mouth 2 (Two) Times a Day., Disp: 180 tablet, Rfl: 1  •  ferrous sulfate 325 (65 FE) MG tablet, Take 325 mg by mouth Daily With Breakfast., Disp: , Rfl:   •  ibuprofen (ADVIL,MOTRIN) 600 MG tablet, Take 1 tablet by mouth Every 6 (Six) Hours As Needed for Mild Pain ., Disp: 60 tablet, Rfl: 1  •  Icosapent Ethyl (VASCEPA PO), Take  by mouth., Disp: , Rfl:   •  levonorgestrel-ethinyl estradiol (SEASONALE) 0.15-0.03 MG per tablet, TAKE ONE TABLET BY MOUTH EVERY DAY, Disp: 91 tablet, Rfl: 0  •  methocarbamol (ROBAXIN) 750 MG tablet, Take 1 tablet by mouth 3  "(Three) Times a Day As Needed for Muscle Spasms., Disp: 30 tablet, Rfl: 1  •  mupirocin (BACTROBAN) 2 % nasal ointment, Apply to the inside of each nostril with a cotton swab two times daily, morning and evening, for 5 days before surgery., Disp: 10 each, Rfl: 0  •  nitrofurantoin, macrocrystal-monohydrate, (Macrobid) 100 MG capsule, Take 1 capsule by mouth 2 (Two) Times a Day., Disp: 14 capsule, Rfl: 0  •  SUMAtriptan (IMITREX) 50 MG tablet, Take  by mouth., Disp: , Rfl:   •  Vascepa 1 g capsule capsule, Take 2 g by mouth 2 (Two) Times a Day With Meals., Disp: 120 capsule, Rfl: 0  No current facility-administered medications for this visit.    Allergies:   Allergies   Allergen Reactions   • Sulfa Antibiotics Rash   • Sulfamethoxazole Rash   • Trimethoprim Unknown - Low Severity         Physical Exam:  Vital Signs:   Vitals:    11/14/22 1038   BP: 122/78   BP Location: Left arm   Patient Position: Sitting   Cuff Size: Adult   Pulse: 96   Temp: 96.6 °F (35.9 °C)   TempSrc: Temporal   SpO2: 98%   Weight: 71.4 kg (157 lb 6.4 oz)   Height: 167.6 cm (66\")   PainSc:   5   PainLoc: Neck     Body mass index is 25.41 kg/m².     Physical Exam  Vitals and nursing note reviewed.   Constitutional:       Appearance: Normal appearance.   Cardiovascular:      Rate and Rhythm: Normal rate and regular rhythm.      Heart sounds: Normal heart sounds. No murmur heard.  Pulmonary:      Effort: Pulmonary effort is normal.      Breath sounds: Normal breath sounds. No wheezing.   Musculoskeletal:      Comments: Significant spasm and hypertonicity of left trapezius and SCM with decreased range of motion in cervical rotation, flexion and extension   Neurological:      Mental Status: She is alert and oriented to person, place, and time. Mental status is at baseline.   Psychiatric:         Mood and Affect: Mood normal.         Behavior: Behavior normal.           Assessment/Plan:   Diagnoses and all orders for this visit:    1. Neck pain, " musculoskeletal (Primary)  Assessment & Plan:  Kenalog and Toradol in office and Rx methocarbamol to use as needed.  Patient will call if symptoms are persistent or worsening    Orders:  -     triamcinolone acetonide (KENALOG-40) injection 80 mg  -     ketorolac (TORADOL) injection 60 mg  -     methocarbamol (ROBAXIN) 750 MG tablet; Take 1 tablet by mouth 3 (Three) Times a Day As Needed for Muscle Spasms.  Dispense: 30 tablet; Refill: 1         Follow Up:   Return if symptoms worsen or fail to improve.    Yoselyn Durham, DO  [facility]

## 2022-11-14 NOTE — ASSESSMENT & PLAN NOTE
Kenalog and Toradol in office and Rx methocarbamol to use as needed.  Patient will call if symptoms are persistent or worsening

## 2022-11-15 DIAGNOSIS — F32.A MILD DEPRESSION: ICD-10-CM

## 2022-11-15 RX ORDER — BUPROPION HYDROCHLORIDE 300 MG/1
TABLET ORAL
Qty: 30 TABLET | Refills: 0 | OUTPATIENT
Start: 2022-11-15

## 2022-11-19 DIAGNOSIS — Z00.00 PHYSICAL EXAM: Primary | ICD-10-CM

## 2022-11-19 DIAGNOSIS — F32.A MILD DEPRESSION: ICD-10-CM

## 2022-11-19 DIAGNOSIS — R73.9 HYPERGLYCEMIA: ICD-10-CM

## 2022-11-19 DIAGNOSIS — E55.9 VITAMIN D DEFICIENCY: ICD-10-CM

## 2022-11-19 RX ORDER — BUPROPION HYDROCHLORIDE 300 MG/1
300 TABLET ORAL DAILY
Qty: 30 TABLET | Refills: 0 | Status: SHIPPED | OUTPATIENT
Start: 2022-11-19 | End: 2022-11-29 | Stop reason: SDUPTHER

## 2022-11-29 ENCOUNTER — OFFICE VISIT (OUTPATIENT)
Dept: FAMILY MEDICINE CLINIC | Facility: CLINIC | Age: 52
End: 2022-11-29

## 2022-11-29 VITALS
HEART RATE: 97 BPM | OXYGEN SATURATION: 98 % | WEIGHT: 157 LBS | SYSTOLIC BLOOD PRESSURE: 124 MMHG | BODY MASS INDEX: 25.23 KG/M2 | DIASTOLIC BLOOD PRESSURE: 70 MMHG | HEIGHT: 66 IN

## 2022-11-29 DIAGNOSIS — E78.1 HYPERTRIGLYCERIDEMIA: ICD-10-CM

## 2022-11-29 DIAGNOSIS — F32.A MILD DEPRESSION: ICD-10-CM

## 2022-11-29 DIAGNOSIS — R73.9 HYPERGLYCEMIA: ICD-10-CM

## 2022-11-29 DIAGNOSIS — M54.12 CERVICAL RADICULOPATHY AT C6: ICD-10-CM

## 2022-11-29 DIAGNOSIS — E55.9 VITAMIN D DEFICIENCY: ICD-10-CM

## 2022-11-29 DIAGNOSIS — L65.9 ALOPECIA: ICD-10-CM

## 2022-11-29 DIAGNOSIS — K21.9 GASTROESOPHAGEAL REFLUX DISEASE WITHOUT ESOPHAGITIS: ICD-10-CM

## 2022-11-29 DIAGNOSIS — Z00.00 PHYSICAL EXAM: Primary | ICD-10-CM

## 2022-11-29 PROCEDURE — 99396 PREV VISIT EST AGE 40-64: CPT | Performed by: PHYSICIAN ASSISTANT

## 2022-11-29 PROCEDURE — 99213 OFFICE O/P EST LOW 20 MIN: CPT | Performed by: PHYSICIAN ASSISTANT

## 2022-11-29 RX ORDER — BUPROPION HYDROCHLORIDE 300 MG/1
300 TABLET ORAL DAILY
Qty: 90 TABLET | Refills: 3 | Status: SHIPPED | OUTPATIENT
Start: 2022-11-29

## 2022-11-29 RX ORDER — FAMOTIDINE 10 MG
10 TABLET ORAL 2 TIMES DAILY
Qty: 180 TABLET | Refills: 3 | Status: SHIPPED | OUTPATIENT
Start: 2022-11-29 | End: 2023-01-25 | Stop reason: SDUPTHER

## 2022-11-29 RX ORDER — ICOSAPENT ETHYL 1000 MG/1
2 CAPSULE ORAL 2 TIMES DAILY WITH MEALS
Qty: 120 CAPSULE | Refills: 0 | Status: SHIPPED | OUTPATIENT
Start: 2022-11-29

## 2022-11-29 RX ORDER — SPIRONOLACTONE 25 MG/1
25 TABLET ORAL DAILY
Qty: 90 TABLET | Refills: 3 | Status: SHIPPED | OUTPATIENT
Start: 2022-11-29

## 2022-11-29 NOTE — PROGRESS NOTES
"Chief Complaint  Annual Exam    Subjective        Francesca Manning presents to [facility]  History of Present Illness  Patient reports today for physical exam and will return to clinic for fasting blood work.    Patient reports she continues to have chronic neck pain states she is scheduled for surgery with Dr. Enriquez secondary to cervical radiculopathy of C6.  Patient however reports she continues to get PT 2-3 times weekly.    Patient reports having anxiety and depression states Wellbutrin works well for her symptoms and she would like to continue.    Patient reports having alopecia and thinning of her hair states she discontinued spironolactone months ago and reports that shedding has returned and she would like to restart spironolactone    Patient has elevated triglycerides states she takes Vascepa daily and attempts diet modifications.  Patient also reports a history of elevated blood sugar..    Patient reports having acid reflux states she takes famotidine as needed for relief of symptoms      Objective   Vital Signs:  /70 (BP Location: Right arm, Patient Position: Sitting, Cuff Size: Large Adult)   Pulse 97   Ht 167.6 cm (66\")   Wt 71.2 kg (157 lb)   SpO2 98%   BMI 25.34 kg/m²   Estimated body mass index is 25.34 kg/m² as calculated from the following:    Height as of this encounter: 167.6 cm (66\").    Weight as of this encounter: 71.2 kg (157 lb).          Physical Exam  Vitals and nursing note reviewed.   Constitutional:       General: She is not in acute distress.     Appearance: She is not ill-appearing.   HENT:      Head: Normocephalic.      Right Ear: Tympanic membrane, ear canal and external ear normal.      Left Ear: Tympanic membrane, ear canal and external ear normal.      Nose: Nose normal.      Mouth/Throat:      Mouth: Mucous membranes are moist.   Eyes:      Pupils: Pupils are equal, round, and reactive to light.   Cardiovascular:      Rate and Rhythm: " Normal rate and regular rhythm.      Pulses: Normal pulses.   Pulmonary:      Effort: Pulmonary effort is normal. No respiratory distress.   Abdominal:      General: Bowel sounds are normal.      Palpations: Abdomen is soft.      Tenderness: There is no abdominal tenderness. There is no guarding or rebound.   Musculoskeletal:         General: Normal range of motion.      Cervical back: Normal range of motion.   Skin:     General: Skin is warm and dry.      Capillary Refill: Capillary refill takes less than 2 seconds.   Neurological:      General: No focal deficit present.      Mental Status: She is oriented to person, place, and time.   Psychiatric:         Mood and Affect: Mood normal.        Result Review :                Assessment and Plan   Diagnoses and all orders for this visit:    1. Physical exam (Primary)  Assessment & Plan:  Discussed injury prevention, diet and exercise, safe sexual practices, and screening for common diseases. Encouraged use of sunscreen and seatbelts. Encouraged SBE, avoidance of tobacco, limiting alcohol, and yearly dental and eye exams.         2. Cervical radiculopathy at C6  Assessment & Plan:  Continue current treatment plan with Dr Enriquez and PT      3. Mild depression  Assessment & Plan:  Patient's depression is recurrent and is mild without psychosis. Their depression is currently in partial remission and the condition is improving with treatment. This will be reassessed at the next regular appointment. F/U as described:patient will continue current medication therapy.    Orders:  -     buPROPion XL (WELLBUTRIN XL) 300 MG 24 hr tablet; Take 1 tablet by mouth Daily.  Dispense: 90 tablet; Refill: 3    4. Alopecia  Assessment & Plan:  Will restart spironalactone and she plans to re-establish with Dermatology    Orders:  -     spironolactone (Aldactone) 25 MG tablet; Take 1 tablet by mouth Daily.  Dispense: 90 tablet; Refill: 3    5. Hyperglycemia  Assessment & Plan:  Will check  Ha1c      6. Hypertriglyceridemia  Assessment & Plan:  Continue current treatment plan, will return to clinic for fasting lipids      Orders:  -     Vascepa 1 g capsule capsule; Take 2 g by mouth 2 (Two) Times a Day With Meals.  Dispense: 120 capsule; Refill: 0    7. Vitamin D deficiency  Assessment & Plan:  Will check blood work      8. Gastroesophageal reflux disease without esophagitis  Assessment & Plan:  Continue current treatment plan    Orders:  -     famotidine (PEPCID) 10 MG tablet; Take 1 tablet by mouth 2 (Two) Times a Day.  Dispense: 180 tablet; Refill: 3           Follow Up   No follow-ups on file.  Patient was given instructions and counseling regarding her condition or for health maintenance advice. Please see specific information pulled into the AVS if appropriate.

## 2022-12-03 LAB
25(OH)D3+25(OH)D2 SERPL-MCNC: 39.3 NG/ML (ref 30–100)
ALBUMIN SERPL-MCNC: 4.6 G/DL (ref 3.8–4.9)
ALBUMIN/GLOB SERPL: 2 {RATIO} (ref 1.2–2.2)
ALP SERPL-CCNC: 74 IU/L (ref 44–121)
ALT SERPL-CCNC: 20 IU/L (ref 0–32)
AST SERPL-CCNC: 10 IU/L (ref 0–40)
BASOPHILS # BLD AUTO: 0 X10E3/UL (ref 0–0.2)
BASOPHILS NFR BLD AUTO: 1 %
BILIRUB SERPL-MCNC: 0.7 MG/DL (ref 0–1.2)
BUN SERPL-MCNC: 11 MG/DL (ref 6–24)
BUN/CREAT SERPL: 12 (ref 9–23)
CALCIUM SERPL-MCNC: 9.2 MG/DL (ref 8.7–10.2)
CHLORIDE SERPL-SCNC: 99 MMOL/L (ref 96–106)
CHOLEST SERPL-MCNC: 227 MG/DL (ref 100–199)
CO2 SERPL-SCNC: 21 MMOL/L (ref 20–29)
CREAT SERPL-MCNC: 0.9 MG/DL (ref 0.57–1)
EGFRCR SERPLBLD CKD-EPI 2021: 77 ML/MIN/1.73
EOSINOPHIL # BLD AUTO: 0.1 X10E3/UL (ref 0–0.4)
EOSINOPHIL NFR BLD AUTO: 1 %
ERYTHROCYTE [DISTWIDTH] IN BLOOD BY AUTOMATED COUNT: 12.5 % (ref 11.7–15.4)
GLOBULIN SER CALC-MCNC: 2.3 G/DL (ref 1.5–4.5)
GLUCOSE SERPL-MCNC: 72 MG/DL (ref 70–99)
HBA1C MFR BLD: 5 % (ref 4.8–5.6)
HCT VFR BLD AUTO: 45.1 % (ref 34–46.6)
HDLC SERPL-MCNC: 44 MG/DL
HGB BLD-MCNC: 15 G/DL (ref 11.1–15.9)
IMM GRANULOCYTES # BLD AUTO: 0 X10E3/UL (ref 0–0.1)
IMM GRANULOCYTES NFR BLD AUTO: 0 %
LDLC SERPL CALC-MCNC: 160 MG/DL (ref 0–99)
LYMPHOCYTES # BLD AUTO: 2.1 X10E3/UL (ref 0.7–3.1)
LYMPHOCYTES NFR BLD AUTO: 26 %
MCH RBC QN AUTO: 31 PG (ref 26.6–33)
MCHC RBC AUTO-ENTMCNC: 33.3 G/DL (ref 31.5–35.7)
MCV RBC AUTO: 93 FL (ref 79–97)
MONOCYTES # BLD AUTO: 0.6 X10E3/UL (ref 0.1–0.9)
MONOCYTES NFR BLD AUTO: 7 %
NEUTROPHILS # BLD AUTO: 5.3 X10E3/UL (ref 1.4–7)
NEUTROPHILS NFR BLD AUTO: 65 %
PLATELET # BLD AUTO: 304 X10E3/UL (ref 150–450)
POTASSIUM SERPL-SCNC: 4.2 MMOL/L (ref 3.5–5.2)
PROT SERPL-MCNC: 6.9 G/DL (ref 6–8.5)
RBC # BLD AUTO: 4.84 X10E6/UL (ref 3.77–5.28)
SODIUM SERPL-SCNC: 136 MMOL/L (ref 134–144)
TRIGL SERPL-MCNC: 127 MG/DL (ref 0–149)
TSH SERPL DL<=0.005 MIU/L-ACNC: 1.9 UIU/ML (ref 0.45–4.5)
VLDLC SERPL CALC-MCNC: 23 MG/DL (ref 5–40)
WBC # BLD AUTO: 8.1 X10E3/UL (ref 3.4–10.8)

## 2022-12-07 ENCOUNTER — PRE-ADMISSION TESTING (OUTPATIENT)
Dept: PREADMISSION TESTING | Facility: HOSPITAL | Age: 52
End: 2022-12-07

## 2022-12-07 VITALS — BODY MASS INDEX: 25.05 KG/M2 | WEIGHT: 155.87 LBS | HEIGHT: 66 IN

## 2022-12-07 DIAGNOSIS — M54.12 CERVICAL RADICULOPATHY AT C6: ICD-10-CM

## 2022-12-07 LAB
QT INTERVAL: 352 MS
QTC INTERVAL: 415 MS

## 2022-12-07 PROCEDURE — 93005 ELECTROCARDIOGRAM TRACING: CPT

## 2022-12-07 PROCEDURE — 93010 ELECTROCARDIOGRAM REPORT: CPT | Performed by: INTERNAL MEDICINE

## 2022-12-07 PROCEDURE — 36415 COLL VENOUS BLD VENIPUNCTURE: CPT

## 2022-12-07 PROCEDURE — 87081 CULTURE SCREEN ONLY: CPT

## 2022-12-07 RX ORDER — FAMOTIDINE 20 MG/1
20 TABLET, FILM COATED ORAL EVERY OTHER DAY
COMMUNITY
End: 2023-03-15

## 2022-12-07 NOTE — PAT
Bactroban (if prescribed) and Chlorhexidine Prescription prescribed by physician before PAT visit.  Verified with patient that medication(s) were picked up from their pharmacy.  Written instructions given to patient during PAT visit.  Patient/family also instructed to complete skin prep checklist and return the checklist on the day of surgery to preoperative staff.  Patient/family verbalized understanding.    Patient viewed general PAT education video as instructed in their preoperative information received from their surgeon.  Patient stated the general PAT education video was viewed in its entirety and survey completed.  Copies of PAT general education handouts (Incentive Spirometry, Meds to Beds Program, Patient Belongings, Pre-op skin preparation instructions, Blood Glucose testing, Visitor policy, Surgery FAQ, Code H) distributed to patient if not printed. Education related to the PAT pass and skin preparation for surgery (if applicable) completed in PAT as a reinforcement to PAT education video. Patient instructed to return PAT pass provided today as well as completed skin preparation sheet (if applicable) on the day of procedure.     Additionally if patient had not viewed video yet but intended to view it at home or in our waiting area, then referred them to the handout with QR code/link provided during PAT visit.  Instructed patient to complete survey after viewing the video in its entirety.  Encouraged patient/family to read PAT general education handouts thoroughly and notify PAT staff with any questions or concerns. Patient verbalized understanding of all information and priority content.    Patient to apply Chlorhexadine wipes  to surgical area (as instructed) the night before procedure and the AM of procedure. Wipes provided.    Patient instructed to drink 20 ounces of Gatorade and it needs to be completed 1 hour (for Main OR patients) or 2 hours (scheduled  section & [facility] patients) before  given arrival time for procedure (NO RED Gatorade)    Patient verbalized understanding.    Lab work done 12-2-2022 on chart.

## 2022-12-08 LAB — MRSA SPEC QL CULT: NORMAL

## 2022-12-12 ENCOUNTER — ANESTHESIA EVENT (OUTPATIENT)
Dept: PERIOP | Facility: HOSPITAL | Age: 52
End: 2022-12-12

## 2022-12-12 ENCOUNTER — TELEPHONE (OUTPATIENT)
Dept: NEUROSURGERY | Facility: CLINIC | Age: 52
End: 2022-12-12

## 2022-12-12 RX ORDER — SODIUM CHLORIDE 0.9 % (FLUSH) 0.9 %
10 SYRINGE (ML) INJECTION AS NEEDED
Status: CANCELLED | OUTPATIENT
Start: 2022-12-12

## 2022-12-12 RX ORDER — FAMOTIDINE 10 MG/ML
20 INJECTION, SOLUTION INTRAVENOUS ONCE
Status: CANCELLED | OUTPATIENT
Start: 2022-12-12 | End: 2022-12-12

## 2022-12-12 RX ORDER — SODIUM CHLORIDE 0.9 % (FLUSH) 0.9 %
10 SYRINGE (ML) INJECTION EVERY 12 HOURS SCHEDULED
Status: CANCELLED | OUTPATIENT
Start: 2022-12-12

## 2022-12-12 RX ORDER — FAMOTIDINE 20 MG/1
20 TABLET, FILM COATED ORAL ONCE
Status: CANCELLED | OUTPATIENT
Start: 2022-12-12 | End: 2022-12-12

## 2022-12-12 RX ORDER — SODIUM CHLORIDE 9 MG/ML
40 INJECTION, SOLUTION INTRAVENOUS AS NEEDED
Status: CANCELLED | OUTPATIENT
Start: 2022-12-12

## 2022-12-12 NOTE — TELEPHONE ENCOUNTER
Provider:  Mina  Surgery/Procedure:  CERVICAL DISCECTOMY ANTERIOR WITH FUSION c5-6  Surgery/Procedure Date: 12-12-22  Last visit:   10-17-22  Next visit: TBD     Reason for call:  Patient called and wanted to know about her restrictions, patient stated that no one had discussed this with her.    I have called and left a VM letting her know that she will not be able to drive for 3 weeks, no lifting, bending twisting.  Keep the bandage on for 5 days and when she comes back for her post op they will discuss more at that time.

## 2022-12-13 ENCOUNTER — ANESTHESIA (OUTPATIENT)
Dept: PERIOP | Facility: HOSPITAL | Age: 52
End: 2022-12-13

## 2022-12-13 ENCOUNTER — HOSPITAL ENCOUNTER (OUTPATIENT)
Facility: HOSPITAL | Age: 52
Setting detail: SURGERY ADMIT
Discharge: HOME | End: 2022-12-13
Attending: NEUROLOGICAL SURGERY | Admitting: NEUROLOGICAL SURGERY

## 2022-12-13 ENCOUNTER — APPOINTMENT (OUTPATIENT)
Dept: GENERAL RADIOLOGY | Facility: HOSPITAL | Age: 52
End: 2022-12-13

## 2022-12-13 VITALS
OXYGEN SATURATION: 98 % | SYSTOLIC BLOOD PRESSURE: 131 MMHG | TEMPERATURE: 97.7 F | RESPIRATION RATE: 16 BRPM | HEIGHT: 66 IN | BODY MASS INDEX: 24.91 KG/M2 | DIASTOLIC BLOOD PRESSURE: 83 MMHG | HEART RATE: 79 BPM | WEIGHT: 155 LBS

## 2022-12-13 DIAGNOSIS — M54.12 CERVICAL RADICULOPATHY AT C6: Primary | ICD-10-CM

## 2022-12-13 LAB
B-HCG UR QL: NEGATIVE
EXPIRATION DATE: NORMAL
GLUCOSE BLDC GLUCOMTR-MCNC: 103 MG/DL (ref 70–130)
INTERNAL NEGATIVE CONTROL: NORMAL
INTERNAL POSITIVE CONTROL: NORMAL
Lab: NORMAL

## 2022-12-13 PROCEDURE — 81025 URINE PREGNANCY TEST: CPT | Performed by: NEUROLOGICAL SURGERY

## 2022-12-13 PROCEDURE — 22853 INSJ BIOMECHANICAL DEVICE: CPT | Performed by: NEUROLOGICAL SURGERY

## 2022-12-13 PROCEDURE — 25010000002 CEFAZOLIN IN DEXTROSE 2-4 GM/100ML-% SOLUTION: Performed by: PHYSICIAN ASSISTANT

## 2022-12-13 PROCEDURE — 76000 FLUOROSCOPY <1 HR PHYS/QHP: CPT

## 2022-12-13 PROCEDURE — 25010000002 PROPOFOL 10 MG/ML EMULSION: Performed by: NURSE ANESTHETIST, CERTIFIED REGISTERED

## 2022-12-13 PROCEDURE — 22551 ARTHRD ANT NTRBDY CERVICAL: CPT | Performed by: PHYSICIAN ASSISTANT

## 2022-12-13 PROCEDURE — 25010000002 FENTANYL CITRATE (PF) 100 MCG/2ML SOLUTION: Performed by: NURSE ANESTHETIST, CERTIFIED REGISTERED

## 2022-12-13 PROCEDURE — 22845 INSERT SPINE FIXATION DEVICE: CPT | Performed by: NEUROLOGICAL SURGERY

## 2022-12-13 PROCEDURE — 25010000002 DEXAMETHASONE PER 1 MG: Performed by: NURSE ANESTHETIST, CERTIFIED REGISTERED

## 2022-12-13 PROCEDURE — C1713 ANCHOR/SCREW BN/BN,TIS/BN: HCPCS | Performed by: NEUROLOGICAL SURGERY

## 2022-12-13 PROCEDURE — 25010000002 ONDANSETRON PER 1 MG

## 2022-12-13 PROCEDURE — 22853 INSJ BIOMECHANICAL DEVICE: CPT | Performed by: PHYSICIAN ASSISTANT

## 2022-12-13 PROCEDURE — 22551 ARTHRD ANT NTRBDY CERVICAL: CPT | Performed by: NEUROLOGICAL SURGERY

## 2022-12-13 PROCEDURE — 25010000002 DROPERIDOL PER 5 MG

## 2022-12-13 PROCEDURE — 25010000002 FENTANYL CITRATE (PF) 50 MCG/ML SOLUTION

## 2022-12-13 PROCEDURE — 25010000002 ONDANSETRON PER 1 MG: Performed by: NURSE ANESTHETIST, CERTIFIED REGISTERED

## 2022-12-13 PROCEDURE — 22845 INSERT SPINE FIXATION DEVICE: CPT | Performed by: PHYSICIAN ASSISTANT

## 2022-12-13 PROCEDURE — 82962 GLUCOSE BLOOD TEST: CPT

## 2022-12-13 DEVICE — CLIP LIGAT VASC HORIZON TI SM YEL 6CT: Type: IMPLANTABLE DEVICE | Site: SPINE CERVICAL | Status: FUNCTIONAL

## 2022-12-13 DEVICE — PLT SKYLINE 1LEVEL 12MM: Type: IMPLANTABLE DEVICE | Site: SPINE CERVICAL | Status: FUNCTIONAL

## 2022-12-13 DEVICE — FLOSEAL HEMOSTATIC MATRIX, 10ML
Type: IMPLANTABLE DEVICE | Site: SPINE CERVICAL | Status: FUNCTIONAL
Brand: FLOSEAL HEMOSTATIC MATRIX

## 2022-12-13 DEVICE — CLIP LIGAT VASC HORIZON TI MD BLU 6CT: Type: IMPLANTABLE DEVICE | Site: SPINE CERVICAL | Status: FUNCTIONAL

## 2022-12-13 DEVICE — HEMOST ABS SURGIFOAM SZ100 8X12 10MM: Type: IMPLANTABLE DEVICE | Site: SPINE CERVICAL | Status: FUNCTIONAL

## 2022-12-13 DEVICE — BENGAL SYSTEM STANDARD IMPLANT 6MM, 7 DEGREES
Type: IMPLANTABLE DEVICE | Site: SPINE CERVICAL | Status: FUNCTIONAL
Brand: BENGAL

## 2022-12-13 DEVICE — SCRW SKYLINE VAR SD 14MM: Type: IMPLANTABLE DEVICE | Site: SPINE CERVICAL | Status: FUNCTIONAL

## 2022-12-13 DEVICE — ALLOGRFT BONE VIVIGEN CELLUAR MATRX FORMABLE 1CC: Type: IMPLANTABLE DEVICE | Site: SPINE CERVICAL | Status: FUNCTIONAL

## 2022-12-13 DEVICE — SURGICAL INSTRUMENTS COMPRESSION PIN 14MM: Type: IMPLANTABLE DEVICE | Site: SPINE CERVICAL | Status: FUNCTIONAL

## 2022-12-13 RX ORDER — FENTANYL CITRATE 50 UG/ML
50 INJECTION, SOLUTION INTRAMUSCULAR; INTRAVENOUS
Status: DISCONTINUED | OUTPATIENT
Start: 2022-12-13 | End: 2022-12-13 | Stop reason: HOSPADM

## 2022-12-13 RX ORDER — FENTANYL CITRATE 50 UG/ML
INJECTION, SOLUTION INTRAMUSCULAR; INTRAVENOUS
Status: COMPLETED
Start: 2022-12-13 | End: 2022-12-13

## 2022-12-13 RX ORDER — DROPERIDOL 2.5 MG/ML
0.62 INJECTION, SOLUTION INTRAMUSCULAR; INTRAVENOUS ONCE
Status: COMPLETED | OUTPATIENT
Start: 2022-12-13 | End: 2022-12-13

## 2022-12-13 RX ORDER — HYDROMORPHONE HYDROCHLORIDE 1 MG/ML
0.5 INJECTION, SOLUTION INTRAMUSCULAR; INTRAVENOUS; SUBCUTANEOUS
Status: DISCONTINUED | OUTPATIENT
Start: 2022-12-13 | End: 2022-12-13 | Stop reason: HOSPADM

## 2022-12-13 RX ORDER — MAGNESIUM HYDROXIDE 1200 MG/15ML
LIQUID ORAL AS NEEDED
Status: DISCONTINUED | OUTPATIENT
Start: 2022-12-13 | End: 2022-12-13 | Stop reason: HOSPADM

## 2022-12-13 RX ORDER — HYDROCODONE BITARTRATE AND ACETAMINOPHEN 7.5; 325 MG/1; MG/1
TABLET ORAL
Status: COMPLETED
Start: 2022-12-13 | End: 2022-12-13

## 2022-12-13 RX ORDER — DEXAMETHASONE SODIUM PHOSPHATE 4 MG/ML
INJECTION, SOLUTION INTRA-ARTICULAR; INTRALESIONAL; INTRAMUSCULAR; INTRAVENOUS; SOFT TISSUE AS NEEDED
Status: DISCONTINUED | OUTPATIENT
Start: 2022-12-13 | End: 2022-12-13 | Stop reason: SURG

## 2022-12-13 RX ORDER — IBUPROFEN 800 MG/1
800 TABLET ORAL ONCE
Status: COMPLETED | OUTPATIENT
Start: 2022-12-13 | End: 2022-12-13

## 2022-12-13 RX ORDER — ACETAMINOPHEN 325 MG/1
650 TABLET ORAL ONCE
Status: COMPLETED | OUTPATIENT
Start: 2022-12-13 | End: 2022-12-13

## 2022-12-13 RX ORDER — LIDOCAINE HYDROCHLORIDE 10 MG/ML
INJECTION, SOLUTION EPIDURAL; INFILTRATION; INTRACAUDAL; PERINEURAL AS NEEDED
Status: DISCONTINUED | OUTPATIENT
Start: 2022-12-13 | End: 2022-12-13 | Stop reason: SURG

## 2022-12-13 RX ORDER — ONDANSETRON 2 MG/ML
INJECTION INTRAMUSCULAR; INTRAVENOUS AS NEEDED
Status: DISCONTINUED | OUTPATIENT
Start: 2022-12-13 | End: 2022-12-13 | Stop reason: SURG

## 2022-12-13 RX ORDER — HYDROCODONE BITARTRATE AND ACETAMINOPHEN 7.5; 325 MG/1; MG/1
1 TABLET ORAL ONCE
Status: COMPLETED | OUTPATIENT
Start: 2022-12-13 | End: 2022-12-13

## 2022-12-13 RX ORDER — DROPERIDOL 2.5 MG/ML
INJECTION, SOLUTION INTRAMUSCULAR; INTRAVENOUS
Status: COMPLETED
Start: 2022-12-13 | End: 2022-12-13

## 2022-12-13 RX ORDER — SODIUM CHLORIDE, SODIUM LACTATE, POTASSIUM CHLORIDE, CALCIUM CHLORIDE 600; 310; 30; 20 MG/100ML; MG/100ML; MG/100ML; MG/100ML
9 INJECTION, SOLUTION INTRAVENOUS CONTINUOUS
Status: DISCONTINUED | OUTPATIENT
Start: 2022-12-13 | End: 2022-12-13 | Stop reason: HOSPADM

## 2022-12-13 RX ORDER — FAMOTIDINE 20 MG/1
20 TABLET, FILM COATED ORAL
Status: COMPLETED | OUTPATIENT
Start: 2022-12-13 | End: 2022-12-13

## 2022-12-13 RX ORDER — METHOCARBAMOL 750 MG/1
750 TABLET, FILM COATED ORAL 3 TIMES DAILY PRN
Qty: 90 TABLET | Refills: 0 | Status: SHIPPED | OUTPATIENT
Start: 2022-12-13

## 2022-12-13 RX ORDER — MIDAZOLAM HYDROCHLORIDE 1 MG/ML
1 INJECTION INTRAMUSCULAR; INTRAVENOUS
Status: DISCONTINUED | OUTPATIENT
Start: 2022-12-13 | End: 2022-12-13 | Stop reason: HOSPADM

## 2022-12-13 RX ORDER — HYDROCODONE BITARTRATE AND ACETAMINOPHEN 5; 325 MG/1; MG/1
1-2 TABLET ORAL EVERY 4 HOURS PRN
Qty: 30 TABLET | Refills: 0 | Status: SHIPPED | OUTPATIENT
Start: 2022-12-13 | End: 2023-02-03

## 2022-12-13 RX ORDER — LIDOCAINE HYDROCHLORIDE 10 MG/ML
0.5 INJECTION, SOLUTION EPIDURAL; INFILTRATION; INTRACAUDAL; PERINEURAL ONCE AS NEEDED
Status: COMPLETED | OUTPATIENT
Start: 2022-12-13 | End: 2022-12-13

## 2022-12-13 RX ORDER — ONDANSETRON 2 MG/ML
4 INJECTION INTRAMUSCULAR; INTRAVENOUS ONCE AS NEEDED
Status: COMPLETED | OUTPATIENT
Start: 2022-12-13 | End: 2022-12-13

## 2022-12-13 RX ORDER — PROPOFOL 10 MG/ML
VIAL (ML) INTRAVENOUS AS NEEDED
Status: DISCONTINUED | OUTPATIENT
Start: 2022-12-13 | End: 2022-12-13 | Stop reason: SURG

## 2022-12-13 RX ORDER — FENTANYL CITRATE 50 UG/ML
INJECTION, SOLUTION INTRAMUSCULAR; INTRAVENOUS AS NEEDED
Status: DISCONTINUED | OUTPATIENT
Start: 2022-12-13 | End: 2022-12-13 | Stop reason: SURG

## 2022-12-13 RX ORDER — ONDANSETRON 2 MG/ML
INJECTION INTRAMUSCULAR; INTRAVENOUS
Status: COMPLETED
Start: 2022-12-13 | End: 2022-12-13

## 2022-12-13 RX ORDER — CEFAZOLIN SODIUM 2 G/100ML
2 INJECTION, SOLUTION INTRAVENOUS ONCE
Status: COMPLETED | OUTPATIENT
Start: 2022-12-13 | End: 2022-12-13

## 2022-12-13 RX ORDER — LIDOCAINE HYDROCHLORIDE AND EPINEPHRINE 5; 5 MG/ML; UG/ML
INJECTION, SOLUTION INFILTRATION; PERINEURAL AS NEEDED
Status: DISCONTINUED | OUTPATIENT
Start: 2022-12-13 | End: 2022-12-13 | Stop reason: HOSPADM

## 2022-12-13 RX ORDER — HYDROCODONE BITARTRATE AND ACETAMINOPHEN 7.5; 325 MG/1; MG/1
1 TABLET ORAL ONCE AS NEEDED
Status: DISCONTINUED | OUTPATIENT
Start: 2022-12-13 | End: 2022-12-13 | Stop reason: HOSPADM

## 2022-12-13 RX ORDER — ROCURONIUM BROMIDE 10 MG/ML
INJECTION, SOLUTION INTRAVENOUS AS NEEDED
Status: DISCONTINUED | OUTPATIENT
Start: 2022-12-13 | End: 2022-12-13 | Stop reason: SURG

## 2022-12-13 RX ORDER — PHENYLEPHRINE HCL IN 0.9% NACL 1 MG/10 ML
SYRINGE (ML) INTRAVENOUS AS NEEDED
Status: DISCONTINUED | OUTPATIENT
Start: 2022-12-13 | End: 2022-12-13 | Stop reason: SURG

## 2022-12-13 RX ADMIN — Medication 100 MCG: at 11:03

## 2022-12-13 RX ADMIN — CEFAZOLIN SODIUM 2 G: 2 INJECTION, SOLUTION INTRAVENOUS at 10:33

## 2022-12-13 RX ADMIN — DEXAMETHASONE SODIUM PHOSPHATE 8 MG: 4 INJECTION, SOLUTION INTRAMUSCULAR; INTRAVENOUS at 10:42

## 2022-12-13 RX ADMIN — FENTANYL CITRATE 50 MCG: 50 INJECTION, SOLUTION INTRAMUSCULAR; INTRAVENOUS at 13:21

## 2022-12-13 RX ADMIN — ONDANSETRON 4 MG: 2 INJECTION INTRAMUSCULAR; INTRAVENOUS at 12:22

## 2022-12-13 RX ADMIN — SODIUM CHLORIDE, POTASSIUM CHLORIDE, SODIUM LACTATE AND CALCIUM CHLORIDE 9 ML/HR: 600; 310; 30; 20 INJECTION, SOLUTION INTRAVENOUS at 08:33

## 2022-12-13 RX ADMIN — IBUPROFEN 800 MG: 800 TABLET, FILM COATED ORAL at 08:33

## 2022-12-13 RX ADMIN — ONDANSETRON 4 MG: 2 INJECTION INTRAMUSCULAR; INTRAVENOUS at 11:20

## 2022-12-13 RX ADMIN — LIDOCAINE HYDROCHLORIDE 50 MG: 10 INJECTION, SOLUTION EPIDURAL; INFILTRATION; INTRACAUDAL; PERINEURAL at 10:33

## 2022-12-13 RX ADMIN — HYDROCODONE BITARTRATE AND ACETAMINOPHEN 1 TABLET: 7.5; 325 TABLET ORAL at 13:04

## 2022-12-13 RX ADMIN — PROPOFOL 20 MG: 10 INJECTION, EMULSION INTRAVENOUS at 11:43

## 2022-12-13 RX ADMIN — PROPOFOL 300 MG: 10 INJECTION, EMULSION INTRAVENOUS at 10:33

## 2022-12-13 RX ADMIN — Medication 100 MCG: at 11:33

## 2022-12-13 RX ADMIN — FAMOTIDINE 20 MG: 20 TABLET, FILM COATED ORAL at 08:33

## 2022-12-13 RX ADMIN — FENTANYL CITRATE 50 MCG: 50 INJECTION, SOLUTION INTRAMUSCULAR; INTRAVENOUS at 12:31

## 2022-12-13 RX ADMIN — DROPERIDOL 0.62 MG: 2.5 INJECTION, SOLUTION INTRAMUSCULAR; INTRAVENOUS at 13:47

## 2022-12-13 RX ADMIN — LIDOCAINE HYDROCHLORIDE 0.5 ML: 10 INJECTION, SOLUTION EPIDURAL; INFILTRATION; INTRACAUDAL; PERINEURAL at 08:33

## 2022-12-13 RX ADMIN — Medication 100 MCG: at 11:20

## 2022-12-13 RX ADMIN — ROCURONIUM BROMIDE 70 MG: 50 INJECTION, SOLUTION INTRAVENOUS at 10:33

## 2022-12-13 RX ADMIN — ACETAMINOPHEN 650 MG: 325 TABLET ORAL at 08:33

## 2022-12-13 RX ADMIN — HYDROCODONE BITARTRATE AND ACETAMINOPHEN 1 TABLET: 7.5; 325 TABLET ORAL at 08:33

## 2022-12-13 RX ADMIN — FENTANYL CITRATE 100 MCG: 50 INJECTION INTRAMUSCULAR; INTRAVENOUS at 10:33

## 2022-12-13 RX ADMIN — SUGAMMADEX 500 MG: 100 INJECTION, SOLUTION INTRAVENOUS at 11:40

## 2022-12-13 NOTE — H&P
Pre-Op H&P  Francesca Manning  [id number]  1970      Chief complaint: neck pain      Subjective:  Patient is a 52 y.o.female presents for scheduled surgery by Dr. Enriquez. She anticipates a CERVICAL DISCECTOMY ANTERIOR WITH FUSION c5-6 today. Her neck has been painful for over 2 years. The pain radiates down the RUE. He has numbness and tingling right arm. She tried PT without benefit. MRI showed right-sided C5-6 disc osteophyte complex with slight cord compression with a right eccentric disc with an intraforaminal component of disc material.      Review of Systems:  Constitutional-- No fever, chills or sweats. No fatigue.  CV-- No chest pain, palpitation or syncope. +HLD  Resp-- No SOB, cough, hemoptysis  Skin--No rashes or lesions      Allergies:   Allergies   Allergen Reactions   • Sulfa Antibiotics Rash         Home Meds:  Medications Prior to Admission   Medication Sig Dispense Refill Last Dose   • buPROPion XL (WELLBUTRIN XL) 300 MG 24 hr tablet Take 1 tablet by mouth Daily. 90 tablet 3    • cholecalciferol (VITAMIN D3) 250 MCG (98958 UT) capsule Take  by mouth Daily. 1000 IU  5    • Cyanocobalamin (B-12 PO) Take 1,000 mcg by mouth Daily.      • famotidine (PEPCID) 10 MG tablet Take 1 tablet by mouth 2 (Two) Times a Day. 180 tablet 3    • famotidine (PEPCID) 20 MG tablet Take 20 mg by mouth Every Other Day.      • ferrous sulfate 325 (65 FE) MG tablet Take 325 mg by mouth Daily With Breakfast.      • levonorgestrel-ethinyl estradiol (SEASONALE) 0.15-0.03 MG per tablet TAKE ONE TABLET BY MOUTH EVERY DAY (Patient taking differently: Take 1 tablet by mouth Daily.) 91 tablet 0    • spironolactone (Aldactone) 25 MG tablet Take 1 tablet by mouth Daily. 90 tablet 3    • SUMAtriptan (IMITREX) 50 MG tablet Take  by mouth.      • Vascepa 1 g capsule capsule Take 2 g by mouth 2 (Two) Times a Day With Meals. (Patient taking differently: Take 1 g by mouth Daily.) 120 capsule 0          PMH:   Past Medical History:  "  Diagnosis Date   • GERD (gastroesophageal reflux disease)    • Gestational diabetes    • Hypercholesteremia    • Migraines      PSH:    Past Surgical History:   Procedure Laterality Date   •  SECTION  2010    Boy: Hitesh   •  SECTION PRIMARY  2002    Boy: [place]   • CYSTOSCOPY      X 2   • EYE SURGERY      RETINAL DETACHMENT X3   • HYSTEROSCOPY W/ POLYPECTOMY  2022    with myosure lite, D&C       Immunization History:  Influenza: No  Pneumococcal: No  Tetanus: UTD  Covid x3:     Social History:   Tobacco:   Social History     Tobacco Use   Smoking Status Never   Smokeless Tobacco Never      Alcohol:     Social History     Substance and Sexual Activity   Alcohol Use No         Physical Exam:/83 (BP Location: Right arm, Patient Position: Lying)   Pulse 103   Temp 97.3 °F (36.3 °C) (Temporal)   Resp 16   Ht 167.6 cm (66\")   Wt 70.3 kg (155 lb)   SpO2 97%   BMI 25.02 kg/m²       General Appearance:    Alert, cooperative, no distress, appears stated age   Head:    Normocephalic, without obvious abnormality, atraumatic   Lungs:     Clear to auscultation bilaterally, respirations unlabored    Heart:   Regular rate and rhythm, S1 and S2 normal    Abdomen:    Soft without tenderness   Extremities:   Extremities normal, atraumatic, no cyanosis or edema   Skin:   Skin color, texture, turgor normal, no rashes or lesions   Neurologic:   Grossly intact     Results Review:     LABS:  Lab Results   Component Value Date    WBC 8.1 2022    HGB 15.0 2022    HCT 45.1 2022    MCV 93 2022     2022    NEUTROABS 5.3 2022    GLUCOSE 72 2022    BUN 11 2022    CREATININE 0.90 2022     2022    K 4.2 2022    CL 99 2022    CO2 21 2022    CALCIUM 9.2 2022    ALBUMIN 4.6 2022    AST 10 2022    ALT 20 2022    BILITOT 0.7 2022       RADIOLOGY:  Imaging Results (Last 72 Hours)     " ** No results found for the last 72 hours. **          I reviewed the patient's new clinical results.    Cancer Staging (if applicable)  Cancer Patient: __ yes __no __unknown; If yes, clinical stage T:__ N:__M:__, stage group or __N/A      Impression: cervical radiculopathy at C-6      Plan: CERVICAL DISCECTOMY ANTERIOR WITH FUSION c5-6      TERE Parrish   12/13/2022   08:24 EST

## 2022-12-13 NOTE — OP NOTE
Preoperative diagnosis cervical disc disease  C5-6 degenerative disc disease bilateral foraminal stenosis  Postoperative diagnosis same    Procedures  1.  Anterior cervical discectomy C5-6  2.  Anterior arthrodesis C5-6  3.  Placement of the bangle 6 mm packed with demineralized bone matrix    4.  Anterior segmental instrumentation using Depuy skyline plate and screws 14 mm  5.  Fluoroscopy to confirm level intraoperative    Gen. endotracheal anesthesia    Surgeon Giovany Enriquez M.D.  Assistant Chan Garay my physician's assistant was present and personally scrubbed the entirety of the procedure, they assisted suctioning, retraction, approach, and closure of the case    Minimal blood loss 10 cc  Complications none none  Procedure in detail    After formal written consent was obtained explaining risks and benefits of procedure patient was taken to operating room.  All bony prominences and genitalia were padded to prevent neurologic injury.  Preoperative antimicrobial prophylaxis was given per protocol.  Patient was placed in neutral C-spine and prepped and draped in usual sterile manner.  Anterior incision was made after local infiltration per record.    Dissection was carried down to skin and subcutaneous tissues.  The carotid artery and tracheoesophageal bundle were gently dissected and mobilized respectively to allow access to the anterior longitudinal spine and ligament.  At this point in time soft tissues were cleared using blunt dissection fluoroscopic guidance identified the correct levels.  This was at C5-6 at this point in time a thorough discectomy was performed after placement of distraction pins.  The posterior ligament was identified after thorough discectomy and drilled down using high-speed bur the posterior longitudinal ligament was removed and the disc space was removed and explored to ensure adequate decompression of the nerves and nerve roots respectively.  Fluoroscopic guidance confirmed  correct levels and decompression.  There was a lot of disc osteophyte complex that was removed both intraforaminal and some subligamentous disc fragment noted at the right particular area    At this point time the anterior bangle cage with demineralized bone matrix was placed after being sized, and the anterior plating system was placed and torqued to appropriate tightness.  Arthrodesis was completed fluoroscopic imaging identified again the correct level meticulous hemostasis maintained and the skin was closed in layers.    I performed the entire surgery till the closure of the skin.

## 2022-12-13 NOTE — ANESTHESIA PREPROCEDURE EVALUATION
Anesthesia Evaluation     Patient summary reviewed and Nursing notes reviewed                Airway   Mallampati: III  TM distance: >3 FB  Neck ROM: limited  Possible difficult intubation  Dental - normal exam     Pulmonary - negative pulmonary ROS and normal exam   Cardiovascular - normal exam    (+) hyperlipidemia,       Neuro/Psych- negative ROS  GI/Hepatic/Renal/Endo    (+)  GERD,  diabetes mellitus,     Musculoskeletal (-) negative ROS    Abdominal  - normal exam    Bowel sounds: normal.   Substance History - negative use     OB/GYN negative ob/gyn ROS         Other                      Anesthesia Plan    ASA 3     general     ([facility])  intravenous induction     Anesthetic plan, risks, benefits, and alternatives have been provided, discussed and informed consent has been obtained with: patient.    Plan discussed with CRNA.        CODE STATUS:

## 2022-12-13 NOTE — ANESTHESIA PROCEDURE NOTES
Airway  Urgency: elective    Date/Time: 12/13/2022 10:37 AM  Airway not difficult    General Information and Staff    Patient location during procedure: OR  CRNA/CAA: Henri Workman, CRNA    Indications and Patient Condition  Indications for airway management: airway protection    Preoxygenated: yes  MILS not maintained throughout  Mask difficulty assessment: 1 - vent by mask    Final Airway Details  Final airway type: endotracheal airway      Successful airway: ETT  Cuffed: yes   Successful intubation technique: video laryngoscopy  Facilitating devices/methods: Bougie  Endotracheal tube insertion site: oral  Blade: Beauchamp  Blade size: 3  ETT size (mm): 7.0  Cormack-Lehane Classification: grade I - full view of glottis  Placement verified by: chest auscultation and capnometry   Measured from: lips  ETT/EBT  to lips (cm): 20  Number of attempts at approach: 1  Assessment: lips, teeth, and gum same as pre-op and atraumatic intubation    Additional Comments  Negative epigastric sounds, Breath sound equal bilaterally with symmetric chest rise and fall

## 2022-12-15 ENCOUNTER — TELEPHONE (OUTPATIENT)
Dept: NEUROSURGERY | Facility: CLINIC | Age: 52
End: 2022-12-15

## 2022-12-15 DIAGNOSIS — M54.12 CERVICAL RADICULOPATHY: Primary | ICD-10-CM

## 2022-12-15 DIAGNOSIS — R11.2 NAUSEA AND VOMITING, UNSPECIFIED VOMITING TYPE: ICD-10-CM

## 2022-12-15 RX ORDER — ONDANSETRON 4 MG/1
4 TABLET, ORALLY DISINTEGRATING ORAL EVERY 8 HOURS PRN
Qty: 15 TABLET | Refills: 1 | Status: SHIPPED | OUTPATIENT
Start: 2022-12-15

## 2022-12-15 NOTE — TELEPHONE ENCOUNTER
Provider:  Mina  Surgery/Procedure:  ACDF C5-6  Surgery/Procedure Date:  12/13/22  Last visit:   10/17/22  Next visit: 1/4/23     Reason for call: Patient called about nausea and vomiting, has been sick to her stomach since coming home from surgery and unable to keep anything down. Spoke with Torsten Montoya PA-C and will send in prescription for Zofran.

## 2022-12-29 RX ORDER — LEVONORGESTREL AND ETHINYL ESTRADIOL 0.15-0.03
KIT ORAL
Qty: 91 TABLET | Refills: 0 | Status: SHIPPED | OUTPATIENT
Start: 2022-12-29 | End: 2023-03-29

## 2022-12-30 NOTE — TELEPHONE ENCOUNTER
Let her know I am sending in a 3-month supply.  At her age it may be worth her having a discussion with Dr. Narayanan about whether this can be stopped and check to see if she is menopausal

## 2023-01-03 ENCOUNTER — TELEPHONE (OUTPATIENT)
Dept: NEUROSURGERY | Facility: CLINIC | Age: 53
End: 2023-01-03
Payer: COMMERCIAL

## 2023-01-03 DIAGNOSIS — M54.12 CERVICAL RADICULOPATHY: Primary | ICD-10-CM

## 2023-01-03 NOTE — TELEPHONE ENCOUNTER
Provider: ALPHONSE  Caller: PATIENT  Pharmacy: [id number] - [place], [place] - [address] AT [address] - [phone]  - [phone]   [phone]  Phone Number: [phone]  Reason for Call: PATIENT CALLED, PATIENT HAD SX 12/13/22 CERVICAL FUSION, PATIENT STATES RIGHT ARM STARTED ACHING, TINGLING IN MIDDLE 2 FINGERS, TEMPERATURE SWINGS SHE GETS HOT AND THEN COLD. PATIENT STATES LAYING DOWN HELPED AT FIRST, SINCE LAST NIGHT 01/02/23, LAYING DOWN PROVIDED NO RELIEF  When was the patient last seen: SX 12/13/22, POST OP SCHEDULED 01/04/23  When did it start: 12/30/22  Where is it located: RIGHT ARM STARTED ACHING, TINGLING IN MIDDLE 2 FINGERS  Characteristics of symptom/severity: 4/10  Timing- Is it constant or intermittent: CONSTANT  What makes it worse: STANDING UP  What makes it better: PATIENT STATES LAYING DOWN HELPED AT FIRST, SINCE LAST NIGHT LAYING DOWN PROVIDED NO RELIEF      PLEASE CALL PATIENT TO ADVISE.    THANK YOU

## 2023-01-03 NOTE — TELEPHONE ENCOUNTER
Spoke with Ms. Manning. The pain has gradually gotten a little worse over the past several days. She's having right arm achy pains that start below the shoulder and radiate down the arm, with some numbness/tingling in her fingers. There's no swelling in her arm, incision also looks good, and her arm is not tender to touch. I've asked her to come to her appointment early and to have an x-ray of her neck done prior to the appointment, and advised her to use ice or heat on her arm/shoulder to see if that brings relief.

## 2023-01-04 ENCOUNTER — HOSPITAL ENCOUNTER (OUTPATIENT)
Dept: GENERAL RADIOLOGY | Facility: HOSPITAL | Age: 53
Discharge: HOME | End: 2023-01-04
Admitting: PHYSICIAN ASSISTANT
Payer: COMMERCIAL

## 2023-01-04 ENCOUNTER — OFFICE VISIT (OUTPATIENT)
Dept: NEUROSURGERY | Facility: CLINIC | Age: 53
End: 2023-01-04
Payer: COMMERCIAL

## 2023-01-04 VITALS
SYSTOLIC BLOOD PRESSURE: 120 MMHG | BODY MASS INDEX: 23.43 KG/M2 | TEMPERATURE: 96.6 F | DIASTOLIC BLOOD PRESSURE: 90 MMHG | WEIGHT: 145.8 LBS | HEIGHT: 66 IN

## 2023-01-04 DIAGNOSIS — M50.30 BULGING OF CERVICAL INTERVERTEBRAL DISC: ICD-10-CM

## 2023-01-04 DIAGNOSIS — M54.12 CERVICAL RADICULOPATHY AT C6: ICD-10-CM

## 2023-01-04 DIAGNOSIS — M54.2 NECK PAIN, MUSCULOSKELETAL: Primary | ICD-10-CM

## 2023-01-04 DIAGNOSIS — M54.12 CERVICAL RADICULOPATHY: ICD-10-CM

## 2023-01-04 PROCEDURE — 99024 POSTOP FOLLOW-UP VISIT: CPT

## 2023-01-04 PROCEDURE — 72040 X-RAY EXAM NECK SPINE 2-3 VW: CPT

## 2023-01-04 NOTE — PROGRESS NOTES
Answers for HPI/ROS submitted by the patient on 2022  Please describe your symptoms.: Follow up from surgery , Slight tingle in end of ring finger  Have you had these symptoms before?: Yes  How long have you been having these symptoms?: 1-2 weeks  What is the primary reason for your visit?: Other         Office Visit      Date: 2023  Patient Name: Francesca Manning  : 1970   MRN: [id number]     Answers for HPI/ROS submitted by the patient on 2022  Please describe your symptoms.: Follow up from surgery , Slight tingle in end of ring finger  Have you had these symptoms before?: Yes  How long have you been having these symptoms?: 1-2 weeks  What is the primary reason for your visit?: Other      Chief Complaint:    Chief Complaint   Patient presents with   • Post-op     Cervical discectomy         History of Present Illness: Francesca Manning is a 52 y.o. female who is well-known to Dr. Enriquez's service. 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  She underwent physical there for frozen shoulder that resolved but her pain is still ever present that affects her sleeping greatly affects her quality of life.  She was seen in consultation by Dr. Enriquez on 10/17/2022 and was found to be a neurosurgical candidate. Here today for postoperative follow-up from her uncomplicated cervical discectomy anterior with fusion for C5-C6 performed by Dr. Enriquez on 2022.  Patient states she is doing well today, however she is nervous about her current status postoperatively.  Patient complains of some mild swallowing difficulties, however it is not as bad as it was several weeks ago.  Patient also complains of some occasional  \"right arm aching pains\" patient states these pain start below her shoulder and will radiate into the arms again describes them as aching in nature.  Patient states they are intermittent and that she has good days and bad days.  However, she states it is nowhere near the pain and discomfort she experienced preoperatively.  Patient still complains of some occasional numbness/tingling associated in her fourth and fifth digits.  Patient denies any fever, chills or any signs of postoperative infection, denies any drainage or erythema around the incision site.  Patient states she has had a real bad couple of days as far as the aching pain goes, which she called our office about and a cervical spine x-ray was ordered for review today.    Subjective   Review of Systems:  Review of Systems   Constitutional: Negative for activity change, appetite change, chills, diaphoresis, fatigue, fever and unexpected weight change.   HENT: Positive for sore throat and trouble swallowing. Negative for congestion, dental problem, drooling, ear discharge, ear pain, facial swelling, hearing loss, mouth sores, nosebleeds, postnasal drip, rhinorrhea, sinus pressure, sinus pain, sneezing, tinnitus and voice change.    Eyes: Negative for photophobia, pain, discharge, redness, itching and visual disturbance.   Respiratory: Negative for apnea, cough, choking, chest tightness, shortness of breath, wheezing and stridor.    Cardiovascular: Negative for chest pain, palpitations and leg swelling.   Gastrointestinal: Positive for nausea. Negative for abdominal distention, abdominal pain, anal bleeding, blood in stool, constipation, diarrhea, rectal pain and vomiting.   Endocrine: Negative for cold intolerance, heat intolerance, polydipsia, polyphagia and polyuria.   Genitourinary: Negative for decreased urine volume, difficulty urinating, dysuria, enuresis, flank pain, frequency, genital sores, hematuria and urgency.   Musculoskeletal: Positive for  myalgias, neck pain and neck stiffness. Negative for arthralgias, back pain, gait problem and joint swelling.   Skin: Negative for color change, pallor, rash and wound.   Allergic/Immunologic: Negative for environmental allergies, food allergies and immunocompromised state.   Neurological: Positive for numbness (in fingers). Negative for dizziness, tremors, seizures, syncope, facial asymmetry, speech difficulty, weakness, light-headedness and headaches.   Hematological: Negative for adenopathy. Does not bruise/bleed easily.   Psychiatric/Behavioral: Negative for agitation, behavioral problems, confusion, decreased concentration, dysphoric mood, hallucinations, self-injury, sleep disturbance and suicidal ideas. The patient is not nervous/anxious and is not hyperactive.         Past Medical History:  Past Medical History:   Diagnosis Date   • Cervical disc disorder 2021   • Diabetes mellitus (HCC) Gestational   • GERD (gastroesophageal reflux disease)    • Gestational diabetes    • Hypercholesteremia    • Migraines        Past Surgical History:  Past Surgical History:   Procedure Laterality Date   • ANTERIOR CERVICAL DISCECTOMY W/ FUSION N/A 2022    Procedure: CERVICAL DISCECTOMY ANTERIOR WITH FUSION C5-6;  Surgeon: Giovany Enriquez MD;  Location: [facility];  Service: Neurosurgery;  Laterality: N/A;   •  SECTION  2010    Boy: Hitesh   •  SECTION PRIMARY  2002    Boy: [place]   • CYSTOSCOPY      X 2   • EYE SURGERY      RETINAL DETACHMENT X3   • HYSTEROSCOPY W/ POLYPECTOMY  2022    with myosure lite, D&C   • NECK SURGERY  Dec 13, 2022   • SPINAL FUSION  Dec 13, 2022       Medications    Current Outpatient Medications:   •  buPROPion XL (WELLBUTRIN XL) 300 MG 24 hr tablet, Take 1 tablet by mouth Daily., Disp: 90 tablet, Rfl: 3  •  cholecalciferol (VITAMIN D3) 250 MCG (00259 UT) capsule, Take  by mouth Daily. 1000 IU, Disp: , Rfl: 5  •  Cyanocobalamin (B-12 PO), Take 1,000 mcg  by mouth Daily., Disp: , Rfl:   •  famotidine (PEPCID) 10 MG tablet, Take 1 tablet by mouth 2 (Two) Times a Day., Disp: 180 tablet, Rfl: 3  •  famotidine (PEPCID) 20 MG tablet, Take 20 mg by mouth Every Other Day., Disp: , Rfl:   •  ferrous sulfate 325 (65 FE) MG tablet, Take 325 mg by mouth Daily With Breakfast., Disp: , Rfl:   •  levonorgestrel-ethinyl estradiol (SEASONALE) 0.15-0.03 MG per tablet, TAKE ONE TABLET BY MOUTH EVERY DAY, Disp: 91 tablet, Rfl: 0  •  methocarbamol (ROBAXIN) 750 MG tablet, Take 1 tablet by mouth 3 (Three) Times a Day As Needed for Muscle Spasms for up to 90 doses. This medicine will help with back pain from spasm take as needed use caution when starting medicine to ensure it does not cause drowsiness or other side effects, Disp: 90 tablet, Rfl: 0  •  ondansetron ODT (ZOFRAN-ODT) 4 MG disintegrating tablet, Place 1 tablet on the tongue Every 8 (Eight) Hours As Needed for Nausea or Vomiting., Disp: 15 tablet, Rfl: 1  •  spironolactone (Aldactone) 25 MG tablet, Take 1 tablet by mouth Daily., Disp: 90 tablet, Rfl: 3  •  SUMAtriptan (IMITREX) 50 MG tablet, Take  by mouth., Disp: , Rfl:   •  Vascepa 1 g capsule capsule, Take 2 g by mouth 2 (Two) Times a Day With Meals., Disp: 120 capsule, Rfl: 0  •  HYDROcodone-acetaminophen (NORCO) 5-325 MG per tablet, Take 1-2 tablets by mouth Every 4 (Four) Hours As Needed (Pain)., Disp: 30 tablet, Rfl: 0    Allergies:  Allergies   Allergen Reactions   • Sulfa Antibiotics Rash       Social Hx:  Social History     Tobacco Use   • Smoking status: Never   • Smokeless tobacco: Never   Vaping Use   • Vaping Use: Never used   Substance Use Topics   • Alcohol use: No   • Drug use: No       Family Hx:  Family History   Problem Relation Age of Onset   • Cancer Mother         Breast   • Asthma Mother    • Breast cancer Mother 71   • Vision loss Mother    • Cancer Father         Breast   • Arthritis Father    • Breast cancer Father 68   • Diabetes Father    •  Hyperlipidemia Father    • Cancer Maternal Grandmother    • Diabetes Maternal Grandmother    • Ovarian cancer Maternal Grandmother    • Heart disease Maternal Grandfather    • Diabetes Paternal Grandmother    • Anxiety disorder Paternal Grandmother    • Thyroid disease Paternal Grandmother    • Coronary artery disease Paternal Grandfather    • Stroke Paternal Grandfather    • Heart disease Paternal Grandfather    • Anxiety disorder Paternal Aunt    • Depression Paternal Aunt    • Miscarriages / Stillbirths Paternal Aunt    • Arthritis Maternal Aunt    • Cancer Maternal Aunt    • Miscarriages / Stillbirths Daughter    • Colon cancer Neg Hx        Objective     Vitals:    23 1351   BP: 120/90   Temp: 96.6 °F (35.9 °C)     Body mass index is 23.53 kg/m².    Physical examination:  General Appearance:  Well developed, well nourished, well groomed, alert, and cooperative.  Cardiovascular: Regular rate and rhythm.  HEENT- normocephalic, atraumatic, sclera clear  Lungs-normal expansion, no wheezing  Heart-regular rate and rhythm  Extremities-positive pulses, no edema    Neurologic Exam     Mental Status   Oriented to person, place, and time.   Speech: speech is normal   Level of consciousness: alert    Cranial Nerves   Cranial nerves II through XII intact.     Motor Exam   Muscle bulk: normal  Overall muscle tone: normal    Strength   Right deltoid: 4/5  Left deltoid: 4/5  Right biceps: 4/5  Left biceps: 4/5  Right triceps: 4/5  Left triceps: 4/5  Right wrist flexion: 4/5  Left wrist flexion: 4/5  Right wrist extension: 4/5  Left wrist extension: 4/5  Right quadriceps: 4/5  Left quadriceps: 4/5  Right hamstrin/5  Left hamstrin/5  Right glutei: 4/5  Left glutei: 4/5  Right anterior tibial: 4/5  Left anterior tibial: 4/5  Right posterior tibial: 4/5  Left posterior tibial: 4/5  Right peroneal: 4/5  Left peroneal: 4/5  Right gastroc: 4/5  Left gastroc: 4/5    Sensory Exam   Light touch normal.     Gait,  Coordination, and Reflexes     Gait  Gait: normal  Wound examination: Dry, intact, with no evidence of wound dehiscence, no erythema or any drainage.      Review of imaging: I reviewed the patient's cervical spine x-ray that was performed on 1/4/2023.  Patient's hardware looks to be in alignment, with no evidence of any hardware malfunction, patient has some postsurgical changes with evidence of fusion/near fusion at C5-C6.    Assessment & Plan     1. Cervical radiculopathy at C6  - Ambulatory Referral to Physical Therapy POST OP (S/P ACDF)    2. Neck pain, musculoskeletal  - Ambulatory Referral to Physical Therapy POST OP (S/P ACDF)    3. Bulging of cervical intervertebral disc  - Ambulatory Referral to Physical Therapy POST OP (S/P ACDF)    Plan: I discussed this patient's case with my colleague Chan Garay PA-C.  52-year-old female who is well-known to Dr. Enriquez's service.  She is status post uncomplicated cervical discectomy anterior with fusion for C5-C6 performed by Dr. Enriquez on 12/13/2022.  Patient complains of some occasional right arm achy pain that starts in her shoulder and radiates down the arm, with her associated numbness/tingling in the fourth and fifth digits that was present preoperatively.  Which again was explained to her by Dr. Enriquez that this may be on her pathology in nature and not coming from her cervical C6 nerve root irritation.  Patient does admit that the pain she is experiencing is much less than it was preoperatively.  Patient cervical x-ray demonstrated no evidence of hardware malfunction, or misalignment, patient has postsurgical changes from her ACDF at C5-C6 which are to be expected.  Patient's Steri-Strips were removed and the incision site looked to be well epithelialized, with no evidence of erythema, wound dehiscence or drainage.  She denies any postoperative infectious symptoms.  I had a lengthy discussion with the patient about her postoperative status currently, after  speaking with her and answering her questions she feels better about where she is at postoperatively.  I again explained that this surgery may not be a fix all to her current symptomatology and that this is going to be a process as far as getting her back to where she needs to be and limiting her frustrations with her active lifestyle.  I informed the patient she could continue to progress activities as tolerated, such as driving and going back to her her job as a special .  I emphasized if she does experience any pain or any changes that are similar to preoperative symptoms that she is to give our office a call.  Patient can also shower and start returning back to her normal day-to-day activities.  I put in a referral for outpatient physical therapy to help the patient postoperatively as well.  I will see her back in 6 to 8 weeks time.  In the interim if she experiences any worsening symptomatology she is to call our office.      Bebeto Luis PA-C   [facility] [address] [address]  Fax [phone]  Phone [phone]

## 2023-01-10 ENCOUNTER — TELEPHONE (OUTPATIENT)
Dept: NEUROSURGERY | Facility: CLINIC | Age: 53
End: 2023-01-10

## 2023-01-10 NOTE — TELEPHONE ENCOUNTER
Provider:  Mary  Surgery/Procedure:  ACDF C5-6  Surgery/Procedure Date:  12/13/22  Last visit:   1/4/23  Next visit: 2/20/23     Reason for call: I spoke with Ms. Manning, she is complaining of increased pain in her right arm, different than what she had prior to surgery. It began on 1/3/23 the day before her last office visit, the pain begins in her right shoulder and radiates down the arm to her hand, describes it as a burning sensation that is quite intense, at first Tylenol had eased it but not any longer, she reports it has gotten progressively worse. She has some weakness in her right hand, has not dropped anything but reports a weaker  that she notices when writing. She has tried heat pads that do sooth the pain some, but it immediately returns when she takes the heating pad off. She is also starting PT tomorrow, is concerned that this will agitate the pain further and is asking if she should reschedule?

## 2023-01-10 NOTE — TELEPHONE ENCOUNTER
Called and discussed with the patient.  Patient is having neck pain with overactivity which then precipitates some of the similar numbness distribution and soreness that she had prior to surgery.  This remits overnight.    I have encouraged her to avoid overdoing it.  Patient needs to listen to her neck.  Have also given her instructions to take 2 days worth of ibuprofen to decrease inflammation but thereafter she needs to avoid exacerbating activities.  This may include work and we be happy to write her off work if she were to need or request it from her employer.

## 2023-01-10 NOTE — TELEPHONE ENCOUNTER
Caller: PATIENT    Relationship: SELF    Best call back number:[phone]    What is the best time to reach you:ANYTIME    Who are you requesting to speak with (clinical staff, provider,  specific staff member):CLINICAL STAFF    Do you know the name of the person who called: NA    What was the call regarding:PT CALLED AND STATES THAT SHE IS STARTING PHYSICAL THERAPY TOMORROW AND IS CONCERNED AS SHE HAS BEEN HAVING PAIN STILL IN HER ARM-PT STATES THAT SHE DID ADDRESS THIS AT HER LAST OFFICE VISIT AND WAS TOLD TO TAKE TYLENOL WHICH SHE STATES IS NOT HELPING-PT IS CONCERNED ABOUT STARTING PT TOMORROW WITH HOW SHE IS FEELING-PT WOULD LIKE A CALL BACK TO DISCUSS THANK YOU    Do you require a callback:YES

## 2023-01-18 NOTE — TELEPHONE ENCOUNTER
PT WOULD LIKE A WORK NOTE TO REMAIN OFF WORK UNTIL SHE IS FEELING BETTER. PLEASE MAIL IT TO ADDRESS ON PT CHART.     THANK YOU,

## 2023-01-24 ENCOUNTER — TELEPHONE (OUTPATIENT)
Dept: NEUROSURGERY | Facility: CLINIC | Age: 53
End: 2023-01-24
Payer: COMMERCIAL

## 2023-01-24 NOTE — TELEPHONE ENCOUNTER
I spoke to the patient on the phone and we will schedule her tomorrow for a follow-up checkup in the afternoon with myself for her neck/arm pain.

## 2023-01-24 NOTE — TELEPHONE ENCOUNTER
Provider:  Mary  Surgery/Procedure:  ACDF C5-6  Surgery/Procedure Date:  12/13/22  Last visit:   1/4/23  Next visit: 2/20/23     Reason for call: Patient called following up about right arm pain. Last spoke to her on 1/10/23 about the pain, she was instructed to take ibuprofen 800mg TID for two days to decrease inflammation and avoid exacerbating activities. She reports that the ibuprofen did bring her relief but that the symptoms have returned. She is not doing a lot of activity, is in PT however taking it easy with some stretching and ROM and using a TENS unit. The pain she describes in the right shoulder radiating down to her elbow, and is a burning and tingling pain. She did not have this sort of pain prior to surgery, and reports it began a few days after her appointment on 1/4/23. Cervical spine XR on 1/4/23 was negative. Reports no falls or exacerbating events. Nothing other than the short term dose of ibuprofen has given relief.

## 2023-01-25 ENCOUNTER — OFFICE VISIT (OUTPATIENT)
Dept: NEUROSURGERY | Facility: CLINIC | Age: 53
End: 2023-01-25
Payer: COMMERCIAL

## 2023-01-25 VITALS
BODY MASS INDEX: 22.66 KG/M2 | HEIGHT: 66 IN | WEIGHT: 141 LBS | SYSTOLIC BLOOD PRESSURE: 116 MMHG | DIASTOLIC BLOOD PRESSURE: 70 MMHG | TEMPERATURE: 97.5 F

## 2023-01-25 DIAGNOSIS — M25.511 CHRONIC RIGHT SHOULDER PAIN: ICD-10-CM

## 2023-01-25 DIAGNOSIS — M54.2 NECK PAIN, MUSCULOSKELETAL: Primary | ICD-10-CM

## 2023-01-25 DIAGNOSIS — M54.12 CERVICAL RADICULOPATHY AT C6: ICD-10-CM

## 2023-01-25 DIAGNOSIS — G89.29 CHRONIC RIGHT SHOULDER PAIN: ICD-10-CM

## 2023-01-25 PROCEDURE — 99024 POSTOP FOLLOW-UP VISIT: CPT

## 2023-01-25 NOTE — PROGRESS NOTES
DOS: 2023  NAME: Francesca Manning   : 1970  PCP: Dominique Blunt PA-C  Referring MD: No ref. provider found    Chief Complaint:    Chief Complaint   Patient presents with   • Neck Pain   • Arm Pain     RUE        History of Present Illness:  52 y.o. female is known to Dr. Enriquez's service, she works as a special  and has a 2-year history of cervical neck discomfort and pain that began subacutely in the right parascapular area that radiates to the fourth and fifth digits, she has been through several rounds of physical therapy and injections and has really had a significant mount of discomfort.  She underwent physical therapy for frozen shoulder on her right, but that did not resolve her pain.  She stated it affects her sleeping greatly affect her quality of life.  She is seen in consultation with Dr. Enriquez on 10/17/2022 found to be a neurosurgical candidate.  She underwent an uncomplicated cervical discectomy with anterior fusion for C5-C6 vertebrae as performed by Dr. Enriquez on 2022.  She had an outpatient follow-up with myself on 2023 where she states she is doing well cervical x-ray at that visit revealed postoperative changes from C5-C6 ACDF with no evidence of hardware malfunction.  At that visit she had no other issues or complaints besides some incisional type neck pain as well as some occasional right arm aching. She was started on some postoperative physical therapy at this visit.  Since then she has called several times with some complaints of incisional type neck pain with overactivity, with precipitating right arm pain that starts in the neck will radiate down the right shoulder into the right arm and sometimes into the hand.  She describes some occasional numbness as well as soreness that she had prior to surgery.  She spoke on the phone with Chan Garay PA-C about this complaint on 1/10/2023 and she was encouraged to not overdo it and take 2 days worth of  "ibuprofen 8 or milligrams 3 times daily and also avoid exacerbating activities.  She called back yesterday and states that she is still experiencing this right arm \"aching/burning pain\" with associated numbness and tingling that is slightly different from her preoperative pain.  It was determined that she would need to come in for a full visit examination.  She states that she has some incisional appointment neck pain still that is minimal.  However, she has this aching/burning sensation that will start around her bicep and shoot down into the arm and into her third and fourth digits.  Patient states it is not constant and ibuprofen and Tylenol relieve the pain, however the pain continues to persist.  Patient admits to doing some outpatient physical therapy that she states seems to be helping.  Patient states that however when she has these \" episodes\" of the right arm pain, that are different than preoperatively she has to go lay down and sleep them off they are so severe.  She denies any upper or lower extremity weakness, only admits to occasional numbness and tingling, states the pain is different than preoperatively and that her preoperative pain is better.  Patient is very anxious and concerned.    Past Medical History:  Past Medical History:   Diagnosis Date   • Cervical disc disorder 2021   • Diabetes mellitus (HCC) Gestational   • GERD (gastroesophageal reflux disease)    • Gestational diabetes    • Hypercholesteremia    • Migraines        Past Surgical History:  Past Surgical History:   Procedure Laterality Date   • ANTERIOR CERVICAL DISCECTOMY W/ FUSION N/A 2022    Procedure: CERVICAL DISCECTOMY ANTERIOR WITH FUSION C5-6;  Surgeon: Giovany Enriquez MD;  Location: [facility];  Service: Neurosurgery;  Laterality: N/A;   •  SECTION  2010    Boy: Hitesh   •  SECTION PRIMARY  2002    Boy: [place]   • CYSTOSCOPY      X 2   • EYE SURGERY      RETINAL DETACHMENT X3   • " HYSTEROSCOPY W/ POLYPECTOMY  03/04/2022    with myosure lite, D&PETRA   • NECK SURGERY  Dec 13, 2022   • SPINAL FUSION  Dec 13, 2022       Review of Systems:        Review of Systems   Constitutional: Negative for activity change, appetite change, chills, diaphoresis, fatigue, fever and unexpected weight change.   HENT: Negative for congestion, dental problem, drooling, ear discharge, ear pain, facial swelling, hearing loss, mouth sores, nosebleeds, postnasal drip, rhinorrhea, sinus pressure, sinus pain, sneezing, sore throat, tinnitus, trouble swallowing and voice change.    Eyes: Negative for photophobia, pain, discharge, redness, itching and visual disturbance.   Respiratory: Negative for apnea, cough, choking, chest tightness, shortness of breath, wheezing and stridor.    Cardiovascular: Negative for chest pain, palpitations and leg swelling.   Gastrointestinal: Negative for abdominal distention, abdominal pain, anal bleeding, blood in stool, constipation, diarrhea, nausea, rectal pain and vomiting.   Endocrine: Negative for cold intolerance, heat intolerance, polydipsia, polyphagia and polyuria.   Genitourinary: Negative for decreased urine volume, difficulty urinating, dyspareunia, dysuria, enuresis, flank pain, frequency, genital sores, hematuria, menstrual problem, pelvic pain, urgency, vaginal bleeding, vaginal discharge and vaginal pain.   Musculoskeletal: Positive for neck pain. Negative for arthralgias, back pain, gait problem, joint swelling, myalgias and neck stiffness.   Skin: Negative for color change, pallor, rash and wound.   Allergic/Immunologic: Negative for environmental allergies, food allergies and immunocompromised state.   Neurological: Negative for dizziness, tremors, seizures, syncope, facial asymmetry, speech difficulty, weakness, light-headedness, numbness and headaches.   Hematological: Negative for adenopathy. Does not bruise/bleed easily.   Psychiatric/Behavioral: Negative for agitation,  behavioral problems, confusion, decreased concentration, dysphoric mood, hallucinations, self-injury, sleep disturbance and suicidal ideas. The patient is not nervous/anxious and is not hyperactive.         Medications    Current Outpatient Medications:   •  buPROPion XL (WELLBUTRIN XL) 300 MG 24 hr tablet, Take 1 tablet by mouth Daily., Disp: 90 tablet, Rfl: 3  •  cholecalciferol (VITAMIN D3) 250 MCG (23818 UT) capsule, Take  by mouth Daily. 1000 IU, Disp: , Rfl: 5  •  Cyanocobalamin (B-12 PO), Take 1,000 mcg by mouth Daily., Disp: , Rfl:   •  famotidine (PEPCID) 20 MG tablet, Take 20 mg by mouth Every Other Day., Disp: , Rfl:   •  ferrous sulfate 325 (65 FE) MG tablet, Take 325 mg by mouth Daily With Breakfast., Disp: , Rfl:   •  HYDROcodone-acetaminophen (NORCO) 5-325 MG per tablet, Take 1-2 tablets by mouth Every 4 (Four) Hours As Needed (Pain)., Disp: 30 tablet, Rfl: 0  •  levonorgestrel-ethinyl estradiol (SEASONALE) 0.15-0.03 MG per tablet, TAKE ONE TABLET BY MOUTH EVERY DAY, Disp: 91 tablet, Rfl: 0  •  methocarbamol (ROBAXIN) 750 MG tablet, Take 1 tablet by mouth 3 (Three) Times a Day As Needed for Muscle Spasms for up to 90 doses. This medicine will help with back pain from spasm take as needed use caution when starting medicine to ensure it does not cause drowsiness or other side effects, Disp: 90 tablet, Rfl: 0  •  ondansetron ODT (ZOFRAN-ODT) 4 MG disintegrating tablet, Place 1 tablet on the tongue Every 8 (Eight) Hours As Needed for Nausea or Vomiting., Disp: 15 tablet, Rfl: 1  •  spironolactone (Aldactone) 25 MG tablet, Take 1 tablet by mouth Daily., Disp: 90 tablet, Rfl: 3  •  SUMAtriptan (IMITREX) 50 MG tablet, Take  by mouth., Disp: , Rfl:   •  Vascepa 1 g capsule capsule, Take 2 g by mouth 2 (Two) Times a Day With Meals., Disp: 120 capsule, Rfl: 0    Allergies:  Allergies   Allergen Reactions   • Sulfa Antibiotics Rash       Social Hx:  Social History     Socioeconomic History   • Marital status:     Tobacco Use   • Smoking status: Never   • Smokeless tobacco: Never   Vaping Use   • Vaping Use: Never used   Substance and Sexual Activity   • Alcohol use: No   • Drug use: No   • Sexual activity: Yes     Partners: Male     Birth control/protection: OCP       Family Hx:  Family History   Problem Relation Age of Onset   • Cancer Mother         Breast   • Asthma Mother    • Breast cancer Mother 71   • Vision loss Mother    • Cancer Father         Breast   • Arthritis Father    • Breast cancer Father 68   • Diabetes Father    • Hyperlipidemia Father    • Cancer Maternal Grandmother    • Diabetes Maternal Grandmother    • Ovarian cancer Maternal Grandmother    • Heart disease Maternal Grandfather    • Diabetes Paternal Grandmother    • Anxiety disorder Paternal Grandmother    • Thyroid disease Paternal Grandmother    • Coronary artery disease Paternal Grandfather    • Stroke Paternal Grandfather    • Heart disease Paternal Grandfather    • Anxiety disorder Paternal Aunt    • Depression Paternal Aunt    • Miscarriages / Stillbirths Paternal Aunt    • Arthritis Maternal Aunt    • Cancer Maternal Aunt    • Miscarriages / Stillbirths Daughter    • Colon cancer Neg Hx        Review of Imaging: No new imaging studies to review this visit, cervical x-ray performed on 1/04/2023 showed postoperative ACDF changes at C5-C6 with no evidence of hardware malfunction.      Laboratory Result:  Lab Results   Component Value Date    HGBA1C 5.0 12/02/2022       Physical Examination:  Vitals:    01/25/23 1208   BP: 116/70   Temp: 97.5 °F (36.4 °C)        General Appearance:   Well developed, well nourished, well groomed, alert, and cooperative.  Cardiovascular: Regular rate and rhythm. No carotid bruits  Peripheral Vasculature: Radial and pedal pulses are equal and symmetric. No signs of distal embolization.      Neurological examination:  Neurologic Exam     Mental Status   Oriented to person, place, and time.   Speech: speech  "is normal   Level of consciousness: alert    Cranial Nerves   Cranial nerves II through XII intact.     Motor Exam   Muscle bulk: normal    Strength   Right neck flexion: 4/5  Left neck flexion: 4/5  Right neck extension: 4/5  Left neck extension: 4/5  Right deltoid: 4/5  Left deltoid: 4/5  Right biceps: 4/5  Left biceps: 4/5  Right triceps: 4/5  Left triceps: 4/5  Right wrist flexion: 4/5  Left wrist flexion: 4/5  Right wrist extension: 4/5  Left wrist extension: 4/5  Right interossei: 4/5  Left interossei: 4/5  Right abdominals: 4/5  Left abdominals: 4/5  Right iliopsoas: 4/5  Left iliopsoas: 4/5  Right quadriceps: 4/5  Left quadriceps: 4/5  Right hamstrin/5  Left hamstrin/5  Right glutei: 4/5  Left glutei: 4/5  Right anterior tibial: 4/5  Left anterior tibial: 4/5  Right posterior tibial: 4/5  Left posterior tibial: 4/5  Right peroneal: 4/5  Left peroneal: 4/5  Right gastroc: 4/5  Left gastroc: 4/5    Sensory Exam   Light touch normal.     Gait, Coordination, and Reflexes     Gait  Gait: normal    Reflexes   Right brachioradialis: 2+  Left brachioradialis: 2+  Right biceps: 2+  Left biceps: 2+  Right triceps: 2+  Left triceps: 2+  Right patellar: 2+  Left patellar: 2+  Right achilles: 2+  Left achilles: 2+  Right : 2+  Left : 2+      Diagnoses/Plan:   Discussed this patient's case with my colleague Kayleen Minor PA-C.  Is a 52-year-old female who is well-known to Dr. Enriquez's service she is status post uncomplicated cervical discectomy anterior fusion for C5-C6 performed by Dr. Enriquez on 2022.  Patient complained at first of some occasional right arm achy pain that will start her shoulder and radiate down the arm with associated numbness and tingling the fourth and fifth digits, that was present preoperatively.  Patient states that after a round of ibuprofen this pain eventually got better.  She states she now experiences a different pain, she experiences a pain that is \"achy/burning\" that " "will start around the biceps region and radiate down into her hand with associated numbness and tingling in the fingers fourth and fifth digits.  However, she states that this pain often depends on what position she is in.  She states she is taking it easy, except for doing outpatient postoperative physical therapy.  Patient is rather concerned about this pain, and that is slightly different pain from the one she experienced preoperatively.  I explained to the patient that this is most likely again, some postoperative inflammation/nerve root irritation.  Especially, if she obtains relief with Tylenol and ibuprofen.  Patient states she was afraid to take ibuprofen due to\"delayed wound healing\", patient's cervical x-ray that was performed on 1/04/2023 looked good, with just evidence of status post C5-C6 ACDF with no evidence of hardware malfunction.  I again instructed the patient to continue to keep moving, but to listen to her body and not overdo it.  I instructed the patient to alternate Tylenol and ibuprofen for 1 week to see if that helps relieve some of the pain.  I will touch base with the patient via telephone visit in 1 week's time to reassess her current pain.  In the interim if she experiences any worsening symptoms or signs and symptoms of cervical myelopathy, which were discussed with her she is to call 911 or report to the nearest emergency room.                     "

## 2023-01-25 NOTE — TELEPHONE ENCOUNTER
Called and spoke w/ Pt today (01/25), and she's added @12:30pm w/ Bebeto Luis PA-C. She is thankful for it.

## 2023-02-03 ENCOUNTER — OFFICE VISIT (OUTPATIENT)
Dept: NEUROSURGERY | Facility: CLINIC | Age: 53
End: 2023-02-03
Payer: COMMERCIAL

## 2023-02-03 VITALS — WEIGHT: 141 LBS | HEIGHT: 66 IN | BODY MASS INDEX: 22.66 KG/M2

## 2023-02-03 DIAGNOSIS — M25.511 CHRONIC RIGHT SHOULDER PAIN: Primary | ICD-10-CM

## 2023-02-03 DIAGNOSIS — M54.12 CERVICAL RADICULOPATHY AT C6: ICD-10-CM

## 2023-02-03 DIAGNOSIS — G89.29 CHRONIC RIGHT SHOULDER PAIN: Primary | ICD-10-CM

## 2023-02-03 DIAGNOSIS — M54.12 CERVICAL RADICULOPATHY AT C6: Primary | ICD-10-CM

## 2023-02-03 DIAGNOSIS — M54.12 CERVICAL RADICULOPATHY: ICD-10-CM

## 2023-02-03 PROCEDURE — 99024 POSTOP FOLLOW-UP VISIT: CPT

## 2023-02-03 RX ORDER — PREGABALIN 75 MG/1
75 CAPSULE ORAL 2 TIMES DAILY
Qty: 30 CAPSULE | Refills: 1 | Status: SHIPPED | OUTPATIENT
Start: 2023-02-03 | End: 2023-02-24 | Stop reason: SDUPTHER

## 2023-02-03 RX ORDER — ACETAMINOPHEN 500 MG
TABLET ORAL
COMMUNITY
Start: 2023-01-25

## 2023-02-03 RX ORDER — PREGABALIN 75 MG/1
75 CAPSULE ORAL 2 TIMES DAILY
Qty: 30 CAPSULE | Refills: 1 | Status: SHIPPED | OUTPATIENT
Start: 2023-02-03 | End: 2023-02-03 | Stop reason: SDUPTHER

## 2023-02-03 RX ORDER — OMEGA-3 FATTY ACIDS/FISH OIL 300-1000MG
CAPSULE ORAL
COMMUNITY
Start: 2023-01-25

## 2023-02-03 NOTE — PROGRESS NOTES
Office Visit      Date: 2023  Patient Name: Francesca Manning  : 1970   MRN: [id number]     Chief Complaint:    Chief Complaint   Patient presents with   • Follow-up     1 week follow up  Still having right arm pain     I spent approximately 30 minutes speaking with the patient this telephone visit.    You have chosen to receive care through a telephone visit today and you consent to receiving care through telephone?  Yes      History of Present Illness: Francesca Manning is a 52 y.o. female who is here today for 1 week phone follow-up from her office visit on 2023.  Is a 52-year-old female who is known to Dr. Enriquez service, she works at a special  and has a 2-year history of chronic neck pain that began subacutely in the right parascapular area radius of the fourth and fifth digits, she has been through several rounds of physical therapy injections that had really significant amount of discomfort underwent physical there for an shoulder on her right side.  However, that did not resolve her pain.  She was seen in consultation by Dr. Enriquez on 10/17/2022, she underwent an uncomplicated cervical discectomy with anterior fusion C5-C6 vertebrae as performed Dr. Enriquez on 2021 2.  Outpatient follow-up myself on 104 23 demonstrates a cervical x-ray with postoperative changes from C5-C6 ACDF with no evidence of hardware malfunction.  At that time she had no other issues or complaints besides some incisional type neck pain and some occasional right arm aching.  She started on's postoperative physical therapy at that point.  Since then she has called several times complaints of incisional type neck pain with overactivity and precipitating right arm pain that started in the neck and radiate down the right shoulder into the right arm symptoms of the hand with occasional numbness and tingling of the fourth of the digits.  Focused Mbit PA-C about this complaint on 1/10/2023, encouraged  "not to overdo it and take 2 days with ibuprofen 800 mg 3 times daily and avoid exacerbating activities.  She will call back on 1/24/2023 still experiencing some right arm \"achy/burning pain\", she was seen by myself in clinic on 1/25/2023 I encouraged her to continue take it easy, continue with her outpatient postoperative physical therapy and to alternate Tylenol instead of ibuprofen, and only take the ibuprofen as needed to try and see if this will give us her some relief.  I am calling her today to check and see if she is obtaining relief.  She states that she has had several bad days at the beginning of this week however, she is experiencing less pain today currently rates her right arm pain a 4 or 5 out of 10 at the most it is a 8 out of 10.  Patient states she has been alternating Tylenol and ibuprofen.  Patient is very frustrated with her current chronic pain status, however she states that the pain is \"different\" than it was preoperatively.  She states that the numbness and tingling that was present in the digits is gone and she feels like her right hand is about the same as the left hand at this point.  She denies any of the radicular pain starting in the neck radiating down the parascapular region into the hands, she now complains of some right arm pain that stays in the anterior shoulder and anterior bicep region denying any of the pain that was present preoperatively.  Denies any incisional type neck pain today that was present last week in clinic.  Denies any upper or lower extremity weakness, denies any numbness or tingling, denies any gait instability.    Subjective   Review of Systems:  Review of Systems   Constitutional: Negative for activity change, appetite change, chills, diaphoresis, fatigue, fever and unexpected weight change.   HENT: Negative for congestion, dental problem, drooling, ear discharge, ear pain, facial swelling, hearing loss, mouth sores, nosebleeds, postnasal drip, rhinorrhea, " sinus pressure, sinus pain, sneezing, sore throat, tinnitus, trouble swallowing and voice change.    Eyes: Negative for photophobia, pain, discharge, redness, itching and visual disturbance.   Respiratory: Negative for apnea, cough, choking, chest tightness, shortness of breath, wheezing and stridor.    Cardiovascular: Negative for chest pain, palpitations and leg swelling.   Gastrointestinal: Negative for abdominal distention, abdominal pain, anal bleeding, blood in stool, constipation, diarrhea, nausea, rectal pain and vomiting.   Endocrine: Negative for cold intolerance, heat intolerance, polydipsia, polyphagia and polyuria.   Genitourinary: Negative for decreased urine volume, difficulty urinating, dysuria, enuresis, flank pain, frequency, genital sores, hematuria and urgency.   Musculoskeletal: Positive for myalgias (shoulder and arm ). Negative for arthralgias, back pain, gait problem, joint swelling, neck pain and neck stiffness.   Skin: Negative for color change, pallor, rash and wound.   Allergic/Immunologic: Negative for environmental allergies, food allergies and immunocompromised state.   Neurological: Negative for dizziness, tremors, seizures, syncope, facial asymmetry, speech difficulty, weakness, light-headedness, numbness and headaches.   Hematological: Negative for adenopathy. Does not bruise/bleed easily.   Psychiatric/Behavioral: Negative for agitation, behavioral problems, confusion, decreased concentration, dysphoric mood, hallucinations, self-injury, sleep disturbance and suicidal ideas. The patient is not nervous/anxious and is not hyperactive.         Past Medical History:  Past Medical History:   Diagnosis Date   • Cervical disc disorder Feb 2021   • Diabetes mellitus (HCC) Gestational   • GERD (gastroesophageal reflux disease)    • Gestational diabetes    • Hypercholesteremia    • Migraines        Past Surgical History:  Past Surgical History:   Procedure Laterality Date   • ANTERIOR  CERVICAL DISCECTOMY W/ FUSION N/A 2022    Procedure: CERVICAL DISCECTOMY ANTERIOR WITH FUSION C5-6;  Surgeon: Giovany Enriquez MD;  [facility];  Service: Neurosurgery;  Laterality: N/A;   •  SECTION  2010    Boy: Hitesh   •  SECTION PRIMARY  2002    Boy: [place]   • CYSTOSCOPY      X 2   • EYE SURGERY      RETINAL DETACHMENT X3   • HYSTEROSCOPY W/ POLYPECTOMY  2022    with myosure lite, D&C   • NECK SURGERY  Dec 13, 2022   • SPINAL FUSION  Dec 13, 2022       Medications    Current Outpatient Medications:   •  acetaminophen (TYLENOL) 500 MG tablet, , Disp: , Rfl:   •  buPROPion XL (WELLBUTRIN XL) 300 MG 24 hr tablet, Take 1 tablet by mouth Daily., Disp: 90 tablet, Rfl: 3  •  cholecalciferol (VITAMIN D3) 250 MCG (36966 UT) capsule, Take  by mouth Daily. 1000 IU, Disp: , Rfl: 5  •  Cyanocobalamin (B-12 PO), Take 1,000 mcg by mouth Daily., Disp: , Rfl:   •  famotidine (PEPCID) 20 MG tablet, Take 20 mg by mouth Every Other Day., Disp: , Rfl:   •  ferrous sulfate 325 (65 FE) MG tablet, Take 325 mg by mouth Daily With Breakfast., Disp: , Rfl:   •  Ibuprofen 200 MG capsule, , Disp: , Rfl:   •  levonorgestrel-ethinyl estradiol (SEASONALE) 0.15-0.03 MG per tablet, TAKE ONE TABLET BY MOUTH EVERY DAY, Disp: 91 tablet, Rfl: 0  •  ondansetron ODT (ZOFRAN-ODT) 4 MG disintegrating tablet, Place 1 tablet on the tongue Every 8 (Eight) Hours As Needed for Nausea or Vomiting., Disp: 15 tablet, Rfl: 1  •  spironolactone (Aldactone) 25 MG tablet, Take 1 tablet by mouth Daily., Disp: 90 tablet, Rfl: 3  •  SUMAtriptan (IMITREX) 50 MG tablet, Take  by mouth., Disp: , Rfl:   •  Vascepa 1 g capsule capsule, Take 2 g by mouth 2 (Two) Times a Day With Meals., Disp: 120 capsule, Rfl: 0  •  methocarbamol (ROBAXIN) 750 MG tablet, Take 1 tablet by mouth 3 (Three) Times a Day As Needed for Muscle Spasms for up to 90 doses. This medicine will help with back pain from spasm take as needed use caution  when starting medicine to ensure it does not cause drowsiness or other side effects, Disp: 90 tablet, Rfl: 0    Allergies:  Allergies   Allergen Reactions   • Sulfa Antibiotics Rash       Social Hx:  Social History     Tobacco Use   • Smoking status: Never   • Smokeless tobacco: Never   Vaping Use   • Vaping Use: Never used   Substance Use Topics   • Alcohol use: No   • Drug use: No       Family Hx:  Family History   Problem Relation Age of Onset   • Cancer Mother         Breast   • Asthma Mother    • Breast cancer Mother 71   • Vision loss Mother    • Cancer Father         Breast   • Arthritis Father    • Breast cancer Father 68   • Diabetes Father    • Hyperlipidemia Father    • Cancer Maternal Grandmother    • Diabetes Maternal Grandmother    • Ovarian cancer Maternal Grandmother    • Heart disease Maternal Grandfather    • Diabetes Paternal Grandmother    • Anxiety disorder Paternal Grandmother    • Thyroid disease Paternal Grandmother    • Coronary artery disease Paternal Grandfather    • Stroke Paternal Grandfather    • Heart disease Paternal Grandfather    • Anxiety disorder Paternal Aunt    • Depression Paternal Aunt    • Miscarriages / Stillbirths Paternal Aunt    • Arthritis Maternal Aunt    • Cancer Maternal Aunt    • Miscarriages / Stillbirths Daughter    • Colon cancer Neg Hx        Objective     There were no vitals filed for this visit.  Body mass index is 22.77 kg/m².    Physical examination:  This part of physical examination being performed via telephone visit, patient is alert oriented able converse me appropriately, no slurred speech, patient appears to be at her neurological baseline.  Complaints of persistent right arm pain that stays in anterior bicep and anterior shoulder region.    Review of imaging: No new imaging studies to review this visit, cervical x-ray performed on 1/04/2023 showed postoperative ACDF changes at C5-C6 with no evidence of hardware malfunction.    Assessment & Plan      1. S/P C5-C6 ACDF    2. Chronic right shoulder pain    3. Cervical radiculopathy at C6    Plan: I discussed this patient's case with Dr. Enriquez, he also spoke to the patient on the phone.  This patient has some chronic pain type pathology, which was discussed with her preoperatively stating that this surgery may not fix all of her chronic right arm/shoulder/neck pain.  However, the patient is still pretty early in her postoperative recovery phase.  We will encourage the patient to continue with her postoperative physical therapy continue to move her neck and arm as tolerated.  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  We will keep the patient follow-up appointment with myself on 20 February, this will be the last postoperative visit.  In the interim, the patient was encouraged to limit her ibuprofen use and continue to use Tylenol as needed for pain.  I will also call the patient and some Lyrica 75 mg p.o. for this right-sided arm/shoulder pain.  Patient is postop cervical x-ray that was performed on 104 2023 shows postoperative ACDF changes C5-C6 no evidence of hardware malfunction or malalignment.  We encouraged the Ms. Manning to continue to be patient with her current postoperative recovery stating that this type of surgery sometimes can take up to 3 to 6 months to see a full resolution in a patient's pain especially if they have chronic pain for years.  I think is also appropriate for the patient see orthopedics in consultation regarding her right shoulder, as she has had a right frozen shoulder in the past with some possible right shoulder pathology.  We will see the patient back on the 20 February.  In the interim if she experiences any acute worsening of symptoms, any signs symptoms of myelopathy which were discussed with the patient she can call our office or report to the nearest  emergency room.    Bebeto Luis PA-C  [facility] [place]  [address] [address]  [facility] [address]  Fax [phone]  Phone [phone]

## 2023-02-03 NOTE — TELEPHONE ENCOUNTER
Caller: Francesca Manning Radha    Relationship: Self    Best call back number: [phone]    Requested Prescriptions:   Requested Prescriptions     Pending Prescriptions Disp Refills   • pregabalin (LYRICA) 75 MG capsule 30 capsule 1     Sig: Take 1 capsule by mouth 2 (Two) Times a Day.        Pharmacy where request should be sent:    [facility] - [place], [place] - [address] - [phone]  - [phone] FX   [address] [place] [address]   Phone: [phone] Fax: [phone]   Hours: Not open 24 hours     Additional details provided by patient: PATIENT STATES NEW LYRICA MEDICATION FROM TODAY'S TELEHEALTH APPT WAS SENT TO [facility]; PHARMACY CALLED PATIENT AND SAID IT 'COULD NOT BE TRANSFERRED; THAT A NEW ORDER WOULD NEED TO BE SENT TO THE CORRECT PHARMACY ([facility]).'     PATIENT REQUESTING C/B ONCE RE-SENT/ORDERED TO CORRECT PHARMACY AND SENDING AS HIGH PRIORITY D/T NO SUPPLY AND NEW RX.    Does the patient have less than a 3 day supply:  [x] Yes  [] No    Would you like a call back once the refill request has been completed: [x] Yes [] No    If the office needs to give you a call back, can they leave a voicemail: [x] Yes [] No    Sourav Cano Rep   02/03/23 13:41 EST

## 2023-02-03 NOTE — TELEPHONE ENCOUNTER
Provider:  Rohan  Surgery/Procedure:  ACDF C5-6  Surgery/Procedure Date:  12/13/22  Last visit:   2/3/23  Next visit: 2/20/23     Reason for call: Lyrica was sent in today, however to the incorrect pharmacy. I have pended the medication at the correct pharmacy below.    Greg:  12/13/2022 Hydrocodone Bitartrate/Ac 325MG/5MG 1970 30 4 Giovany Enriquez [facility], [facility] 38 1

## 2023-02-09 ENCOUNTER — OFFICE VISIT (OUTPATIENT)
Dept: ORTHOPEDIC SURGERY | Facility: CLINIC | Age: 53
End: 2023-02-09
Payer: COMMERCIAL

## 2023-02-09 VITALS
DIASTOLIC BLOOD PRESSURE: 80 MMHG | HEIGHT: 66 IN | SYSTOLIC BLOOD PRESSURE: 115 MMHG | WEIGHT: 141 LBS | BODY MASS INDEX: 22.66 KG/M2

## 2023-02-09 DIAGNOSIS — M25.511 RIGHT SHOULDER PAIN, UNSPECIFIED CHRONICITY: Primary | ICD-10-CM

## 2023-02-09 DIAGNOSIS — M75.41 ROTATOR CUFF IMPINGEMENT SYNDROME OF RIGHT SHOULDER: ICD-10-CM

## 2023-02-09 DIAGNOSIS — M19.011 OSTEOARTHRITIS OF RIGHT AC (ACROMIOCLAVICULAR) JOINT: ICD-10-CM

## 2023-02-09 PROCEDURE — 20610 DRAIN/INJ JOINT/BURSA W/O US: CPT | Performed by: STUDENT IN AN ORGANIZED HEALTH CARE EDUCATION/TRAINING PROGRAM

## 2023-02-09 PROCEDURE — 99204 OFFICE O/P NEW MOD 45 MIN: CPT | Performed by: STUDENT IN AN ORGANIZED HEALTH CARE EDUCATION/TRAINING PROGRAM

## 2023-02-09 RX ORDER — TRIAMCINOLONE ACETONIDE 40 MG/ML
80 INJECTION, SUSPENSION INTRA-ARTICULAR; INTRAMUSCULAR
Status: COMPLETED | OUTPATIENT
Start: 2023-02-09 | End: 2023-02-09

## 2023-02-09 RX ORDER — LIDOCAINE HYDROCHLORIDE 10 MG/ML
4 INJECTION, SOLUTION EPIDURAL; INFILTRATION; INTRACAUDAL; PERINEURAL
Status: COMPLETED | OUTPATIENT
Start: 2023-02-09 | End: 2023-02-09

## 2023-02-09 RX ORDER — ROPIVACAINE HYDROCHLORIDE 5 MG/ML
4 INJECTION, SOLUTION EPIDURAL; INFILTRATION; PERINEURAL
Status: COMPLETED | OUTPATIENT
Start: 2023-02-09 | End: 2023-02-09

## 2023-02-09 RX ADMIN — TRIAMCINOLONE ACETONIDE 80 MG: 40 INJECTION, SUSPENSION INTRA-ARTICULAR; INTRAMUSCULAR at 10:04

## 2023-02-09 RX ADMIN — LIDOCAINE HYDROCHLORIDE 4 ML: 10 INJECTION, SOLUTION EPIDURAL; INFILTRATION; INTRACAUDAL; PERINEURAL at 10:04

## 2023-02-09 RX ADMIN — ROPIVACAINE HYDROCHLORIDE 4 ML: 5 INJECTION, SOLUTION EPIDURAL; INFILTRATION; PERINEURAL at 10:04

## 2023-02-09 NOTE — PROGRESS NOTES
Procedure   Large Joint Arthrocentesis: R subacromial bursa  Date/Time: 2/9/2023 10:04 AM  Consent given by: patient  Site marked: site marked  Timeout: Immediately prior to procedure a time out was called to verify the correct patient, procedure, equipment, support staff and site/side marked as required   Supporting Documentation  Indications: pain   Procedure Details  Location: shoulder - R subacromial bursa  Preparation: Patient was prepped and draped in the usual sterile fashion  Needle size: 22 G  Approach: posterior  Medications administered: 4 mL ropivacaine 0.5 %; 4 mL lidocaine PF 1% 1 %; 80 mg triamcinolone acetonide 40 MG/ML  Patient tolerance: patient tolerated the procedure well with no immediate complications

## 2023-02-09 NOTE — PROGRESS NOTES
[facility] [place] Orthopa[facility] Surgery Office Visit     Office Visit       Date: 2023   Patient Name: Francesca Manning  MRN: [id number]  YOB: 1970    Referring Physician: Bebeto Luis*     Chief Complaint:   Chief Complaint   Patient presents with   • Right Shoulder - Pain, Initial Evaluation     ACDF C5-6 22 (Mina)       History of Present Illness:   Francesca Manning is a 52 y.o. female who presents with new problem of: right shoulder pain.  Onset: atraumatic and gradual in nature. The issue has been ongoing for 3 week(s). Pain is a 3/10 on the pain scale. Pain is described as dull and aching. Associated symptoms include pain. The pain is worse with lying on affected side; lying down improve the pain. Previous treatments have included: physical therapy.    Subjective   Review of Systems: Review of Systems     I have reviewed the following portions of the patient's history:History of Present Illness and review of systems.    Past Medical History:   Past Medical History:   Diagnosis Date   • Arthritis of neck currently   • Cervical disc disorder 2021   • Diabetes mellitus (HCC) Gestational   • Frozen shoulder approx year ago   • GERD (gastroesophageal reflux disease)    • Gestational diabetes    • Hypercholesteremia    • Migraines        Past Surgical History:   Past Surgical History:   Procedure Laterality Date   • ANTERIOR CERVICAL DISCECTOMY W/ FUSION N/A 2022    Procedure: CERVICAL DISCECTOMY ANTERIOR WITH FUSION C5-6;  Surgeon: Giovany Enriquez MD;  Location: [facility];  Service: Neurosurgery;  Laterality: N/A;   •  SECTION  2010    Boy: Hitesh   •  SECTION PRIMARY  2002    Boy: [place]   • CYSTOSCOPY      X 2   • EYE SURGERY      RETINAL DETACHMENT X3   • HYSTEROSCOPY W/ POLYPECTOMY  2022    with myosure lite, D&C   • NECK SURGERY  Dec 13, 2022   • SPINAL FUSION  Dec 13, 2022       Family History:    Family History   Problem Relation Age of Onset   • Cancer Mother         Breast   • Asthma Mother    • Breast cancer Mother 71   • Vision loss Mother    • Cancer Father         Breast   • Arthritis Father    • Breast cancer Father 68   • Diabetes Father    • Hyperlipidemia Father    • Cancer Maternal Grandmother    • Diabetes Maternal Grandmother    • Ovarian cancer Maternal Grandmother    • Heart disease Maternal Grandfather    • Diabetes Paternal Grandmother    • Anxiety disorder Paternal Grandmother    • Thyroid disease Paternal Grandmother    • Coronary artery disease Paternal Grandfather    • Stroke Paternal Grandfather    • Heart disease Paternal Grandfather    • Anxiety disorder Paternal Aunt    • Depression Paternal Aunt    • Miscarriages / Stillbirths Paternal Aunt    • Arthritis Maternal Aunt    • Cancer Maternal Aunt    • Miscarriages / Stillbirths Daughter    • Colon cancer Neg Hx        Social History:   Social History     Socioeconomic History   • Marital status:    Tobacco Use   • Smoking status: Never   • Smokeless tobacco: Never   Vaping Use   • Vaping Use: Never used   Substance and Sexual Activity   • Alcohol use: No   • Drug use: No   • Sexual activity: Yes     Partners: Male     Birth control/protection: OCP       Medications:   Current Outpatient Medications:   •  acetaminophen (TYLENOL) 500 MG tablet, , Disp: , Rfl:   •  buPROPion XL (WELLBUTRIN XL) 300 MG 24 hr tablet, Take 1 tablet by mouth Daily., Disp: 90 tablet, Rfl: 3  •  cholecalciferol (VITAMIN D3) 250 MCG (07551 UT) capsule, Take  by mouth Daily. 1000 IU, Disp: , Rfl: 5  •  Cyanocobalamin (B-12 PO), Take 1,000 mcg by mouth Daily., Disp: , Rfl:   •  famotidine (PEPCID) 20 MG tablet, Take 20 mg by mouth Every Other Day., Disp: , Rfl:   •  ferrous sulfate 325 (65 FE) MG tablet, Take 325 mg by mouth Daily With Breakfast., Disp: , Rfl:   •  Ibuprofen 200 MG capsule, , Disp: , Rfl:   •  levonorgestrel-ethinyl estradiol (SEASONALE)  "0.15-0.03 MG per tablet, TAKE ONE TABLET BY MOUTH EVERY DAY, Disp: 91 tablet, Rfl: 0  •  methocarbamol (ROBAXIN) 750 MG tablet, Take 1 tablet by mouth 3 (Three) Times a Day As Needed for Muscle Spasms for up to 90 doses. This medicine will help with back pain from spasm take as needed use caution when starting medicine to ensure it does not cause drowsiness or other side effects, Disp: 90 tablet, Rfl: 0  •  ondansetron ODT (ZOFRAN-ODT) 4 MG disintegrating tablet, Place 1 tablet on the tongue Every 8 (Eight) Hours As Needed for Nausea or Vomiting., Disp: 15 tablet, Rfl: 1  •  pregabalin (LYRICA) 75 MG capsule, Take 1 capsule by mouth 2 (Two) Times a Day., Disp: 30 capsule, Rfl: 1  •  spironolactone (Aldactone) 25 MG tablet, Take 1 tablet by mouth Daily., Disp: 90 tablet, Rfl: 3  •  SUMAtriptan (IMITREX) 50 MG tablet, Take  by mouth., Disp: , Rfl:   •  Vascepa 1 g capsule capsule, Take 2 g by mouth 2 (Two) Times a Day With Meals., Disp: 120 capsule, Rfl: 0    Allergies:   Allergies   Allergen Reactions   • Sulfa Antibiotics Rash       I reviewed the patient's chief complaint, history of present illness, review of systems, past medical history, surgical history, family history, social history, medications and allergy list.     Objective    Vital Signs:   Vitals:    02/09/23 0851   BP: 115/80   Weight: 64 kg (141 lb)   Height: 167.6 cm (65.98\")     Body mass index is 22.77 kg/m².   BMI is within normal parameters. No other follow-up for BMI required.     Patient reports that she is a non-smoker and has not ever been a smoker.  This behavior was applauded and she was encouraged to continue in smoking cessation.  We will continue to monitor at subsequent visits.    Ortho Exam:  Constitutional: General Appearance: healthy-appearing, NAD, and normal body habitus.   Psychiatric: Mood and Affect: normal mood and affect and active and alert.   Cardiovascular System: Arterial Pulses Right: radial normal. Arterial Pulses Left: " radial normal.   C-Spine/Neck: Active Range of Motion: no crepitus or pain elicited on motion and flexion normal, extension normal, and rotation normal.   Shoulders: Inspection Right: no misalignment, atrophy, erythema, induration, swelling, warmth, or scapular winging. Inspection Left: no misalignment, atrophy, erythema, induration, swelling, warmth, or scapular winging and AC prominence normal. Bony Palpation Right: no tenderness of the suprasternal notch, the sternoclavicular joint, the coracoid process, the greater tuberosity, the bicipital groove, or the scapula and tenderness of the clavicle lateral one-third, the acromioclavicular joint, and the acromial. Bony Palpation Left: no tenderness of the suprasternal notch, the sternoclavicular joint, the clavicle, the coracoid process, the acromioclavicular joint, the acromial, the greater tuberosity, the bicipital groove, or the scapula. Soft Tissue Palpation Right: no tenderness of the teres minor, the subacromial bursa, the subdeltoid bursa, the axilla, the glenohumeral joint region, the pectoralis major insertion, the sternocleidomastoid, the costochondral junction, the trapezius, the rhomboid, the latissimus dorsi, the serratus, the deltoid, the levator scapulae, or the lateral cuff insertion and tenderness of the supraspinatus and the infraspinatus. Soft Tissue Palpation Left: no tenderness of the supraspinatus, the infraspinatus, the teres minor, the subacromial bursa, the subdeltoid bursa, the axilla, the glenohumeral joint region, the pectoralis major insertion, the sternocleidomastoid, the costochondral junction, the trapezius, the rhomboid, the latissimus dorsi, the serratus, the deltoid, the levator scapulae, or the lateral cuff insertion. Active Range of Motion Right: forward flexion (120 deg.), internal rotation (L5), and abduction (120 deg.). Active Range of Motion Left: normal. Special Tests Right: Hawkin's test positive, Neer's test positive,  Jeff Davis's test positive, and empty can sign positive; pain with cross arm testing. Stability Right: anterior relocation test negative, apprehension test negative, and load and shift test negative; posterior apprehension test negative and load and shift test negative; and no dislocation. Strength Right: abduction 4/5 and flexion 4/5.   Skin: Right Upper Extremity: normal. Left Upper Extremity: normal.      Results Review:   Imaging Results (Last 24 Hours)     Procedure Component Value Units Date/Time    XR Shoulder 2+ View Right [[id number]] Resulted: 02/09/23 0949     Updated: 02/09/23 0950    Narrative:      Indication: Right shoulder pain    Views:AP lateral and scapular Y views of the shoulder are submitted.    Impression: There is no fracture subluxation or dislocation. There are no   acute findings.  Mild degenerative changes present in both the   glenohumeral and AC joints.    Comparison: No additional images available for comparison review.          Procedures    Assessment / Plan    Assessment/Plan:   Diagnoses and all orders for this visit:    1. Right shoulder pain, unspecified chronicity (Primary)  -     XR Shoulder 2+ View Right    2. Osteoarthritis of right AC (acromioclavicular) joint    3. Rotator cuff impingement syndrome of right shoulder    Other orders  -     Large Joint Arthrocentesis: R subacromial bursa      2 to 3 months of right anterior shoulder pain that is worsened by motion.  She has good motion in abduction and flexion of the shoulder but this does cause pain over the AC joint.  Her radiographs show degenerative changes in the AC joint.  I am able to impinge her rotator cuff on exam.  She does have previous history of neck fusion performed 2 months ago.  Her symptoms are exacerbated by this but I do believe she has true shoulder pathology.  She has follow-up in 1 to 2 weeks with her spine surgeon.  I explained her radiographic findings and how they correlate to her symptoms on exam.  Per  surgeon recommendations, we will hold off on any anti-inflammatory medications.  She can continue Lyrica as previously prescribed.  We discussed the role of corticosteroid injection into the subacromial subdeltoid bursa for both diagnostic and therapeutic purposes.  After discussion of the risks and benefits, she like to proceed and tolerated procedure well.  See procedure note. She should have PT focus more on her shoulder in addition to the C spine work. I will see her back in 6 weeks to monitor response as well as follow-up on her recommendations from her neck surgeon.    Previous documentation reviewed: FRANCIS Hidalgo-2/3/2023-office visit.    Previous lab results reviewed: 12-2-22-hemoglobin A1c 5.0%, TSH 1.900, creatinine 0.90, EGFR 77.    Follow Up:   Return in about 6 weeks (around 3/23/2023) for Recheck.      Ross Blanton MD  [facility]

## 2023-02-20 ENCOUNTER — OFFICE VISIT (OUTPATIENT)
Dept: NEUROSURGERY | Facility: CLINIC | Age: 53
End: 2023-02-20
Payer: COMMERCIAL

## 2023-02-20 VITALS — BODY MASS INDEX: 22.28 KG/M2 | TEMPERATURE: 97.8 F | HEIGHT: 66 IN | WEIGHT: 138.6 LBS

## 2023-02-20 DIAGNOSIS — M54.12 CERVICAL RADICULOPATHY: Primary | ICD-10-CM

## 2023-02-20 DIAGNOSIS — M54.2 NECK PAIN, MUSCULOSKELETAL: ICD-10-CM

## 2023-02-20 DIAGNOSIS — M25.511 CHRONIC RIGHT SHOULDER PAIN: ICD-10-CM

## 2023-02-20 DIAGNOSIS — G89.29 CHRONIC RIGHT SHOULDER PAIN: ICD-10-CM

## 2023-02-20 PROCEDURE — 99024 POSTOP FOLLOW-UP VISIT: CPT

## 2023-02-20 NOTE — PROGRESS NOTES
"     Office Visit      Date: 2023  Patient Name: Francesca Manning  : 1970   MRN: [id number]     Chief Complaint:    Chief Complaint   Patient presents with   • S/P C5-6 ACDF     2022   • Right arm pain with tingling in fingers       History of Present Illness: Francesca Manning is a 52 y.o. female who is here today for final postoperative follow-up. 52-year-old female who is known to Dr. Enriquez service, she works at a special  and has a 2-year history of chronic neck pain that began subacutely in the right parascapular area radiates to the fourth and fifth digits, she has been through several rounds of physical therapy injections that had really significant amount of discomfort underwent physical there for an shoulder on her right side.  However, that did not resolve her pain.  She was seen in consultation by Dr. Enriquez on 10/17/2022, she underwent an uncomplicated cervical discectomy with anterior fusion C5-C6 vertebrae as performed Dr. Enriquez on 2022.  Outpatient follow-up myself on 23 demonstrates a cervical x-ray with postoperative changes from C5-C6 ACDF with no evidence of hardware malfunction.  At that time she had no other issues or complaints besides some incisional type neck pain and some occasional right arm aching.  She started on's postoperative physical therapy at that point.  Since then she has called several times complaints of incisional type neck pain with overactivity and precipitating right arm pain that started in the neck and radiate down the right shoulder into the right arm symptoms of the hand with occasional numbness and tingling of the fourth of the digits.  Chan Portillo PA-C about this complaint on 1/10/2023, encouraged not to overdo it and take 2 days with ibuprofen 800 mg 3 times daily and avoid exacerbating activities.  She called back on 2023 still experiencing some right arm \"achy/burning pain\", she was seen by myself in clinic on " 1/25/2023 I encouraged her to continue take it easy, continue with her outpatient postoperative physical therapy and to alternate Tylenol instead of ibuprofen, and only take the ibuprofen as needed to try and see if this will give us her some relief.      Her most recent visit was a telephone visit with myself on 2/3/2023 as well as Dr. Enriquez who spoke with her on the phone as well.  We called in some Lyrica 75 mg twice daily for her nerve pain and also sent in a referral to orthopedics.  She presents today with her  for final postoperative follow-up following a trial of Lyrica as well as being seen by orthopedics.  Patient states that she still does experience some right arm pain that will go down into her digits.  However, it has gotten somewhat better while being on the Lyrica as well as Tylenol for the pain.  Patient also states that she was seen by orthopedics and received a subacromial space corticosteroid injection into her right shoulder.  I had a chance to review the documentation from Dr. Blanton of orthopedics, the patient does have some right shoulder pathology, history of frozen shoulder in the past due to not using.  Per orthopedics note she may have some rotator cuff impingement as well as some degenerative changes at the AC joint.  Which I do think is attributing to her culmination of symptoms postoperative ACDF at C5-C6 for radiculopathy as well as her shoulder pathology.  Admits to occasional neck stiffness/soreness.  Denies any numbness/tingling denies any upper or lower extremity weakness, denies any signs symptoms of myelopathy.    Subjective   Review of Systems:  Review of Systems   Constitutional: Negative for activity change, appetite change, chills, diaphoresis, fatigue, fever and unexpected weight change.   HENT: Negative for congestion, dental problem, drooling, ear discharge, ear pain, facial swelling, hearing loss, mouth sores, nosebleeds, postnasal drip, rhinorrhea, sinus  pressure, sinus pain, sneezing, sore throat, tinnitus, trouble swallowing and voice change.    Eyes: Negative for photophobia, pain, discharge, redness, itching and visual disturbance.   Respiratory: Negative for apnea, cough, choking, chest tightness, shortness of breath, wheezing and stridor.    Cardiovascular: Negative for chest pain, palpitations and leg swelling.   Gastrointestinal: Negative for abdominal distention, abdominal pain, anal bleeding, blood in stool, constipation, diarrhea, nausea, rectal pain and vomiting.   Endocrine: Negative for cold intolerance, heat intolerance, polydipsia, polyphagia and polyuria.   Genitourinary: Negative for decreased urine volume, difficulty urinating, dysuria, enuresis, flank pain, frequency, genital sores, hematuria and urgency.   Musculoskeletal: Positive for arthralgias and neck pain. Negative for back pain, gait problem, joint swelling, myalgias and neck stiffness.   Skin: Negative for color change, pallor, rash and wound.   Allergic/Immunologic: Negative for environmental allergies, food allergies and immunocompromised state.   Neurological: Negative for dizziness, tremors, seizures, syncope, facial asymmetry, speech difficulty, weakness, light-headedness, numbness and headaches.   Hematological: Negative for adenopathy. Does not bruise/bleed easily.   Psychiatric/Behavioral: Negative for agitation, behavioral problems, confusion, decreased concentration, dysphoric mood, hallucinations, self-injury, sleep disturbance and suicidal ideas. The patient is not nervous/anxious and is not hyperactive.    All other systems reviewed and are negative.       Past Medical History:  Past Medical History:   Diagnosis Date   • Arthritis of neck currently   • Cervical disc disorder Feb 2021   • Diabetes mellitus (HCC) Gestational   • Frozen shoulder approx year ago   • GERD (gastroesophageal reflux disease)    • Gestational diabetes    • Hypercholesteremia    • Migraines         Past Surgical History:  Past Surgical History:   Procedure Laterality Date   • ANTERIOR CERVICAL DISCECTOMY W/ FUSION N/A 2022    Procedure: CERVICAL DISCECTOMY ANTERIOR WITH FUSION C5-6;  Surgeon: Giovany Enriquez MD;  Location: [facility];  Service: Neurosurgery;  Laterality: N/A;   •  SECTION  2010    Boy: Hitesh   •  SECTION PRIMARY  2002    Boy: [place]   • CYSTOSCOPY      X 2   • EYE SURGERY      RETINAL DETACHMENT X3   • HYSTEROSCOPY W/ POLYPECTOMY  2022    with myosure lite, D&C   • NECK SURGERY  Dec 13, 2022   • SPINAL FUSION  Dec 13, 2022       Medications    Current Outpatient Medications:   •  acetaminophen (TYLENOL) 500 MG tablet, , Disp: , Rfl:   •  buPROPion XL (WELLBUTRIN XL) 300 MG 24 hr tablet, Take 1 tablet by mouth Daily., Disp: 90 tablet, Rfl: 3  •  cholecalciferol (VITAMIN D3) 250 MCG (55693 UT) capsule, Take  by mouth Daily. 1000 IU, Disp: , Rfl: 5  •  Cyanocobalamin (B-12 PO), Take 1,000 mcg by mouth Daily., Disp: , Rfl:   •  famotidine (PEPCID) 20 MG tablet, Take 20 mg by mouth Every Other Day., Disp: , Rfl:   •  ferrous sulfate 325 (65 FE) MG tablet, Take 325 mg by mouth Daily With Breakfast., Disp: , Rfl:   •  Ibuprofen 200 MG capsule, , Disp: , Rfl:   •  levonorgestrel-ethinyl estradiol (SEASONALE) 0.15-0.03 MG per tablet, TAKE ONE TABLET BY MOUTH EVERY DAY, Disp: 91 tablet, Rfl: 0  •  methocarbamol (ROBAXIN) 750 MG tablet, Take 1 tablet by mouth 3 (Three) Times a Day As Needed for Muscle Spasms for up to 90 doses. This medicine will help with back pain from spasm take as needed use caution when starting medicine to ensure it does not cause drowsiness or other side effects, Disp: 90 tablet, Rfl: 0  •  ondansetron ODT (ZOFRAN-ODT) 4 MG disintegrating tablet, Place 1 tablet on the tongue Every 8 (Eight) Hours As Needed for Nausea or Vomiting., Disp: 15 tablet, Rfl: 1  •  pregabalin (LYRICA) 75 MG capsule, Take 1 capsule by mouth 2 (Two) Times a  Day., Disp: 30 capsule, Rfl: 1  •  spironolactone (Aldactone) 25 MG tablet, Take 1 tablet by mouth Daily., Disp: 90 tablet, Rfl: 3  •  SUMAtriptan (IMITREX) 50 MG tablet, Take  by mouth., Disp: , Rfl:   •  Vascepa 1 g capsule capsule, Take 2 g by mouth 2 (Two) Times a Day With Meals., Disp: 120 capsule, Rfl: 0    Allergies:  Allergies   Allergen Reactions   • Sulfa Antibiotics Rash       Social Hx:  Social History     Tobacco Use   • Smoking status: Never   • Smokeless tobacco: Never   Vaping Use   • Vaping Use: Never used   Substance Use Topics   • Alcohol use: No   • Drug use: No       Family Hx:  Family History   Problem Relation Age of Onset   • Cancer Mother         Breast   • Asthma Mother    • Breast cancer Mother 71   • Vision loss Mother    • Cancer Father         Breast   • Arthritis Father    • Breast cancer Father 68   • Diabetes Father    • Hyperlipidemia Father    • Cancer Maternal Grandmother    • Diabetes Maternal Grandmother    • Ovarian cancer Maternal Grandmother    • Heart disease Maternal Grandfather    • Diabetes Paternal Grandmother    • Anxiety disorder Paternal Grandmother    • Thyroid disease Paternal Grandmother    • Coronary artery disease Paternal Grandfather    • Stroke Paternal Grandfather    • Heart disease Paternal Grandfather    • Anxiety disorder Paternal Aunt    • Depression Paternal Aunt    • Miscarriages / Stillbirths Paternal Aunt    • Arthritis Maternal Aunt    • Cancer Maternal Aunt    • Miscarriages / Stillbirths Daughter    • Colon cancer Neg Hx        Objective     Vitals:    02/20/23 1508   Temp: 97.8 °F (36.6 °C)     Body mass index is 22.38 kg/m².    Physical examination:  General Appearance:  Well developed, well nourished, well groomed, alert, mildly anxious, and cooperative.  HEENT- normocephalic, atraumatic, sclera clear  Lungs-normal expansion, no wheezing  Heart-regular rate and rhythm  Extremities-positive pulses, no edema    Neurologic Exam  WDWN  A/A/C, speech  clear, attention normal, conversant, answers questions appropriately, good historian.  Cranial nerves II through XII are intact.  Motor examination does not reveal weakness in the , upper or lower extremities.   Sensation is intact.  Gait is normal, balance is normal.   No tremors are noted.  Reflexes are intact.   Webber is negative. Clonus is negative.       Review of imaging: No new imaging studies to review this visit, cervical x-ray performed on 1/04/2023 showed postoperative ACDF changes at C5-C6 with no evidence of hardware malfunction.    Assessment & Plan     1. Cervical radiculopathy  - Ambulatory Referral to Neurosurgery    2. Chronic right shoulder pain  - Ambulatory Referral to Neurosurgery    3. Neck pain, musculoskeletal  - Ambulatory Referral to Neurosurgery     Plan: 52-year-old female who is known to Dr. Enriquez service, she works at a special  and has a 2-year history of chronic neck pain that began subacutely in the right parascapular area radius of the fourth and fifth digits, she has been through several rounds of physical therapy injections that had really significant amount of discomfort underwent physical there for an shoulder on her right side.  However, that did not resolve her pain.  She was seen in consultation by Dr. Enriquez on 10/17/2022, she underwent an uncomplicated cervical discectomy with anterior fusion C5-C6 vertebrae as performed Dr. Enriquez on 12/13/2022. This patient has some chronic pain type pathology, which was discussed with her preoperatively stating that this surgery may not fix all of her chronic right arm/shoulder/neck pain.  However, the patient is still pretty early in her postoperative recovery phase.  We will encourage the patient to continue with her postoperative physical therapy continue to move her neck and arm as tolerated.  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  Continue with Lyrica 75 mg p.o. twice daily as well as Tylenol for her chronic right shoulder/arm pain.  I encouraged the Ms. Manning to continue to be patient with her current postoperative recovery stating that this type of surgery sometimes can take up to 3 to 6 months to see a full resolution in a patient's pain especially if they have chronic pain for years.  I also encouraged the patient to continue to keep her follow-up appointments with Dr. Blanton orthopedics for her chronic right shoulder pathology, their input is much appreciated.  Lastly, had a long discussion about the types of chronic pain/postoperative mental state as well as postoperative recovery and postoperative pain.  I discussed with the patient that this is a process and that she may experience some lingering pain, the patient is very anxious and concerned about her overall physical health and chronic pain.  I explained to her that chronic pain is biopsychosocial and I think that she would benefit from seeing our clinical pain psychologist Dr. Lynn Healy, patient was agreeable to this and I set up a referral to her.  I think will be important for the patient continue to manage expectations postoperatively as well as continue to give herself time to heal.  In the interim if the patient experiences any acute worsening signs or symptoms, or any signs or symptoms of cervical myelopathy she is to call our office anytime.  I think currently this will be the last postoperative visit and the patient could be seen on as-needed basis.    Bebeto Luis PA-C  [facility] [address] [address]  Fax [phone]  Phone [phone]      Answers for HPI/ROS submitted by the patient on 2/20/2023  Please describe your  symptoms.: Follow up , New pain under arm and burninh in neck.  Have you had these symptoms before?: No  How long have you been having these symptoms?: Greater than 2 weeks  Please list any medications you are currently taking for this condition.: Lyrica was for nerve pain. Not sure if that’s supposed to help with this or not.  Please describe any probable cause for these symptoms. : Previous surgery  What is the primary reason for your visit?: Other

## 2023-02-24 ENCOUNTER — TELEPHONE (OUTPATIENT)
Dept: NEUROSURGERY | Facility: CLINIC | Age: 53
End: 2023-02-24
Payer: COMMERCIAL

## 2023-02-24 DIAGNOSIS — M54.12 CERVICAL RADICULOPATHY AT C6: ICD-10-CM

## 2023-02-24 DIAGNOSIS — M54.12 CERVICAL RADICULOPATHY: ICD-10-CM

## 2023-02-24 RX ORDER — PREGABALIN 100 MG/1
100 CAPSULE ORAL 2 TIMES DAILY
Qty: 60 CAPSULE | Refills: 1 | Status: SHIPPED | OUTPATIENT
Start: 2023-02-24 | End: 2023-03-28

## 2023-02-24 NOTE — TELEPHONE ENCOUNTER
----- Message from Francesca Manning sent at 2/24/2023 10:52 AM EST -----  Regarding: Note for work  Contact: [phone]  Work is asking for a note regarding work.  Can someone send me a statement about returning to work in two weeks as tolerated?

## 2023-02-24 NOTE — TELEPHONE ENCOUNTER
Caller: Francesca Manning    Relationship: Self    Best call back number: [phone]    Who are you requesting to speak with (clinical staff, provider,  specific staff member): CLINICAL    What was the call regarding: MELY    Do you require a callback: YES    PATIENT CALLED AND IS REQUESTING TO UP HER LYRICA'S DOSIS - PLEASE CALL PATIENT WITH ADVICE.  THANK YOU!

## 2023-02-24 NOTE — TELEPHONE ENCOUNTER
I have called the patient and ask for the fax number where I could send the signed letter by Bebeto to.  She said that she had already sent in the one from her My Chart and they were ok with this.  She also thanked us for calling in a stronger dose for Lyrica.  She will call the office for worsening pain if needed.  She was thankful for the call back.

## 2023-03-02 ENCOUNTER — OFFICE VISIT (OUTPATIENT)
Dept: PSYCHIATRY | Facility: CLINIC | Age: 53
End: 2023-03-02
Payer: COMMERCIAL

## 2023-03-02 ENCOUNTER — TELEPHONE (OUTPATIENT)
Dept: FAMILY MEDICINE CLINIC | Facility: CLINIC | Age: 53
End: 2023-03-02

## 2023-03-02 DIAGNOSIS — F43.9 TRAUMA AND STRESSOR-RELATED DISORDER: ICD-10-CM

## 2023-03-02 DIAGNOSIS — F41.9 ANXIETY DISORDER, UNSPECIFIED TYPE: Primary | ICD-10-CM

## 2023-03-02 DIAGNOSIS — M54.12 CERVICAL RADICULOPATHY: Primary | ICD-10-CM

## 2023-03-02 DIAGNOSIS — F41.9 ANXIETY: ICD-10-CM

## 2023-03-02 PROCEDURE — 90791 PSYCH DIAGNOSTIC EVALUATION: CPT | Performed by: STUDENT IN AN ORGANIZED HEALTH CARE EDUCATION/TRAINING PROGRAM

## 2023-03-02 RX ORDER — DULOXETIN HYDROCHLORIDE 30 MG/1
30 CAPSULE, DELAYED RELEASE ORAL DAILY
Qty: 30 CAPSULE | Refills: 1 | Status: SHIPPED | OUTPATIENT
Start: 2023-03-02

## 2023-03-02 NOTE — TELEPHONE ENCOUNTER
Caller: Francesca Manning    Relationship to patient: Self    Best call back number: [phone]    Patient is needing: PATIENT CALLED TO SCHEDULE REGARDING HAIR LOSS.      NO APPOINTMENTS AVAILABLE

## 2023-03-03 ENCOUNTER — TELEPHONE (OUTPATIENT)
Dept: NEUROSURGERY | Facility: CLINIC | Age: 53
End: 2023-03-03

## 2023-03-03 ENCOUNTER — TELEMEDICINE (OUTPATIENT)
Dept: FAMILY MEDICINE CLINIC | Facility: CLINIC | Age: 53
End: 2023-03-03
Payer: COMMERCIAL

## 2023-03-03 VITALS — HEIGHT: 66 IN | BODY MASS INDEX: 22.18 KG/M2 | WEIGHT: 138 LBS

## 2023-03-03 DIAGNOSIS — M54.12 CERVICAL RADICULOPATHY: Primary | ICD-10-CM

## 2023-03-03 DIAGNOSIS — L65.9 LOSS OF HAIR: ICD-10-CM

## 2023-03-03 DIAGNOSIS — Z98.890 STATUS POST CERVICAL DISCECTOMY: ICD-10-CM

## 2023-03-03 DIAGNOSIS — R09.89 THROAT FULLNESS: ICD-10-CM

## 2023-03-03 PROCEDURE — 99214 OFFICE O/P EST MOD 30 MIN: CPT | Performed by: PHYSICIAN ASSISTANT

## 2023-03-03 NOTE — PROGRESS NOTES
"Chief Complaint  ENT referral and Neck Pain    Subjective         Francesca Manning presents to [facility]  History of Present Illness  Patient reports today to discuss several issues.    Patient reports haing Cervical discectomy and anterior fusion of C5/6 in December.  Patient states since the date she has had a different presentation of pain.  Patient would like to disucss medications she has been provided for pain Lyrica and now Cymbalta.    Patient believes Lyrica is contributing to hair loss  Patient states Neurosurg would like to know if her thyroid has been checked recently.  Patient would also like to know if spironalactone should be adjusted.    Patient reports since surgery she has had a fullness sensation or lump in her throat.  She is concerned that it is from surgery and should have corrected by now.  Request referral to ENT  Neck Pain       Objective   Vital Signs:   Ht 167.6 cm (65.98\")   Wt 62.6 kg (138 lb)   BMI 22.29 kg/m²     Estimated body mass index is 22.29 kg/m² as calculated from the following:    Height as of this encounter: 167.6 cm (65.98\").    Weight as of this encounter: 62.6 kg (138 lb).     Physical Exam   Pulmonary/Chest: Effort normal.   Psychiatric: She has a normal mood and affect.     Result Review :                 Assessment and Plan    Diagnoses and all orders for this visit:    1. Cervical radiculopathy (Primary)  Assessment & Plan:  Patient will continue follow up and medication adjustments with Neurosurg regarding post surgical pain and chronic radiculopathy      2. Loss of hair  Assessment & Plan:  Advised patient TSH was checked in December and WNLs.  Once her medications are settled will consider adjusting spironolactone, patient acknowledged understanding      3. Throat fullness  Assessment & Plan:  Provided patient with contact info to ENT for further follow up      4. Status post cervical discectomy  Assessment & Plan:  See plan " above        Follow Up   No follow-ups on file.  Patient was given instructions and counseling regarding her condition or for health maintenance advice. Please see specific information pulled into the AVS if appropriate.     Mode of Visit: Video  Location of patient: home  Location of provider: home  You have chosen to receive care through a telehealth visit.  The patient has signed the video visit consent form.  The visit included audio and video interaction. No technical issues occurred during this visit.

## 2023-03-03 NOTE — ASSESSMENT & PLAN NOTE
Advised patient TSH was checked in December and WNLs.  Once her medications are settled will consider adjusting spironolactone, patient acknowledged understanding

## 2023-03-03 NOTE — ASSESSMENT & PLAN NOTE
Patient will continue follow up and medication adjustments with Neurosurg regarding post surgical pain and chronic radiculopathy

## 2023-03-03 NOTE — TELEPHONE ENCOUNTER
"    Caller: Francesca Manning    Relationship to patient: Self    Best call back number: [phone]    Patient is needing:       PATIENT WOULD LIKE A CALL BACK  REGARDING TWO SEPARATE ISSUES:    PATIENT WAS ADVISED TO CUT LYRICA DOSAGE AMOUNTS, HOWEVER THE PATIENT ONLY HAS 75MG CAPSULES.      ALSO, PATIENT WOULD LIKE TO BE SEEN SOONER-- \"HE TOLD ME TO RETURN IN 6 MONTHS FROM THE SURGERY, BUT I NEED TO COME IN SOONER\"          "

## 2023-03-06 NOTE — TELEPHONE ENCOUNTER
"Caller: Francesca Manning    Relationship to patient: Self    Best call back number: [phone]    Patient is needing: PATIENT CALLED TO CHECK ON STATUS OF:    PATIENT WAS ADVISED TO CUT LYRICA DOSAGE AMOUNTS, HOWEVER THE PATIENT ONLY HAS 75MG CAPSULES.       ALSO, PATIENT WOULD LIKE TO BE SEEN SOONER-- \"HE TOLD ME TO RETURN IN 6 MONTHS FROM THE SURGERY, BUT I NEED TO COME IN SOONER\"      PLEASE CALL PATIENT WHEN ABLE TO ADVISE.    THANK YOU        "

## 2023-03-07 ENCOUNTER — TELEPHONE (OUTPATIENT)
Dept: NEUROSURGERY | Facility: CLINIC | Age: 53
End: 2023-03-07
Payer: COMMERCIAL

## 2023-03-07 DIAGNOSIS — Z98.890 STATUS POST CERVICAL DISCECTOMY: ICD-10-CM

## 2023-03-07 DIAGNOSIS — M25.511 CHRONIC RIGHT SHOULDER PAIN: Primary | ICD-10-CM

## 2023-03-07 DIAGNOSIS — M79.601 PAIN IN RIGHT ARM: ICD-10-CM

## 2023-03-07 DIAGNOSIS — M54.2 NECK PAIN, MUSCULOSKELETAL: ICD-10-CM

## 2023-03-07 DIAGNOSIS — G89.29 CHRONIC RIGHT SHOULDER PAIN: Primary | ICD-10-CM

## 2023-03-07 NOTE — TELEPHONE ENCOUNTER
I spoke to Ms. Manning on the phone today regarding her most recent call to our office.  I gave her further instructions on how to taper her Lyrica dose.  Patient is currently on Lyrica 75 mg twice daily, was on Lyrica 100 mg twice daily.  I instructed her to continue with Lyrica 75 mg twice daily for 1 more day, seeing that she has been tapered down to this dose from her 100 mg dose this is the second day she has been on the 75 mg.  I then encouraged her to go down to 75 mg Lyrica once a day for 3 to 4 days.  It would be then where she can come off the Lyrica altogether.  The patient is complaining of some side effects (hair loss) from Lyrica, however does endorse good chronic pain relief while on Lyrica.  Had a long discussion with the patient about the nature of her chronic pain postsurgically.  She is on Cymbalta 30 mg once daily, as well as Tylenol 500 mg as needed for pain.  Patient will continue to follow-up with orthopedics regarding her right shoulder pathology.  I will put in a referral to pain management for her chronic pain.  We can see the patient back in 3 months time if she wishes. At that time she will be 6 months postop. I did encourage her to continue to give her post operative recovery some more time.  Again, I reiterated that some patients after undergoing a 1 level ACDF take up to 6 months to a full year to experience full relief of their chronic pain, especially if they have had chronic pain for several years preoperatively.  Patient was thankful for the call back.  She will follow-up with her PCP, orthopedics, clinical pain psychologist (Dr. Healy) and pain management regarding chronic right arm/shoulder pain.

## 2023-03-15 ENCOUNTER — TELEPHONE (OUTPATIENT)
Dept: NEUROSURGERY | Facility: CLINIC | Age: 53
End: 2023-03-15
Payer: COMMERCIAL

## 2023-03-15 RX ORDER — FAMOTIDINE 20 MG/1
TABLET, FILM COATED ORAL
Qty: 30 TABLET | Refills: 1 | Status: SHIPPED | OUTPATIENT
Start: 2023-03-15
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
## 2023-03-28 ENCOUNTER — OFFICE VISIT (OUTPATIENT)
Dept: ORTHOPEDIC SURGERY | Facility: CLINIC | Age: 53
End: 2023-03-28
Payer: COMMERCIAL

## 2023-03-28 VITALS
WEIGHT: 138 LBS | DIASTOLIC BLOOD PRESSURE: 73 MMHG | BODY MASS INDEX: 22.18 KG/M2 | HEIGHT: 66 IN | SYSTOLIC BLOOD PRESSURE: 110 MMHG

## 2023-03-28 DIAGNOSIS — M75.41 ROTATOR CUFF IMPINGEMENT SYNDROME OF RIGHT SHOULDER: Primary | ICD-10-CM

## 2023-03-28 DIAGNOSIS — M19.011 OSTEOARTHRITIS OF RIGHT AC (ACROMIOCLAVICULAR) JOINT: ICD-10-CM

## 2023-03-28 NOTE — PROGRESS NOTES
[facility] Up Visit     Office Follow Up      Date: 03/28/2023   Patient Name: Francesca Manning  MRN: [id number]  YOB: 1970    Referring Physician: No ref. provider found     Chief Complaint: No chief complaint on file.      History of Present Illness: Francesca Manning is a 52 y.o. female who is here today for follow up on right shoulder pain from AC joint arthritis and rotator cuff impingement.  At last visit, we injected her subacromial bursa with corticosteroid.  She reports interval improvement in her symptoms.  Her pain is now 0/10.  She has been going to physical therapy.  Does have persistent cervical spine symptoms and is being followed by interventional pain management as well as neurosurgery.  Has follow-up next week.    Subjective   Review of Systems: Review of Systems   Constitutional: Negative.  Negative for chills, fever, unexpected weight gain and unexpected weight loss.   HENT: Negative.  Negative for congestion, postnasal drip and rhinorrhea.    Eyes: Negative.  Negative for blurred vision.   Respiratory: Negative.  Negative for shortness of breath.    Cardiovascular: Negative.  Negative for leg swelling.   Gastrointestinal: Negative.  Negative for abdominal pain, nausea and vomiting.   Endocrine: Negative.    Genitourinary: Negative.  Negative for difficulty urinating.   Musculoskeletal: Positive for arthralgias. Negative for gait problem, joint swelling and myalgias.   Skin: Negative for skin lesions and wound.   Neurological: Negative for dizziness, weakness, light-headedness and numbness.   Hematological: Negative.  Does not bruise/bleed easily.   Psychiatric/Behavioral: Negative.  Negative for depressed mood.   All other systems reviewed and are negative.       Medications:   Current Outpatient Medications:   •  acetaminophen (TYLENOL) 500 MG tablet, , Disp: , Rfl:   •  buPROPion XL (WELLBUTRIN XL) 300 MG 24 hr  tablet, Take 1 tablet by mouth Daily., Disp: 90 tablet, Rfl: 3  •  cholecalciferol (VITAMIN D3) 250 MCG (09520 UT) capsule, Take  by mouth Daily. 1000 IU, Disp: , Rfl: 5  •  Cyanocobalamin (B-12 PO), Take 1,000 mcg by mouth Daily., Disp: , Rfl:   •  DULoxetine (Cymbalta) 30 MG capsule, Take 1 capsule by mouth Daily., Disp: 30 capsule, Rfl: 1  •  famotidine (PEPCID) 20 MG tablet, TAKE ONE TABLET BY MOUTH NIGHTLY AT BEDTIME AS NEEDED FOR HEARTBURN, Disp: 30 tablet, Rfl: 1  •  ferrous sulfate 325 (65 FE) MG tablet, Take 1 tablet by mouth Daily With Breakfast., Disp: , Rfl:   •  Ibuprofen 200 MG capsule, , Disp: , Rfl:   •  levonorgestrel-ethinyl estradiol (SEASONALE) 0.15-0.03 MG per tablet, TAKE ONE TABLET BY MOUTH EVERY DAY, Disp: 91 tablet, Rfl: 0  •  methocarbamol (ROBAXIN) 750 MG tablet, Take 1 tablet by mouth 3 (Three) Times a Day As Needed for Muscle Spasms for up to 90 doses. This medicine will help with back pain from spasm take as needed use caution when starting medicine to ensure it does not cause drowsiness or other side effects, Disp: 90 tablet, Rfl: 0  •  ondansetron ODT (ZOFRAN-ODT) 4 MG disintegrating tablet, Place 1 tablet on the tongue Every 8 (Eight) Hours As Needed for Nausea or Vomiting., Disp: 15 tablet, Rfl: 1  •  spironolactone (Aldactone) 25 MG tablet, Take 1 tablet by mouth Daily., Disp: 90 tablet, Rfl: 3  •  SUMAtriptan (IMITREX) 50 MG tablet, Take  by mouth., Disp: , Rfl:   •  Vascepa 1 g capsule capsule, Take 2 g by mouth 2 (Two) Times a Day With Meals., Disp: 120 capsule, Rfl: 0    Allergies:   Allergies   Allergen Reactions   • Sulfa Antibiotics Rash       I have reviewed and updated the patient's chief complaint, history of present illness, review of systems, past medical history, surgical history, family history, social history, medications and allergy list as appropriate.     Objective    Vital Signs:   Vitals:    03/28/23 0820   BP: 110/73   Weight: 62.6 kg (138 lb)   Height: 167.6  "cm (65.98\")     Body mass index is 22.28 kg/m².  BMI is within normal parameters. No other follow-up for BMI required.    Patient reports that she is a non-smoker and has not ever been a smoker.  This behavior was applauded and she was encouraged to continue in smoking cessation.  We will continue to monitor at subsequent visits.    Ortho Exam:  General: Well-appearing, no acute distress  Right shoulder: No erythema ecchymosis or swelling.  She is tender in her axilla.  No tenderness at the anterior lateral shoulder today.  Full range of motion in flexion extension abduction and internal rotation.  Negative Neer's Galaviz and speeds test.  Sensation intact light touch.  2+ radial pulse.    Results Review:   Imaging Results (Last 24 Hours)     ** No results found for the last 24 hours. **          Procedures    Assessment / Plan    Assessment/Plan:   Diagnoses and all orders for this visit:    1. Rotator cuff impingement syndrome of right shoulder (Primary)    2. Osteoarthritis of right AC (acromioclavicular) joint      Follow-up on her right shoulder pain from AC joint arthritis and rotator cuff tendinitis.  Received subacromial corticosteroid injection at last visit.  Is also been in physical therapy at [facility].  Overall, symptoms greatly improved.  She has full range of motion and strength without any pain.  She has been switched to Cymbalta for her ongoing neck issues.  She is scheduled to follow with pain management after cervical spine MRI next month as well as see her neurosurgeon in [place].  Current pain in the axilla is not a typical presentation of shoulder pain and I believe more likely from the cervical spine at this point.  I would have her follow-up with her other specialist.  I am happy to see her back at any time for any issue as it relates to the shoulder or any other orthopedic joint.  However, at this time we will make her follow-up as needed.    Follow Up:   Return if symptoms worsen " or fail to improve.      Ross Blanton MD  [facility]

## 2023-03-29 RX ORDER — LEVONORGESTREL AND ETHINYL ESTRADIOL 0.15-0.03
KIT ORAL
Qty: 91 TABLET | Refills: 0 | Status: SHIPPED | OUTPATIENT
Start: 2023-03-29

## 2023-03-31 ENCOUNTER — TELEPHONE (OUTPATIENT)
Dept: NEUROSURGERY | Facility: CLINIC | Age: 53
End: 2023-03-31
Payer: COMMERCIAL

## 2023-03-31 NOTE — TELEPHONE ENCOUNTER
Spoke to PT today about rescheduling her appointment to a day when  is in the office.She informed me that she is having an MRI on 04/18/23 that  ordered and she returns to see  on 04/28/23.I advised that I will talk with Chan Garay and  to see how the want to schedule her next Appt

## 2023-04-13 DIAGNOSIS — L65.9 LOSS OF HAIR: Primary | ICD-10-CM

## 2023-04-13 RX ORDER — SPIRONOLACTONE 50 MG/1
50 TABLET, FILM COATED ORAL DAILY
Qty: 90 TABLET | Refills: 0 | Status: SHIPPED | OUTPATIENT
Start: 2023-04-13 | End: 2023-04-20

## 2023-04-20 ENCOUNTER — OFFICE VISIT (OUTPATIENT)
Dept: NEUROSURGERY | Facility: CLINIC | Age: 53
End: 2023-04-20
Payer: COMMERCIAL

## 2023-04-20 VITALS
WEIGHT: 144.6 LBS | SYSTOLIC BLOOD PRESSURE: 124 MMHG | BODY MASS INDEX: 23.24 KG/M2 | TEMPERATURE: 97.3 F | DIASTOLIC BLOOD PRESSURE: 80 MMHG | HEIGHT: 66 IN

## 2023-04-20 DIAGNOSIS — M54.12 CERVICAL RADICULOPATHY: ICD-10-CM

## 2023-04-20 DIAGNOSIS — Z98.890 STATUS POST CERVICAL DISCECTOMY: Primary | ICD-10-CM

## 2023-04-20 DIAGNOSIS — M79.601 PAIN IN RIGHT ARM: ICD-10-CM

## 2023-04-20 RX ORDER — OMEPRAZOLE 40 MG/1
40 CAPSULE, DELAYED RELEASE ORAL DAILY
COMMUNITY

## 2023-04-20 RX ORDER — SPIRONOLACTONE 25 MG/1
25 TABLET ORAL DAILY
COMMUNITY

## 2023-04-20 NOTE — PROGRESS NOTES
NAME: BRIAN GARCIA   DOS: 2023  : 1970  PCP: Dominique Blunt PA-C    Chief Complaint:    Chief Complaint   Patient presents with   • Arm Pain     RUE       History of Present Illness:  52 y.o. female   King 52-year-old female in neurosurgical consultation she is well-known to me for history of C5-6 radiculopathy with right parascapular pain and right upper extremity pain she underwent uncomplicated anterior cervical discectomy and fusion and is here for follow-up she is still reporting right upper extremity pain she is recently seen Lynn in our office and has seen Bebeto randle PA a couple of times technically everything went great with surgery she is not where she would like to be    She denies new pains from the surgery if she has some limited range of motion she denies any bowel bladder incontinence issues or new neurologic deficits she is currently on Cymbalta that helps a lot with the chronic pain    PMHX  Allergies:  Allergies   Allergen Reactions   • Sulfa Antibiotics Rash     Medications    Current Outpatient Medications:   •  acetaminophen (TYLENOL) 500 MG tablet, , Disp: , Rfl:   •  cholecalciferol (VITAMIN D3) 250 MCG (33605 UT) capsule, Take  by mouth Daily. 1000 IU, Disp: , Rfl: 5  •  Cyanocobalamin (B-12 PO), Take 1,000 mcg by mouth Daily., Disp: , Rfl:   •  DULoxetine (Cymbalta) 30 MG capsule, Take 1 capsule by mouth Daily., Disp: 30 capsule, Rfl: 1  •  famotidine (PEPCID) 20 MG tablet, TAKE ONE TABLET BY MOUTH NIGHTLY AT BEDTIME AS NEEDED FOR HEARTBURN, Disp: 30 tablet, Rfl: 1  •  ferrous sulfate 325 (65 FE) MG tablet, Take 1 tablet by mouth Daily With Breakfast., Disp: , Rfl:   •  Ibuprofen 200 MG capsule, , Disp: , Rfl:   •  levonorgestrel-ethinyl estradiol (SEASONALE) 0.15-0.03 MG per tablet, TAKE ONE TABLET BY MOUTH EVERY DAY, Disp: 91 tablet, Rfl: 0  •  methocarbamol (ROBAXIN) 750 MG tablet, Take 1 tablet by mouth 3 (Three) Times a Day As Needed for Muscle Spasms for  up to 90 doses. This medicine will help with back pain from spasm take as needed use caution when starting medicine to ensure it does not cause drowsiness or other side effects, Disp: 90 tablet, Rfl: 0  •  omeprazole (priLOSEC) 40 MG capsule, Take 1 capsule by mouth Daily., Disp: , Rfl:   •  ondansetron ODT (ZOFRAN-ODT) 4 MG disintegrating tablet, Place 1 tablet on the tongue Every 8 (Eight) Hours As Needed for Nausea or Vomiting., Disp: 15 tablet, Rfl: 1  •  spironolactone (ALDACTONE) 25 MG tablet, Take 1 tablet by mouth Daily., Disp: , Rfl:   •  SUMAtriptan (IMITREX) 50 MG tablet, Take  by mouth., Disp: , Rfl:   •  Vascepa 1 g capsule capsule, Take 2 g by mouth 2 (Two) Times a Day With Meals., Disp: 120 capsule, Rfl: 0  Past Medical History:  Past Medical History:   Diagnosis Date   • Arthritis    • Arthritis of neck currently   • Cervical disc disorder 2021   • Diabetes mellitus Gestational   • Frozen shoulder approx year ago   • GERD (gastroesophageal reflux disease)    • Gestational diabetes    • Hypercholesteremia    • Migraines      Past Surgical History:  Past Surgical History:   Procedure Laterality Date   • ANTERIOR CERVICAL DISCECTOMY W/ FUSION N/A 2022    Procedure: CERVICAL DISCECTOMY ANTERIOR WITH FUSION C5-6;  Surgeon: Giovany Enriquez MD;  Location: [facility];  Service: Neurosurgery;  Laterality: N/A;   •  SECTION  2010    Boy: Hitesh   •  SECTION PRIMARY  2002    Boy: [place]   • CYSTOSCOPY      X 2   • EYE SURGERY      RETINAL DETACHMENT X3   • HYSTEROSCOPY W/ POLYPECTOMY  2022    with myosure lite, D&C   • NECK SURGERY  Dec 13, 2022   • SPINAL FUSION  Dec 13, 2022     Social Hx:  Social History     Tobacco Use   • Smoking status: Never   • Smokeless tobacco: Never   Vaping Use   • Vaping Use: Never used   Substance Use Topics   • Alcohol use: No   • Drug use: No     Family Hx:  Family History   Problem Relation Age of Onset   • Cancer Mother          Breast   • Asthma Mother    • Breast cancer Mother 71   • Vision loss Mother    • Cancer Father         Breast   • Arthritis Father    • Breast cancer Father 68   • Diabetes Father    • Hyperlipidemia Father    • Cancer Maternal Grandmother    • Diabetes Maternal Grandmother    • Ovarian cancer Maternal Grandmother    • Heart disease Maternal Grandfather    • Diabetes Paternal Grandmother    • Anxiety disorder Paternal Grandmother    • Thyroid disease Paternal Grandmother    • Coronary artery disease Paternal Grandfather    • Stroke Paternal Grandfather    • Heart disease Paternal Grandfather    • Anxiety disorder Paternal Aunt    • Depression Paternal Aunt    • Miscarriages / Stillbirths Paternal Aunt    • Arthritis Maternal Aunt    • Cancer Maternal Aunt    • Miscarriages / Stillbirths Daughter    • Colon cancer Neg Hx      Review of Systems:        Review of Systems   Constitutional: Negative.    HENT: Negative.    Eyes: Negative.    Respiratory: Negative.    Cardiovascular: Negative.    Gastrointestinal: Negative.    Endocrine: Negative.    Genitourinary: Negative.    Musculoskeletal: Negative.    Skin: Negative.    Allergic/Immunologic: Negative.    Neurological: Negative.    Hematological: Negative.    Psychiatric/Behavioral: Negative.    All other systems reviewed and are negative.       I have reviewed this note template and all pertinent parts of the review of systems social, family history, surgical history and medication list  Physical Examination:  Vitals:    04/20/23 1019   BP: 124/80   Temp: 97.3 °F (36.3 °C)      General Appearance:   Well developed, well nourished, well groomed, alert, and cooperative.  Neurological examination:  Neurologic Exam    She is wide awake alert and follows commands    Her cranial nerves are grossly intact there is no Richelle's her voice is strong    Neck is supple incisions pristine    She has good strength in all her extremities she still has a trace right-sided C6  radicular exam she has potentially some tenderness at the wrist and elbow    Gait and station are normal there is no evidence of long track findings  Review of Imaging/DATA:  MRI report reviewed result personally reviewed and shows no evidence of neuroforaminal stenosis    X-rays previously been reviewed that demonstrate I looked at that showed excellent instrumentation purchase  Diagnoses/Plan:    Ms. Manning is a 52 y.o. female   1.  Status post ACDF-she admits to actually improvement of the proximal supraspinatus pain which is likely a result of her C5-6 disc rupture she was miserable objectively at her initial visit    2.  Persistent multifactorial show shoulder pain-she has a history of frozen shoulder and arm pain    Plan  Physical therapy  Lynn will assist with some of the social stressors and psychological stressors of chronic pain management  Check an EMG nerve conduction study of the upper extremity I suspect it will show some chronic overtones of C6 radiculopathy however I am really getting it to look for carpal tunnel ulnar nerve    Follow-up cervical flexion-extension film    While she is had a disappointing outcome from her standpoint from a neurosurgical standpoint there has been some focal improvement in the proximal proximal supraspinatus pain that she presented with an overall she does not appear objectively miserable as she did when I initially saw her    I will see her in follow-up and then we will turn her care over for chronic pain management

## 2023-04-20 NOTE — PATIENT INSTRUCTIONS
* Get most recent MRI / XR on Disc from [facility] *  - EMG - Right Arm  - XR - Cervical Flexion / Extension     FU w/ Mina after.

## 2023-04-28 ENCOUNTER — TELEPHONE (OUTPATIENT)
Dept: FAMILY MEDICINE CLINIC | Facility: CLINIC | Age: 53
End: 2023-04-28

## 2023-04-28 NOTE — TELEPHONE ENCOUNTER
Caller: Francesca Manning    Relationship: Self    Best call back number: [phone]    Requested Prescriptions:         DULoxetine (Cymbalta) 30 MG capsule         Pharmacy where request should be sent: [place] [place] - [address] - [phone] [facility] [phone] FX     Last office visit with prescribing clinician: 11/29/2022   Last telemedicine visit with prescribing clinician: 5/18/2023   Next office visit with prescribing clinician: 5/18/2023     Additional details provided by patient: PATIENT STATED HER NEUROSURGEON AND PAIN MANAGEMENT SUGGESTED PATIENT REQUEST HER PRIMARY PROVIDER TO REFILL HER PRESCRIPTION.    PATIENT STATED SHE HAS 7 DAYS LEFT.      REQUESTS CALL BACK IF NICKO CANNOT REFILL    Does the patient have less than a 3 day supply:  [] Yes  [x] No    Would you like a call back once the refill request has been completed: [x] Yes [] No    If the office needs to give you a call back, can they leave a voicemail: [x] Yes [] No    Sourav Epps Rep   04/28/23 12:58 EDT

## 2023-05-01 RX ORDER — DULOXETIN HYDROCHLORIDE 30 MG/1
30 CAPSULE, DELAYED RELEASE ORAL DAILY
Qty: 30 CAPSULE | Refills: 1 | Status: SHIPPED | OUTPATIENT
Start: 2023-05-01

## 2023-05-01 NOTE — TELEPHONE ENCOUNTER
Provider:  Mina  Surgery/Procedure:  CERVICAL DISCECTOMY ANTERIOR WITH FUSION C5-6  Surgery/Procedure Date:  12-  Last visit:  4-   Next visit: 5-     Reason for call: pharmacy has faxed over a refill request for Duloxetine 30 MG.  Please Advise. Thank you.    Requested Prescriptions     Pending Prescriptions Disp Refills   • DULoxetine (Cymbalta) 30 MG capsule 30 capsule 1     Sig: Take 1 capsule by mouth Daily.

## 2023-05-11 ENCOUNTER — HOSPITAL ENCOUNTER (OUTPATIENT)
Dept: GENERAL RADIOLOGY | Facility: HOSPITAL | Age: 53
Discharge: HOME | End: 2023-05-11
Admitting: NEUROLOGICAL SURGERY
Payer: COMMERCIAL

## 2023-05-11 ENCOUNTER — OFFICE VISIT (OUTPATIENT)
Dept: NEUROSURGERY | Facility: CLINIC | Age: 53
End: 2023-05-11
Payer: COMMERCIAL

## 2023-05-11 VITALS
SYSTOLIC BLOOD PRESSURE: 120 MMHG | WEIGHT: 151.6 LBS | DIASTOLIC BLOOD PRESSURE: 80 MMHG | BODY MASS INDEX: 24.36 KG/M2 | HEIGHT: 66 IN | TEMPERATURE: 97.6 F

## 2023-05-11 DIAGNOSIS — Z98.890 STATUS POST CERVICAL DISCECTOMY: ICD-10-CM

## 2023-05-11 DIAGNOSIS — M54.12 CERVICAL RADICULOPATHY: ICD-10-CM

## 2023-05-11 DIAGNOSIS — M79.601 PAIN IN RIGHT ARM: ICD-10-CM

## 2023-05-11 DIAGNOSIS — M47.812 CERVICAL ARTHRITIS: ICD-10-CM

## 2023-05-11 DIAGNOSIS — Z98.890 STATUS POST CERVICAL DISCECTOMY: Primary | ICD-10-CM

## 2023-05-11 DIAGNOSIS — M54.12 CERVICAL RADICULOPATHY AT C6: ICD-10-CM

## 2023-05-11 PROCEDURE — 72040 X-RAY EXAM NECK SPINE 2-3 VW: CPT

## 2023-05-11 RX ORDER — SPIRONOLACTONE 50 MG/1
1 TABLET, FILM COATED ORAL DAILY
COMMUNITY
Start: 2023-04-25

## 2023-05-11 NOTE — PROGRESS NOTES
NAME: BRIAN GARCIA   DOS: 2023  : 1970  PCP: Dominique Blunt PA-C    Chief Complaint:    Chief Complaint   Patient presents with   • status post cervical discectomy        History of Present Illness:  52 y.o. female   52-year-old follow-up cervical discectomy EMG nerve conduction study x-rays stable symptomatology still with intermittent pain right arm    PMHX  Allergies:  Allergies   Allergen Reactions   • Sulfa Antibiotics Rash     Medications    Current Outpatient Medications:   •  acetaminophen (TYLENOL) 500 MG tablet, , Disp: , Rfl:   •  cholecalciferol (VITAMIN D3) 250 MCG (01858 UT) capsule, Take  by mouth Daily. 1000 IU, Disp: , Rfl: 5  •  Cyanocobalamin (B-12 PO), Take 1,000 mcg by mouth Daily., Disp: , Rfl:   •  DULoxetine (Cymbalta) 30 MG capsule, Take 1 capsule by mouth Daily., Disp: 30 capsule, Rfl: 1  •  famotidine (PEPCID) 20 MG tablet, TAKE ONE TABLET BY MOUTH NIGHTLY AT BEDTIME AS NEEDED FOR HEARTBURN, Disp: 30 tablet, Rfl: 1  •  ferrous sulfate 325 (65 FE) MG tablet, Take 1 tablet by mouth Daily With Breakfast., Disp: , Rfl:   •  Ibuprofen 200 MG capsule, , Disp: , Rfl:   •  levonorgestrel-ethinyl estradiol (SEASONALE) 0.15-0.03 MG per tablet, TAKE ONE TABLET BY MOUTH EVERY DAY, Disp: 91 tablet, Rfl: 0  •  methocarbamol (ROBAXIN) 750 MG tablet, Take 1 tablet by mouth 3 (Three) Times a Day As Needed for Muscle Spasms for up to 90 doses. This medicine will help with back pain from spasm take as needed use caution when starting medicine to ensure it does not cause drowsiness or other side effects, Disp: 90 tablet, Rfl: 0  •  omeprazole (priLOSEC) 40 MG capsule, Take 1 capsule by mouth Daily., Disp: , Rfl:   •  ondansetron ODT (ZOFRAN-ODT) 4 MG disintegrating tablet, Place 1 tablet on the tongue Every 8 (Eight) Hours As Needed for Nausea or Vomiting., Disp: 15 tablet, Rfl: 1  •  spironolactone (ALDACTONE) 50 MG tablet, Take 1 tablet by mouth Daily., Disp: , Rfl:   •  SUMAtriptan  (IMITREX) 50 MG tablet, Take  by mouth., Disp: , Rfl:   •  Vascepa 1 g capsule capsule, Take 2 g by mouth 2 (Two) Times a Day With Meals., Disp: 120 capsule, Rfl: 0  Past Medical History:  Past Medical History:   Diagnosis Date   • Arthritis    • Arthritis of neck currently   • Cervical disc disorder 2021   • Diabetes mellitus Gestational   • Frozen shoulder approx year ago   • GERD (gastroesophageal reflux disease)    • Gestational diabetes    • Hypercholesteremia    • Migraines      Past Surgical History:  Past Surgical History:   Procedure Laterality Date   • ANTERIOR CERVICAL DISCECTOMY W/ FUSION N/A 2022    Procedure: CERVICAL DISCECTOMY ANTERIOR WITH FUSION C5-6;  Surgeon: Giovany Enriquez MD;  Location: [facility];  Service: Neurosurgery;  Laterality: N/A;   •  SECTION  2010    Boy: Hitesh   •  SECTION PRIMARY  2002    Boy: [place]   • CYSTOSCOPY      X 2   • EYE SURGERY      RETINAL DETACHMENT X3   • HYSTEROSCOPY W/ POLYPECTOMY  2022    with myosure lite, D&PETRA   • NECK SURGERY  Dec 13, 2022   • SPINAL FUSION  Dec 13, 2022     Social Hx:  Social History     Tobacco Use   • Smoking status: Never   • Smokeless tobacco: Never   Vaping Use   • Vaping Use: Never used   Substance Use Topics   • Alcohol use: No   • Drug use: No     Family Hx:  Family History   Problem Relation Age of Onset   • Cancer Mother         Breast   • Asthma Mother    • Breast cancer Mother 71   • Vision loss Mother    • Cancer Father         Breast   • Arthritis Father    • Breast cancer Father 68   • Diabetes Father    • Hyperlipidemia Father    • Cancer Maternal Grandmother    • Diabetes Maternal Grandmother    • Ovarian cancer Maternal Grandmother    • Heart disease Maternal Grandfather    • Diabetes Paternal Grandmother    • Anxiety disorder Paternal Grandmother    • Thyroid disease Paternal Grandmother    • Coronary artery disease Paternal Grandfather    • Stroke Paternal Grandfather    • Heart  disease Paternal Grandfather    • Anxiety disorder Paternal Aunt    • Depression Paternal Aunt    • Miscarriages / Stillbirths Paternal Aunt    • Arthritis Maternal Aunt    • Cancer Maternal Aunt    • Miscarriages / Stillbirths Daughter    • Colon cancer Neg Hx      Review of Systems:        Review of Systems   Constitutional: Negative for activity change, appetite change, chills, diaphoresis, fatigue, fever and unexpected weight change.   HENT: Negative for congestion, dental problem, drooling, ear discharge, ear pain, facial swelling, hearing loss, mouth sores, nosebleeds, postnasal drip, rhinorrhea, sinus pressure, sinus pain, sneezing, sore throat, tinnitus, trouble swallowing and voice change.    Eyes: Negative for photophobia, pain, discharge, redness, itching and visual disturbance.   Respiratory: Negative for apnea, cough, choking, chest tightness, shortness of breath, wheezing and stridor.    Cardiovascular: Negative for chest pain, palpitations and leg swelling.   Gastrointestinal: Negative for abdominal distention, abdominal pain, anal bleeding, blood in stool, constipation, diarrhea, nausea, rectal pain and vomiting.   Endocrine: Negative for cold intolerance, heat intolerance, polydipsia, polyphagia and polyuria.   Genitourinary: Negative for decreased urine volume, difficulty urinating, dyspareunia, dysuria, enuresis, flank pain, frequency, genital sores, hematuria, menstrual problem, pelvic pain, urgency, vaginal bleeding, vaginal discharge and vaginal pain.   Musculoskeletal: Negative for arthralgias, back pain, gait problem, joint swelling, myalgias, neck pain and neck stiffness.   Skin: Negative for color change, pallor, rash and wound.   Allergic/Immunologic: Negative for environmental allergies, food allergies and immunocompromised state.   Neurological: Negative for dizziness, tremors, seizures, syncope, facial asymmetry, speech difficulty, weakness, light-headedness, numbness and headaches.    Hematological: Negative for adenopathy. Does not bruise/bleed easily.   Psychiatric/Behavioral: Negative for agitation, behavioral problems, confusion, decreased concentration, dysphoric mood, hallucinations, self-injury, sleep disturbance and suicidal ideas. The patient is not nervous/anxious and is not hyperactive.       I have reviewed this note template and all pertinent parts of the review of systems social, family history, surgical history and medication list      Physical Examination:  Vitals:    05/11/23 1510   BP: 120/80   Temp: 97.6 °F (36.4 °C)      General Appearance:   Well developed, well nourished, well groomed, alert, and cooperative.  Neurological examination:  Neurologic Exam  She is wide awake alert and follows commands    No Richelle's    Neck is incision is super well-healed    Neck is supple    Tongue is midline no hoarseness    She is very good strength right upper extremity with symmetric reflexes    Review of Imaging/DATA:  I reviewed a cervical flexion-extension film personally examined the films hardware placement looks excellent EMG nerve conduction study personally reviewed demonstrates no evidence of radiculopathy  Diagnoses/Plan:    Ms. Manning is a 52 y.o. female   1.  Status post ACDF C5-6 patchy improvement in right parascapular and right shoulder pain but overall she reports no real improvement    Unfortunately we had had an extensive discussion about this initially in surgery that she had moderate degenerative changes chronic axial neck pain for greater than 2 years duration and that the treatment of axial neck pain is complicated    From a neurosurgical standpoint I think she has had the maximum that I have to offer additionally she has no evidence of radiculopathy on EMG nerve conduction study and she does actually with coaxing to report improvement she honestly looks more comfortable in the clinic than ever seen her remembering that presurgically she was near tearful regarding  the intractable pain she had    From my standpoint I be happy to see her back anytime in the future but right now nothing to offer she can have ongoing pain management we will see her back as needed    Given the systemic nature of her fatigue and symptoms I think it is more than reasonable to consider a visit to rheumatology disc for a touch base to make sure that she does not have fibromyalgia or another rheumatologic disorder

## 2023-05-19 ENCOUNTER — TELEPHONE (OUTPATIENT)
Dept: NEUROSURGERY | Facility: CLINIC | Age: 53
End: 2023-05-19

## 2023-05-19 NOTE — TELEPHONE ENCOUNTER
Provider:  Mina  Surgery/Procedure:  ACDF C5-6  Surgery/Procedure Date:  12/13/22  Last visit: 5/11/23    Next visit: 11/9/23     Reason for call: Patient following up on rheumatology referral, has not heard back yet for an appointment.

## 2023-05-22 ENCOUNTER — TELEPHONE (OUTPATIENT)
Dept: OBSTETRICS AND GYNECOLOGY | Facility: CLINIC | Age: 53
End: 2023-05-22
Payer: COMMERCIAL

## 2023-05-22 DIAGNOSIS — Z12.31 BREAST CANCER SCREENING BY MAMMOGRAM: Primary | ICD-10-CM

## 2023-05-22 RX ORDER — FAMOTIDINE 20 MG/1
TABLET, FILM COATED ORAL
Qty: 30 TABLET | Refills: 1 | Status: SHIPPED | OUTPATIENT
Start: 2023-05-22

## 2023-05-22 NOTE — TELEPHONE ENCOUNTER
ANITA    Pt was called to reschedule her 6/12/23 Annual appt since Provider will not be in office. Pt was given next available which is 6/21/2024. Pt was offered to see another MD or Nurse Practitioner this year she declined only wants . Pt also wants her RX refilled until she can see Provider next year . Pt also wants an order put in for a Breast Mammogram for this year

## 2023-05-23 NOTE — TELEPHONE ENCOUNTER
Spoke with pt and got her appt moved up to 7/11/23 per Dr. Narayanan.    Pt then stated that she was not wanting to stop the birth control pills just yet because she couldn't remember her options.

## 2023-05-23 NOTE — TELEPHONE ENCOUNTER
Are we able to use a gyn follow up slot?    Orders Placed This Encounter   Procedures   • Mammo Screening Digital Tomosynthesis Bilateral With CAD     Standing Status:   Future     Standing Expiration Date:   5/21/2024     Order Specific Question:   Reason for Exam:     Answer:   breast cancer screening due     Has she put more thought into coming off of the birth control pill as we discussed last March?    Thanks, Jen Narayanan MD

## 2023-05-24 RX ORDER — LEVONORGESTREL AND ETHINYL ESTRADIOL 0.15-0.03
1 KIT ORAL DAILY
Qty: 91 TABLET | Refills: 0 | Status: SHIPPED | OUTPATIENT
Start: 2023-05-24

## 2023-05-24 NOTE — TELEPHONE ENCOUNTER
New Medications Ordered This Visit   Medications   • levonorgestrel-ethinyl estradiol (SEASONALE) 0.15-0.03 MG per tablet     Sig: Take 1 tablet by mouth Daily.     Dispense:  91 tablet     Refill:  0     New Rx sent to last until follow up appointment.     The main options moving forward would be if she came off of the birth control pill for ~ 2-3 months and we checked a FSH or follicle stimulating hormone level to see if it is in the post menopausal range. And I can always speak to her regarding this when we see each other.     Thanks, Jen Narayanan MD

## 2023-06-01 ENCOUNTER — OFFICE VISIT (OUTPATIENT)
Dept: FAMILY MEDICINE CLINIC | Facility: CLINIC | Age: 53
End: 2023-06-01

## 2023-06-01 VITALS
HEART RATE: 93 BPM | WEIGHT: 151 LBS | SYSTOLIC BLOOD PRESSURE: 124 MMHG | OXYGEN SATURATION: 98 % | DIASTOLIC BLOOD PRESSURE: 80 MMHG | HEIGHT: 66 IN | BODY MASS INDEX: 24.27 KG/M2

## 2023-06-01 DIAGNOSIS — R79.89 ELEVATED FERRITIN LEVEL: Primary | ICD-10-CM

## 2023-06-01 DIAGNOSIS — E53.8 COBALAMIN DEFICIENCY: ICD-10-CM

## 2023-06-01 DIAGNOSIS — L65.9 ALOPECIA: ICD-10-CM

## 2023-06-01 DIAGNOSIS — R73.9 HYPERGLYCEMIA: ICD-10-CM

## 2023-06-01 DIAGNOSIS — M54.12 CHRONIC CERVICAL RADICULOPATHY: ICD-10-CM

## 2023-06-01 DIAGNOSIS — K21.9 GASTROESOPHAGEAL REFLUX DISEASE WITHOUT ESOPHAGITIS: ICD-10-CM

## 2023-06-01 DIAGNOSIS — E78.1 HYPERTRIGLYCERIDEMIA: ICD-10-CM

## 2023-06-01 RX ORDER — FAMOTIDINE 20 MG/1
TABLET, FILM COATED ORAL
Qty: 90 TABLET | Refills: 1 | Status: SHIPPED | OUTPATIENT
Start: 2023-06-01

## 2023-06-01 RX ORDER — ICOSAPENT ETHYL 1000 MG/1
2 CAPSULE ORAL DAILY
Qty: 120 CAPSULE | Refills: 0 | Status: SHIPPED | OUTPATIENT
Start: 2023-06-01

## 2023-06-01 RX ORDER — DULOXETIN HYDROCHLORIDE 60 MG/1
60 CAPSULE, DELAYED RELEASE ORAL DAILY
Qty: 30 CAPSULE | Refills: 1 | Status: SHIPPED | OUTPATIENT
Start: 2023-06-01

## 2023-06-01 NOTE — PROGRESS NOTES
"Chief Complaint  Med Refill    Subjective        Francesca Manning presents to [facility]  History of Present Illness  Patient reports today for medication refills and will return to clinic for fasting lab work.    Patient reports having elevated triglycerides.  States she has Vascepa however has only been taking 1 tablet a day.    Patient reports having hair loss since her spinal fusion surgery last year.  Patient reports she is followed by dermatology and they have scheduled for a scalp biopsy.  Patient reports she continues spironolactone.  Patient states her dermatologist padmini blood work and her iron level was elevated.  Patient states since then she has discontinued taking iron supplement.  Patient reports no history of iron deficiency anemia.  Patient would like to get her ferritin level rechecked today.    Patient reports she continues to have chronic cervical radiculopathy even after her surgery.  Patient states she does get some relief from duloxetine at 30 mg however would like to increase it this day.    Patient reports going to ENT a few months ago secondary to fullness sensation in throat.  Patient reports she was advised it is secondary to acid reflux.  Patient reports she has not taken omeprazole in the morning and famotidine in afternoon.      Objective   Vital Signs:  /80 (BP Location: Left arm, Patient Position: Sitting, Cuff Size: Adult)   Pulse 93   Ht 167.6 cm (66\")   Wt 68.5 kg (151 lb)   SpO2 98%   BMI 24.37 kg/m²   Estimated body mass index is 24.37 kg/m² as calculated from the following:    Height as of this encounter: 167.6 cm (66\").    Weight as of this encounter: 68.5 kg (151 lb).       BMI is within normal parameters. No other follow-up for BMI required.      Physical Exam   Result Review :                   Assessment and Plan   Diagnoses and all orders for this visit:    1. Elevated ferritin level (Primary)  Assessment & Plan:  We will recheck " level today    Orders:  -     CBC & Differential; Future  -     Iron; Future  -     Ferritin; Future    2. Hypertriglyceridemia  Assessment & Plan:  Advised patient to increase her Vascepa from once a day to 1 tablet twice a day totaling 2 g daily.  Patient knowledge understanding    Orders:  -     Vascepa 1 g capsule capsule; Take 2 g by mouth Daily. For cholesterol  Dispense: 120 capsule; Refill: 0  -     Lipid Panel; Future    3. Gastroesophageal reflux disease without esophagitis  Assessment & Plan:  Continue current treatment plan with famotidine and omeprazole.  Patient will continue follow-up with ENT secondary to esophageal abnormality.    Orders:  -     famotidine (PEPCID) 20 MG tablet; Take 1 tab po QHS for acid reflux  Dispense: 90 tablet; Refill: 1    4. Hyperglycemia  Assessment & Plan:  We will check A1c    Orders:  -     Comprehensive Metabolic Panel; Future  -     Hemoglobin A1c; Future    5. Cobalamin deficiency  Assessment & Plan:  Continue current treatment plan      6. Chronic cervical radiculopathy  Assessment & Plan:  Patient's duloxetine increased to 60 mg daily    Orders:  -     DULoxetine (CYMBALTA) 60 MG capsule; Take 1 capsule by mouth Daily. For nerve pain  Dispense: 30 capsule; Refill: 1    7. Alopecia  Assessment & Plan:  Patient will continue follow-up with dermatology.    Orders:  -     TSH; Future           Follow Up   Return in about 6 months (around 12/1/2023), or if symptoms worsen or fail to improve.  Patient was given instructions and counseling regarding her condition or for health maintenance advice. Please see specific information pulled into the AVS if appropriate.

## 2023-06-01 NOTE — ASSESSMENT & PLAN NOTE
Advised patient to increase her Vascepa from once a day to 1 tablet twice a day totaling 2 g daily.  Patient knowledge understanding

## 2023-06-01 NOTE — ASSESSMENT & PLAN NOTE
Continue current treatment plan with famotidine and omeprazole.  Patient will continue follow-up with ENT secondary to esophageal abnormality.

## 2023-06-08 DIAGNOSIS — E78.1 HYPERTRIGLYCERIDEMIA: ICD-10-CM

## 2023-06-08 RX ORDER — ICOSAPENT ETHYL 1000 MG/1
CAPSULE ORAL
Qty: 360 CAPSULE | Refills: 1 | Status: SHIPPED | OUTPATIENT
Start: 2023-06-08

## 2023-06-21 PROBLEM — R19.7 DIARRHEA: Status: ACTIVE | Noted: 2023-06-21

## 2023-07-11 PROBLEM — M47.812 CERVICAL ARTHRITIS: Status: ACTIVE | Noted: 2023-07-11

## 2023-07-13 PROBLEM — E86.0 DEHYDRATION: Status: ACTIVE | Noted: 2023-07-13

## 2023-07-13 PROBLEM — R30.0 DYSURIA: Status: ACTIVE | Noted: 2023-07-13

## 2023-07-27 RX ORDER — SPIRONOLACTONE 50 MG/1
TABLET, FILM COATED ORAL
Qty: 30 TABLET | Refills: 0 | Status: SHIPPED | OUTPATIENT
Start: 2023-07-27

## 2023-08-09 ENCOUNTER — HOSPITAL ENCOUNTER (OUTPATIENT)
Dept: MAMMOGRAPHY | Facility: HOSPITAL | Age: 53
Discharge: HOME | End: 2023-08-09
Payer: COMMERCIAL

## 2023-08-09 ENCOUNTER — HOSPITAL ENCOUNTER (OUTPATIENT)
Dept: ULTRASOUND IMAGING | Facility: HOSPITAL | Age: 53
Discharge: HOME | End: 2023-08-09
Payer: COMMERCIAL

## 2023-08-09 DIAGNOSIS — R92.8 ABNORMAL MAMMOGRAM: ICD-10-CM

## 2023-08-09 PROCEDURE — 76642 ULTRASOUND BREAST LIMITED: CPT

## 2023-08-09 PROCEDURE — 77065 DX MAMMO INCL CAD UNI: CPT

## 2023-08-09 PROCEDURE — 76642 ULTRASOUND BREAST LIMITED: CPT | Performed by: RADIOLOGY

## 2023-08-09 PROCEDURE — 77065 DX MAMMO INCL CAD UNI: CPT | Performed by: RADIOLOGY

## 2023-08-15 DIAGNOSIS — F41.9 ANXIETY DUE TO INVASIVE PROCEDURE: Primary | ICD-10-CM

## 2023-08-15 RX ORDER — DIAZEPAM 2 MG/1
TABLET ORAL
Qty: 1 TABLET | Refills: 0 | Status: SHIPPED | OUTPATIENT
Start: 2023-08-15

## 2023-08-19 DIAGNOSIS — M54.12 CHRONIC CERVICAL RADICULOPATHY: ICD-10-CM

## 2023-08-22 ENCOUNTER — HOSPITAL ENCOUNTER (OUTPATIENT)
Dept: MRI IMAGING | Facility: HOSPITAL | Age: 53
Discharge: HOME | End: 2023-08-22
Admitting: OBSTETRICS & GYNECOLOGY
Payer: COMMERCIAL

## 2023-08-22 DIAGNOSIS — R92.8 ABNORMAL FINDINGS ON DIAGNOSTIC IMAGING OF BREAST: ICD-10-CM

## 2023-08-22 DIAGNOSIS — Z91.89 AT HIGH RISK FOR BREAST CANCER: ICD-10-CM

## 2023-08-22 PROCEDURE — 77049 MRI BREAST C-+ W/CAD BI: CPT | Performed by: RADIOLOGY

## 2023-08-22 PROCEDURE — 77049 MRI BREAST C-+ W/CAD BI: CPT

## 2023-08-22 PROCEDURE — A9577 INJ MULTIHANCE: HCPCS | Performed by: OBSTETRICS & GYNECOLOGY

## 2023-08-22 PROCEDURE — 0 GADOBENATE DIMEGLUMINE 529 MG/ML SOLUTION: Performed by: OBSTETRICS & GYNECOLOGY

## 2023-08-22 RX ADMIN — GADOBENATE DIMEGLUMINE 14 ML: 529 INJECTION, SOLUTION INTRAVENOUS at 16:11

## 2023-08-23 ENCOUNTER — TELEPHONE (OUTPATIENT)
Dept: MRI IMAGING | Facility: HOSPITAL | Age: 53
End: 2023-08-23
Payer: COMMERCIAL

## 2023-08-23 ENCOUNTER — TELEPHONE (OUTPATIENT)
Dept: OBSTETRICS AND GYNECOLOGY | Facility: CLINIC | Age: 53
End: 2023-08-23
Payer: COMMERCIAL

## 2023-08-23 DIAGNOSIS — R92.1 BREAST CALCIFICATION, LEFT: Primary | ICD-10-CM

## 2023-08-23 RX ORDER — DULOXETINE 40 MG/1
CAPSULE, DELAYED RELEASE ORAL
Qty: 30 CAPSULE | Refills: 0 | Status: SHIPPED | OUTPATIENT
Start: 2023-08-23

## 2023-08-23 NOTE — TELEPHONE ENCOUNTER
ANITA    Pt called stating that jaime had a breast MRI done yesterday. Nothing was seen on her report however she still advised to make an appointment with surgeons. Pt wanted to know if Dr. Narayanan can send a referral to see them.

## 2023-08-24 NOTE — TELEPHONE ENCOUNTER
I have never placed the referral in this situation. Usually the breast center arranges this. Can we touch base with the patient to see if they gave her options for breast surgeons. If not, I am happy to place a referral.     Thanks, Jen Narayanan MD

## 2023-08-24 NOTE — TELEPHONE ENCOUNTER
Pt informed and stated understanding.  Pt stated that they did not give her any options for breast surgeons.

## 2023-08-25 NOTE — TELEPHONE ENCOUNTER
Orders Placed This Encounter   Procedures    Ambulatory Referral to Breast Surgery     Referral Priority:   Routine     Referral Type:   Consultation     Referral Reason:   Specialty Services Required     Requested Specialty:   Breast Surgery     Number of Visits Requested:   1     Referral placed to Dr. Marilin Araujo if we can make sure this gets set up.     Thanks, Jen Narayanan MD

## 2023-08-28 ENCOUNTER — TELEPHONE (OUTPATIENT)
Dept: OBSTETRICS AND GYNECOLOGY | Facility: CLINIC | Age: 53
End: 2023-08-28
Payer: COMMERCIAL

## 2023-08-28 NOTE — TELEPHONE ENCOUNTER
Left message for pt to return call.  Dr. Araujo's number is [phone] and she is located at [address] [place] [address] 202.

## 2023-08-28 NOTE — TELEPHONE ENCOUNTER
Pt informed and stated understanding.  Pt has not heard from their office as of yet, so I gave her their number and address for her to give them a call about getting an appt scheduled.

## 2023-08-29 RX ORDER — SPIRONOLACTONE 50 MG/1
TABLET, FILM COATED ORAL
Qty: 30 TABLET | Refills: 0 | Status: SHIPPED | OUTPATIENT
Start: 2023-08-29

## 2023-09-18 ENCOUNTER — TRANSCRIBE ORDERS (OUTPATIENT)
Dept: ADMINISTRATIVE | Facility: HOSPITAL | Age: 53
End: 2023-09-18
Payer: COMMERCIAL

## 2023-09-18 DIAGNOSIS — R92.8 ABNORMAL MAMMOGRAM: Primary | ICD-10-CM

## 2023-09-20 DIAGNOSIS — M54.12 CHRONIC CERVICAL RADICULOPATHY: ICD-10-CM

## 2023-09-20 RX ORDER — DULOXETINE 40 MG/1
CAPSULE, DELAYED RELEASE ORAL
Qty: 30 CAPSULE | Refills: 0 | Status: SHIPPED | OUTPATIENT
Start: 2023-09-20

## 2023-09-28 RX ORDER — SPIRONOLACTONE 50 MG/1
TABLET, FILM COATED ORAL
Qty: 30 TABLET | Refills: 0 | Status: SHIPPED | OUTPATIENT
Start: 2023-09-28

## 2023-10-11 ENCOUNTER — HOSPITAL ENCOUNTER (OUTPATIENT)
Dept: MAMMOGRAPHY | Facility: HOSPITAL | Age: 53
Discharge: HOME | End: 2023-10-11
Payer: COMMERCIAL

## 2023-10-11 DIAGNOSIS — R92.8 ABNORMAL MAMMOGRAM: ICD-10-CM

## 2023-10-11 PROCEDURE — 19283 PERQ DEV BREAST 1ST STRTCTC: CPT | Performed by: RADIOLOGY

## 2023-10-11 PROCEDURE — 77065 DX MAMMO INCL CAD UNI: CPT | Performed by: RADIOLOGY

## 2023-10-11 PROCEDURE — 25010000002 LIDOCAINE 1 % SOLUTION: Performed by: RADIOLOGY

## 2023-10-11 PROCEDURE — A4648 IMPLANTABLE TISSUE MARKER: HCPCS

## 2023-10-11 RX ORDER — LIDOCAINE HYDROCHLORIDE 10 MG/ML
5 INJECTION, SOLUTION INFILTRATION; PERINEURAL ONCE
Status: COMPLETED | OUTPATIENT
Start: 2023-10-11 | End: 2023-10-11

## 2023-10-11 RX ADMIN — Medication 5 ML: at 08:34

## 2023-10-17 DIAGNOSIS — M54.12 CHRONIC CERVICAL RADICULOPATHY: ICD-10-CM

## 2023-10-17 RX ORDER — DULOXETINE 40 MG/1
CAPSULE, DELAYED RELEASE ORAL
Qty: 30 CAPSULE | Refills: 0 | Status: SHIPPED | OUTPATIENT
Start: 2023-10-17

## 2023-10-24 ENCOUNTER — LAB REQUISITION (OUTPATIENT)
Dept: LAB | Facility: HOSPITAL | Age: 53
End: 2023-10-24
Payer: COMMERCIAL

## 2023-10-24 ENCOUNTER — HOSPITAL ENCOUNTER (OUTPATIENT)
Dept: MAMMOGRAPHY | Facility: HOSPITAL | Age: 53
Discharge: HOME | End: 2023-10-24
Payer: COMMERCIAL

## 2023-10-24 DIAGNOSIS — R92.8 ABNORMAL MAMMOGRAM: ICD-10-CM

## 2023-10-24 DIAGNOSIS — R92.8 OTHER ABNORMAL AND INCONCLUSIVE FINDINGS ON DIAGNOSTIC IMAGING OF BREAST: ICD-10-CM

## 2023-10-24 PROCEDURE — 76098 X-RAY EXAM SURGICAL SPECIMEN: CPT

## 2023-10-24 PROCEDURE — 76098 X-RAY EXAM SURGICAL SPECIMEN: CPT | Performed by: RADIOLOGY

## 2023-10-24 PROCEDURE — 88342 IMHCHEM/IMCYTCHM 1ST ANTB: CPT | Performed by: SURGERY

## 2023-10-24 PROCEDURE — 88307 TISSUE EXAM BY PATHOLOGIST: CPT | Performed by: SURGERY

## 2023-10-24 PROCEDURE — 88360 TUMOR IMMUNOHISTOCHEM/MANUAL: CPT | Performed by: SURGERY

## 2023-10-26 RX ORDER — SPIRONOLACTONE 50 MG/1
TABLET, FILM COATED ORAL
Qty: 30 TABLET | Refills: 0 | Status: SHIPPED | OUTPATIENT
Start: 2023-10-26

## 2023-10-27 LAB
CYTO UR: NORMAL
LAB AP CASE REPORT: NORMAL
LAB AP CLINICAL INFORMATION: NORMAL
LAB AP DIAGNOSIS COMMENT: NORMAL
PATH REPORT.FINAL DX SPEC: NORMAL
PATH REPORT.GROSS SPEC: NORMAL

## 2023-11-03 ENCOUNTER — LAB (OUTPATIENT)
Dept: FAMILY MEDICINE CLINIC | Facility: CLINIC | Age: 53
End: 2023-11-03
Payer: COMMERCIAL

## 2023-11-03 DIAGNOSIS — N95.1 PERIMENOPAUSAL: ICD-10-CM

## 2023-11-03 PROCEDURE — 36415 COLL VENOUS BLD VENIPUNCTURE: CPT | Performed by: OBSTETRICS & GYNECOLOGY

## 2023-11-04 LAB — FSH SERPL-ACNC: 34.9 MIU/ML

## 2023-11-07 ENCOUNTER — LAB REQUISITION (OUTPATIENT)
Dept: LAB | Facility: HOSPITAL | Age: 53
End: 2023-11-07
Payer: COMMERCIAL

## 2023-11-07 DIAGNOSIS — R92.8 OTHER ABNORMAL AND INCONCLUSIVE FINDINGS ON DIAGNOSTIC IMAGING OF BREAST: ICD-10-CM

## 2023-11-07 PROCEDURE — 88307 TISSUE EXAM BY PATHOLOGIST: CPT

## 2023-11-07 PROCEDURE — 88305 TISSUE EXAM BY PATHOLOGIST: CPT

## 2023-11-10 ENCOUNTER — OFFICE VISIT (OUTPATIENT)
Dept: NEUROSURGERY | Facility: CLINIC | Age: 53
End: 2023-11-10
Payer: COMMERCIAL

## 2023-11-10 ENCOUNTER — HOSPITAL ENCOUNTER (OUTPATIENT)
Dept: GENERAL RADIOLOGY | Facility: HOSPITAL | Age: 53
Discharge: HOME | End: 2023-11-10
Admitting: NEUROLOGICAL SURGERY
Payer: COMMERCIAL

## 2023-11-10 VITALS — SYSTOLIC BLOOD PRESSURE: 130 MMHG | DIASTOLIC BLOOD PRESSURE: 80 MMHG | TEMPERATURE: 97.8 F

## 2023-11-10 DIAGNOSIS — M47.812 CERVICAL ARTHRITIS: Primary | ICD-10-CM

## 2023-11-10 DIAGNOSIS — G89.29 CHRONIC RIGHT SHOULDER PAIN: ICD-10-CM

## 2023-11-10 DIAGNOSIS — M47.812 CERVICAL ARTHRITIS: ICD-10-CM

## 2023-11-10 DIAGNOSIS — M25.511 CHRONIC RIGHT SHOULDER PAIN: ICD-10-CM

## 2023-11-10 DIAGNOSIS — Z98.890 STATUS POST CERVICAL DISCECTOMY: ICD-10-CM

## 2023-11-10 LAB — REF LAB TEST METHOD: NORMAL

## 2023-11-10 PROCEDURE — 72040 X-RAY EXAM NECK SPINE 2-3 VW: CPT

## 2023-11-10 PROCEDURE — 99214 OFFICE O/P EST MOD 30 MIN: CPT | Performed by: NEUROLOGICAL SURGERY

## 2023-11-10 RX ORDER — DICYCLOMINE HYDROCHLORIDE 10 MG/1
10 CAPSULE ORAL EVERY 8 HOURS
COMMUNITY

## 2023-11-10 RX ORDER — TIZANIDINE 4 MG/1
4 TABLET ORAL
COMMUNITY

## 2023-11-10 NOTE — PROGRESS NOTES
NAME: BRIAN GARCIA   DOS: 11/10/2023  : 1970  PCP: Dominique Blunt PA-C    Chief Complaint:    Chief Complaint   Patient presents with    Follow-up    Arm Pain    Leg Swelling    Numbness    Tingling       History of Present Illness:  52 y.o. female   Very pleasant 52-year-old female well-known to me for complex history of neck and arm pain.  She underwent an uncomplicated anterior cervical discectomy and fusion at C5-6 at her initial presentation she had 2 years of arm pain recalcitrant to other therapies and we talked about conservatism but she had had significant discomfort about this    She underwent uncomplicated ACDF and presented for follow-up she had 3 weeks of reasonable pain control however reported worsening of her pain the pain is from the deltoid area down to the bicep she occasionally has numbness in her fourth and fifth digits presurgically it was the whole arm and it was intractable down in the forearm she denies progressive weakness she underwent injections in the shoulder that seem to help and does have a history of complex shoulder issues    She is currently having difficulty returning to work she is here for evaluation    PMHX  Allergies:  Allergies   Allergen Reactions    Sulfa Antibiotics Rash     Medications    Current Outpatient Medications:     acetaminophen (TYLENOL) 500 MG tablet, , Disp: , Rfl:     atorvastatin (LIPITOR) 10 MG tablet, Take 1 tablet by mouth Daily. For cholesterol, Disp: 90 tablet, Rfl: 1    cholecalciferol (VITAMIN D3) 250 MCG (85562 UT) capsule, Take  by mouth Daily. 1000 IU, Disp: , Rfl: 5    Cyanocobalamin (B-12 PO), Take 1,000 mcg by mouth Daily., Disp: , Rfl:     diazePAM (Valium) 2 MG tablet, Take one pill about 30-60 minutes prior to MRI., Disp: 1 tablet, Rfl: 0    dicyclomine (BENTYL) 10 MG capsule, Take 1 capsule by mouth Every 8 (Eight) Hours., Disp: , Rfl:     DULoxetine HCl 40 MG capsule delayed-release particles, TAKE ONE CAPSULE BY MOUTH  EVERY DAY FOR NERVE PAIN, Disp: 30 capsule, Rfl: 0    famotidine (PEPCID) 20 MG tablet, Take 1 tab po QHS for acid reflux, Disp: 90 tablet, Rfl: 1    ferrous sulfate 325 (65 FE) MG tablet, Take 1 tablet by mouth Daily., Disp: , Rfl:     methocarbamol (ROBAXIN) 750 MG tablet, Take 1 tablet by mouth 3 (Three) Times a Day As Needed for Muscle Spasms for up to 90 doses. This medicine will help with back pain from spasm take as needed use caution when starting medicine to ensure it does not cause drowsiness or other side effects, Disp: 90 tablet, Rfl: 0    nitrofurantoin, macrocrystal-monohydrate, (Macrobid) 100 MG capsule, Take 1 capsule by mouth 2 (Two) Times a Day., Disp: 14 capsule, Rfl: 0    norethindrone (MICRONOR) 0.35 MG tablet, Take 1 tablet by mouth Daily., Disp: 84 tablet, Rfl: 4    omeprazole (priLOSEC) 40 MG capsule, Take 1 capsule by mouth Daily., Disp: , Rfl:     ondansetron ODT (ZOFRAN-ODT) 4 MG disintegrating tablet, Place 1 tablet on the tongue Every 8 (Eight) Hours As Needed for Nausea or Vomiting., Disp: 15 tablet, Rfl: 1    oxyCODONE-acetaminophen (PERCOCET) 5-325 MG per tablet, Take 1 tablet by mouth Every 4 (Four) Hours As Needed., Disp: 10 tablet, Rfl: 0    spironolactone (ALDACTONE) 50 MG tablet, TAKE ONE TABLET BY MOUTH EVERY DAY, Disp: 30 tablet, Rfl: 0    SUMAtriptan (IMITREX) 25 MG tablet, Take one tablet at onset of headache. May repeat dose one time in 2 hours if headache not relieved., Disp: 12 tablet, Rfl: 5    tiZANidine (ZANAFLEX) 4 MG tablet, 1 tablet., Disp: , Rfl:     Vascepa 1 g capsule capsule, Take 2 tabs po BID For cholesterol, Disp: 360 capsule, Rfl: 1  Past Medical History:  Past Medical History:   Diagnosis Date    Anxiety     Arthritis     Arthritis of neck currently    BRCA1 negative     BRCA2 negative     Cervical arthritis 07/11/2023    Cervical disc disorder Feb 2021    Chronic cervical radiculopathy 06/01/2023    Diabetes mellitus Gestational    Frozen shoulder approx  year ago    GERD (gastroesophageal reflux disease)     Gestational diabetes     Hypercholesteremia     Low back pain neck    2021    Migraines     Mild depression 10/17/2022    Multiple gestation     PONV (postoperative nausea and vomiting)     Urinary tract infection     Varicella      Past Surgical History:  Past Surgical History:   Procedure Laterality Date    ANTERIOR CERVICAL DISCECTOMY W/ FUSION N/A 2022    Procedure: CERVICAL DISCECTOMY ANTERIOR WITH FUSION C5-6;  Surgeon: Giovany Enriquez MD;  Location: [facility];  Service: Neurosurgery;  Laterality: N/A;     SECTION  2010    Boy: Hitesh     SECTION PRIMARY  2002    Boy: [place]    CYSTOSCOPY      X 2    EYE SURGERY      RETINAL DETACHMENT X3    HYSTEROSCOPY W/ POLYPECTOMY  2022    with myosure lite, D&C    NECK SURGERY  Dec 13, 2022    SPINAL FUSION  Dec 13, 2022    WISDOM TOOTH EXTRACTION       Social Hx:  Social History     Tobacco Use    Smoking status: Never    Smokeless tobacco: Never   Vaping Use    Vaping Use: Never used   Substance Use Topics    Alcohol use: Never    Drug use: Never     Family Hx:  Family History   Problem Relation Age of Onset    Cancer Mother         Breast    Asthma Mother     Breast cancer Mother 71    Vision loss Mother     Cancer Father         Breast    Arthritis Father     Breast cancer Father 68    Diabetes Father     Hyperlipidemia Father     Cancer Maternal Grandmother     Diabetes Maternal Grandmother     Ovarian cancer Maternal Grandmother     Uterine cancer Maternal Grandmother     Heart disease Maternal Grandfather     Diabetes Paternal Grandmother     Anxiety disorder Paternal Grandmother     Thyroid disease Paternal Grandmother     Coronary artery disease Paternal Grandfather     Stroke Paternal Grandfather     Heart disease Paternal Grandfather     Anxiety disorder Paternal Aunt     Depression Paternal Aunt     Miscarriages / Stillbirths Paternal Aunt     Arthritis  Maternal Aunt     Cancer Maternal Aunt     Ovarian cancer Maternal Aunt     Miscarriages / Stillbirths Daughter     Colon cancer Neg Hx      Review of Systems:        Review of Systems   Constitutional:  Positive for unexpected weight change.      I have reviewed this note template and all pertinent parts of the review of systems social, family history, surgical history and medication list    Physical Examination:  There were no vitals filed for this visit.   General Appearance:   Well developed, well nourished, well groomed, alert, and cooperative.  Neurological examination:  Neurologic Exam  Her cranial nerves are intact she has no evidence of Richelle's    Incisions pristine    Neck is supple    She is got good range of motion of the shoulder slight tenderness in the AC joint deltoid she has no winging of the scapula    Her arm is quite strong    She has a normal gait with reflexes that are brisk but symmetric    Review of Imaging/DATA:  I personally reviewed her MRI of the cervical spine it appears very well decompressed EMG nerve conduction study shows no evidence of nerve root compression  Repeat cervical flexion-extension films appear good today without evidence of any subsidence she appears more fused and it certainly stable compared to prior studies  Diagnoses/Plan:    Ms. Manning is a 52 y.o. female   1.  Complex right-sided upper extremity pain    2.  Status post ACDF with some resolution of the forearm pain however still with significant persistent annoyance of the right-sided bicep and deltoid region pain normal EMG cervical flexion-extension films look excellent plating system appears intact with ongoing evidence of adequate fusion without subsidence    From a neurosurgical standpoint I explained that really I do not have a lot to offer unfortunately I think she has a complex upper extremity multifactorial pain syndrome that is complicated by shoulder discomfort and AC joint dysfunction rotator cuff  dysfunction as well as a prior history of C6 but the EMG shows no evidence of ongoing neurologic cause from this    We have talked about this even presurgically given the complex and longstanding nature of her pain    From a neurosurgical standpoint I recommended    1.  Physical therapy    2.  She can follow-up with her shoulder specialist she had an injection that seemed to help per his notes    3.  Needs to form a good relationship with ongoing pain specialist she can talk about therapy such as pain stimulators injections etc. she is open to all of those therapies I explained that she will need to be managed in a long-term    Lastly it is okay for her from a neurosurgical standpoint to retire from work I have no particular expertise with that regarding her labor but she has undergone a cervical fusion and has met the maximum point of her medical improvement    We also would recommend that she follow-up with [facility] for support with coping skills given the complex nature and her cognitive fatigue associated with managing her pain    It has been pleasure to provide neurosurgical care , we will see her back as needed

## 2023-11-14 DIAGNOSIS — M54.12 CHRONIC CERVICAL RADICULOPATHY: ICD-10-CM

## 2023-11-14 RX ORDER — DULOXETINE 40 MG/1
CAPSULE, DELAYED RELEASE ORAL
Qty: 30 CAPSULE | Refills: 0 | Status: SHIPPED | OUTPATIENT
Start: 2023-11-14

## 2023-11-28 RX ORDER — SPIRONOLACTONE 50 MG/1
TABLET, FILM COATED ORAL
Qty: 30 TABLET | Refills: 0 | Status: SHIPPED | OUTPATIENT
Start: 2023-11-28

## 2023-12-06 ENCOUNTER — OFFICE VISIT (OUTPATIENT)
Dept: FAMILY MEDICINE CLINIC | Facility: CLINIC | Age: 53
End: 2023-12-06
Payer: COMMERCIAL

## 2023-12-06 ENCOUNTER — TELEPHONE (OUTPATIENT)
Dept: FAMILY MEDICINE CLINIC | Facility: CLINIC | Age: 53
End: 2023-12-06

## 2023-12-06 VITALS
HEART RATE: 107 BPM | OXYGEN SATURATION: 97 % | SYSTOLIC BLOOD PRESSURE: 110 MMHG | BODY MASS INDEX: 27 KG/M2 | DIASTOLIC BLOOD PRESSURE: 80 MMHG | WEIGHT: 168 LBS | HEIGHT: 66 IN

## 2023-12-06 DIAGNOSIS — E78.1 HYPERTRIGLYCERIDEMIA: ICD-10-CM

## 2023-12-06 DIAGNOSIS — R73.9 HYPERGLYCEMIA: ICD-10-CM

## 2023-12-06 DIAGNOSIS — K21.9 GASTROESOPHAGEAL REFLUX DISEASE WITHOUT ESOPHAGITIS: ICD-10-CM

## 2023-12-06 DIAGNOSIS — G43.009 MIGRAINE WITHOUT AURA AND WITHOUT STATUS MIGRAINOSUS, NOT INTRACTABLE: ICD-10-CM

## 2023-12-06 DIAGNOSIS — L65.9 ALOPECIA: ICD-10-CM

## 2023-12-06 DIAGNOSIS — Z79.899 HIGH RISK MEDICATION USE: ICD-10-CM

## 2023-12-06 DIAGNOSIS — M54.12 CHRONIC CERVICAL RADICULOPATHY: ICD-10-CM

## 2023-12-06 DIAGNOSIS — M54.12 CHRONIC CERVICAL RADICULOPATHY: Primary | ICD-10-CM

## 2023-12-06 DIAGNOSIS — Z00.00 PHYSICAL EXAM: Primary | ICD-10-CM

## 2023-12-06 DIAGNOSIS — E78.2 MIXED HYPERLIPIDEMIA: ICD-10-CM

## 2023-12-06 RX ORDER — SPIRONOLACTONE 50 MG/1
50 TABLET, FILM COATED ORAL DAILY
Qty: 90 TABLET | Refills: 1 | Status: SHIPPED | OUTPATIENT
Start: 2023-12-06

## 2023-12-06 RX ORDER — MULTIPLE VITAMINS W/ MINERALS TAB 9MG-400MCG
1 TAB ORAL DAILY
COMMUNITY

## 2023-12-06 RX ORDER — SUMATRIPTAN 25 MG/1
TABLET, FILM COATED ORAL
Qty: 12 TABLET | Refills: 5 | Status: SHIPPED | OUTPATIENT
Start: 2023-12-06

## 2023-12-06 RX ORDER — ICOSAPENT ETHYL 1000 MG/1
CAPSULE ORAL
Qty: 360 CAPSULE | Refills: 1 | Status: SHIPPED | OUTPATIENT
Start: 2023-12-06

## 2023-12-06 RX ORDER — ATORVASTATIN CALCIUM 10 MG/1
10 TABLET, FILM COATED ORAL DAILY
Qty: 90 TABLET | Refills: 1 | Status: SHIPPED | OUTPATIENT
Start: 2023-12-06

## 2023-12-06 RX ORDER — FAMOTIDINE 20 MG/1
TABLET, FILM COATED ORAL
Qty: 90 TABLET | Refills: 1 | Status: SHIPPED | OUTPATIENT
Start: 2023-12-06

## 2023-12-06 NOTE — TELEPHONE ENCOUNTER
Patient seen this date secondary to chronic cervical radiculopathy.  History of spinal fusion in 2022.  Reports currently taking duloxetine however states it does not help with pain control and she is unable to tolerate it at higher doses.  Reports taking Lyrica earlier this year which did help with pain and would like to restart however neurosurgeon advised her to go to PCP.  Patient Greg and UDS reviewed and appropriate.  Would like to start on 75 mg of Lyrica and have her return to clinic in 4-6 weeks.

## 2023-12-06 NOTE — PROGRESS NOTES
"Chief Complaint  Annual Exam    Subjective          Francesca Manning presents to [facility]  History of Present Illness  Patient reports today for physical exam states [facility]    Patient reports having elevated cholesterol and triglycerides states she takes a statin and Vascepa daily.  Patient denies any diet modifications.    Patient reports having alopecia states she is stable with spironolactone and request refills this date.    Patient reports having acid reflux and is followed by ENT.  States she continues to take famotidine and omeprazole daily.    Patient reports having chronic migraines states they are well-controlled at this time and she continues sumatriptan as needed.    Patient reports having cervical discectomy in 2022.  States since then she has chronic nerve pain on the right side of her neck down her right arm.  Patient reports she was prescribed Lyrica earlier this year however discontinued because she thought it was causing her alopecia.  Patient states she switched to Cymbalta and the alopecia continued.  Patient reports her pain is not well-controlled with Cymbalta and she was unable to tolerate the medication at higher doses.  Patient reports her surgeon advise she reach out Lyrica however given his controlled he recommended she start the prescription with her PCP.  Patient reports she continues physical therapy.      Objective   Vital Signs:   /80   Pulse 107   Ht 167.6 cm (66\")   Wt 76.2 kg (168 lb)   SpO2 97%   BMI 27.12 kg/m²     Body mass index is 27.12 kg/m².    Review of Systems    Health Maintenance   Topic Date Due    TDAP/TD VACCINES (1 - Tdap) Never done    HEPATITIS C SCREENING  Never done    ZOSTER VACCINE (1 of 2) Never done    COVID-19 Vaccine (4 - 2023-24 season) 09/01/2023    ANNUAL PHYSICAL  11/29/2023    INFLUENZA VACCINE  03/31/2024 (Originally 8/1/2023)    BMI FOLLOWUP  01/04/2024    " LIPID PANEL  2024    COLORECTAL CANCER SCREENING  2024    PAP SMEAR  2025    MAMMOGRAM  2025    Pneumococcal Vaccine 0-64  Aged Out        Past History:  Medical History: has a past medical history of Anxiety, Arthritis, Arthritis of neck (currently), BRCA1 negative, BRCA2 negative, Cervical arthritis (2023), Cervical disc disorder (2021), Chronic cervical radiculopathy (2023), Diabetes mellitus (Gestational), Frozen shoulder (approx year ago), GERD (gastroesophageal reflux disease), Gestational diabetes, Hypercholesteremia, Low back pain (neck), Migraines, Mild depression (10/17/2022), Multiple gestation, PONV (postoperative nausea and vomiting), Urinary tract infection, and Varicella.   Surgical History: has a past surgical history that includes Cystoscopy;  section (2010); Eye surgery;  section, low transverse (2002); Hysteroscopy w/ polypectomy (2022); Anterior cervical discectomy w/ fusion (N/A, 2022); Neck surgery (Dec 13, 2022); Spinal fusion (Dec 13, 2022); and [place] tooth extraction ().   Family History: family history includes Anxiety disorder in her paternal aunt and paternal grandmother; Arthritis in her father and maternal aunt; Asthma in her mother; Breast cancer (age of onset: 68) in her father; Breast cancer (age of onset: 71) in her mother; Cancer in her father, maternal aunt, maternal grandmother, and mother; Coronary artery disease in her paternal grandfather; Depression in her paternal aunt; Diabetes in her father, maternal grandmother, and paternal grandmother; Heart disease in her maternal grandfather and paternal grandfather; Hyperlipidemia in her father; Miscarriages / Stillbirths in her daughter and paternal aunt; Ovarian cancer in her maternal aunt and maternal grandmother; Stroke in her paternal grandfather; Thyroid disease in her paternal grandmother; Uterine cancer in her maternal grandmother; Vision  loss in her mother.   Social History: reports that she has never smoked. She has never used smokeless tobacco. She reports that she does not drink alcohol and does not use drugs.      Current Outpatient Medications:     acetaminophen (TYLENOL) 500 MG tablet, , Disp: , Rfl:     atorvastatin (LIPITOR) 10 MG tablet, Take 1 tablet by mouth Daily. For cholesterol, Disp: 90 tablet, Rfl: 1    DULoxetine HCl 40 MG capsule delayed-release particles, TAKE ONE CAPSULE BY MOUTH EVERY DAY FOR NERVE PAIN, Disp: 30 capsule, Rfl: 0    famotidine (PEPCID) 20 MG tablet, Take 1 tab po QHS for acid reflux, Disp: 90 tablet, Rfl: 1    multivitamin with minerals tablet tablet, Take 1 tablet by mouth Daily., Disp: , Rfl:     omeprazole (priLOSEC) 40 MG capsule, Take 1 capsule by mouth Daily., Disp: , Rfl:     spironolactone (ALDACTONE) 50 MG tablet, Take 1 tablet by mouth Daily., Disp: 90 tablet, Rfl: 1    SUMAtriptan (IMITREX) 25 MG tablet, Take one tablet at onset of headache. May repeat dose one time in 2 hours if headache not relieved., Disp: 12 tablet, Rfl: 5    tiZANidine (ZANAFLEX) 4 MG tablet, 1 tablet., Disp: , Rfl:     Vascepa 1 g capsule capsule, Take 2 tabs po BID For cholesterol, Disp: 360 capsule, Rfl: 1    Allergies: Sulfa antibiotics    Physical Exam  Vitals and nursing note reviewed.   Constitutional:       General: She is not in acute distress.     Appearance: She is not ill-appearing.   HENT:      Head: Normocephalic.      Right Ear: Tympanic membrane, ear canal and external ear normal.      Left Ear: Tympanic membrane, ear canal and external ear normal.      Nose: Nose normal.      Mouth/Throat:      Mouth: Mucous membranes are moist.   Eyes:      Pupils: Pupils are equal, round, and reactive to light.   Cardiovascular:      Rate and Rhythm: Normal rate and regular rhythm.      Pulses: Normal pulses.   Pulmonary:      Effort: Pulmonary effort is normal. No respiratory distress.   Abdominal:      General: Bowel sounds  are normal.      Palpations: Abdomen is soft.      Tenderness: There is no abdominal tenderness. There is no guarding or rebound.   Musculoskeletal:         General: Normal range of motion.      Cervical back: Normal range of motion.   Skin:     General: Skin is warm and dry.      Capillary Refill: Capillary refill takes less than 2 seconds.   Neurological:      General: No focal deficit present.      Mental Status: She is oriented to person, place, and time.   Psychiatric:         Mood and Affect: Mood normal.          Result Review :                   Assessment and Plan    Diagnoses and all orders for this visit:    1. Physical exam (Primary)  Assessment & Plan:  Discussed injury prevention, diet and exercise, safe sexual practices, and screening for common diseases. Encouraged use of sunscreen and seatbelts. Encouraged SBE, avoidance of tobacco, limiting alcohol, and yearly dental and eye exams.         2. High risk medication use  Assessment & Plan:  Patient's Greg and UDS were reviewed and appropriate.  Since patient reports better pain control with Lyrica rather than duloxetine will contact supervising MD for prescription.    Orders:  -     POC Urine Drug Screen [facility]    3. Chronic cervical radiculopathy  Assessment & Plan:  Given patient's pain is not controlled with duloxetine alone and she reports better results with Lyrica we will contact supervising MD for possible prescription of starting dose of Lyrica.  Advised patient she may need to discontinue Cymbalta.  She acknowledged understanding.      4. Mixed hyperlipidemia  -     atorvastatin (LIPITOR) 10 MG tablet; Take 1 tablet by mouth Daily. For cholesterol  Dispense: 90 tablet; Refill: 1    5. Gastroesophageal reflux disease without esophagitis  Assessment & Plan:  Continue current treatment plan with famotidine and omeprazole.  Patient will continue follow-up with ENT    Orders:  -     famotidine (PEPCID) 20 MG tablet; Take 1 tab po QHS  for acid reflux  Dispense: 90 tablet; Refill: 1    6. Migraine without aura and without status migrainosus, not intractable  Assessment & Plan:  Headaches are improving with treatment.  Continue current treatment regimen.        Orders:  -     SUMAtriptan (IMITREX) 25 MG tablet; Take one tablet at onset of headache. May repeat dose one time in 2 hours if headache not relieved.  Dispense: 12 tablet; Refill: 5    7. Hypertriglyceridemia  Assessment & Plan:  Refill patient's medication she has an order for fasting labs    Orders:  -     Vascepa 1 g capsule capsule; Take 2 tabs po BID For cholesterol  Dispense: 360 capsule; Refill: 1    8. Alopecia  Assessment & Plan:  Patient continue current treatment plan with spironolactone    Orders:  -     spironolactone (ALDACTONE) 50 MG tablet; Take 1 tablet by mouth Daily.  Dispense: 90 tablet; Refill: 1    9. Hyperglycemia  Assessment & Plan:  We will check A1c          Follow Up   Return in about 6 months (around 6/6/2024).  Patient was given instructions and counseling regarding her condition or for health maintenance advice. Please see specific information pulled into the AVS if appropriate.     Dominique Blunt PA-C

## 2023-12-06 NOTE — ASSESSMENT & PLAN NOTE
Given patient's pain is not controlled with duloxetine alone and she reports better results with Lyrica we will contact supervising MD for possible prescription of starting dose of Lyrica.  Advised patient she may need to discontinue Cymbalta.  She acknowledged understanding.

## 2023-12-06 NOTE — ASSESSMENT & PLAN NOTE
Patient's Greg and UDS were reviewed and appropriate.  Since patient reports better pain control with Lyrica rather than duloxetine will contact supervising MD for prescription.

## 2023-12-06 NOTE — ASSESSMENT & PLAN NOTE
Continue current treatment plan with famotidine and omeprazole.  Patient will continue follow-up with ENT

## 2023-12-07 RX ORDER — PREGABALIN 75 MG/1
75 CAPSULE ORAL 2 TIMES DAILY
Qty: 60 CAPSULE | Refills: 2 | Status: SHIPPED | OUTPATIENT
Start: 2023-12-07

## 2023-12-07 NOTE — TELEPHONE ENCOUNTER
I sent in Lyrica 75mg BID #60 with 2 refills.  Looks like she has not made a followup visit so you may want to have Summer reach out and get her on your schedule for 4-6 wks before it fills up. Thanks

## 2023-12-22 ENCOUNTER — TELEPHONE (OUTPATIENT)
Dept: NEUROSURGERY | Facility: CLINIC | Age: 53
End: 2023-12-22
Payer: COMMERCIAL

## 2023-12-22 DIAGNOSIS — M47.812 CERVICAL ARTHRITIS: ICD-10-CM

## 2023-12-22 DIAGNOSIS — Z98.890 STATUS POST CERVICAL DISCECTOMY: Primary | ICD-10-CM

## 2023-12-22 NOTE — TELEPHONE ENCOUNTER
Caller: PATIENT    Relationship: SELF    Best call back number:[phone]    What orders are you requesting (i.e. lab or imaging): PHYSICAL THERAPY    In what timeframe would the patient need to come in: ASAP    Where will you receive your lab/imaging services: [facility]    Additional notes:PATIENT CALLED AND STATES THAT SHE IS NEEDING A ORDER FOR PHYSICAL THERAPY SENT TO [facility] AT THE FAX NUMBER: [phone]-SENDING TO OFFICE TO ADVISE THANK YOU

## 2023-12-27 ENCOUNTER — OFFICE VISIT (OUTPATIENT)
Dept: RADIATION ONCOLOGY | Facility: HOSPITAL | Age: 53
End: 2023-12-27
Payer: COMMERCIAL

## 2023-12-27 ENCOUNTER — HOSPITAL ENCOUNTER (OUTPATIENT)
Dept: RADIATION ONCOLOGY | Facility: HOSPITAL | Age: 53
Setting detail: RADIATION/ONCOLOGY SERIES
Discharge: HOME | End: 2023-12-27
Payer: COMMERCIAL

## 2023-12-27 ENCOUNTER — TELEPHONE (OUTPATIENT)
Dept: GENETICS | Facility: HOSPITAL | Age: 53
End: 2023-12-27
Payer: COMMERCIAL

## 2023-12-27 ENCOUNTER — LAB (OUTPATIENT)
Dept: LAB | Facility: HOSPITAL | Age: 53
End: 2023-12-27
Payer: COMMERCIAL

## 2023-12-27 ENCOUNTER — CONSULT (OUTPATIENT)
Dept: ONCOLOGY | Facility: CLINIC | Age: 53
End: 2023-12-27
Payer: COMMERCIAL

## 2023-12-27 VITALS
OXYGEN SATURATION: 97 % | SYSTOLIC BLOOD PRESSURE: 131 MMHG | RESPIRATION RATE: 16 BRPM | BODY MASS INDEX: 26.84 KG/M2 | HEART RATE: 101 BPM | TEMPERATURE: 97.5 F | DIASTOLIC BLOOD PRESSURE: 73 MMHG | HEIGHT: 66 IN | WEIGHT: 167 LBS

## 2023-12-27 VITALS
SYSTOLIC BLOOD PRESSURE: 131 MMHG | TEMPERATURE: 97.5 F | BODY MASS INDEX: 27.11 KG/M2 | RESPIRATION RATE: 16 BRPM | WEIGHT: 168.7 LBS | DIASTOLIC BLOOD PRESSURE: 73 MMHG | HEIGHT: 66 IN | HEART RATE: 101 BPM | OXYGEN SATURATION: 97 %

## 2023-12-27 DIAGNOSIS — D05.12 DUCTAL CARCINOMA IN SITU (DCIS) OF LEFT BREAST: Primary | ICD-10-CM

## 2023-12-27 DIAGNOSIS — Z17.0 MALIGNANT NEOPLASM OF RIGHT BREAST IN FEMALE, ESTROGEN RECEPTOR POSITIVE, UNSPECIFIED SITE OF BREAST: Primary | ICD-10-CM

## 2023-12-27 DIAGNOSIS — C50.911 MALIGNANT NEOPLASM OF RIGHT BREAST IN FEMALE, ESTROGEN RECEPTOR POSITIVE, UNSPECIFIED SITE OF BREAST: Primary | ICD-10-CM

## 2023-12-27 DIAGNOSIS — Z13.79 GENETIC TESTING: Primary | ICD-10-CM

## 2023-12-27 PROCEDURE — G0463 HOSPITAL OUTPT CLINIC VISIT: HCPCS

## 2023-12-27 NOTE — PATIENT INSTRUCTIONS
"Collect genetics today and you will be contacted by genetic counseling to schedule an \"urgent\" genetic counseling appointment to expedite test results.   If no gene mutation, call Dr. Hamilton's nurse to schedule your simulation and we will meet back towards the end of radiation to discuss hormone blockers further  If yes gene mutation, revisit with myself and surgery regarding a new plan for potential further surgery.   "

## 2023-12-27 NOTE — LETTER
2023       No Recipients    Patient: Francesca Manning   YOB: 1970   Date of Visit: 2023     Dear Steven Zaidi MD:       Thank you for referring Francesca Manning to me for evaluation. Below are the relevant portions of my assessment and plan of care.    If you have questions, please do not hesitate to call me. I look forward to following Francesca along with you.         Sincerely,        Selena Cervantes MD        CC:   No Recipients    Selena Cervantes MD  23 1314  Sign when Signing Visit    [facility]ology [facility] [place]  Office number [phone]    Fax number [phone]     New Patient Office Visit      Date: 2023     Patient Name: Francesca Manning  MRN: [id number]  : 1970    Referring Physician: Dr. Dyan Harris MD    Chief Complaint: Left breast DCIS    Cancer Stagin    History of Present Illness: Francesca Manning is a pleasant 53 y.o. female who presents today for evaluation of left breast DCIS.     Screening mammogram 2023 demonstrated microcalcifications in the retroareolar left breast which persisted on diagnostic mammogram.  These were not amenable to stereotactic biopsy.  She underwent a breast MRI 2023 which showed no suspicious mass, enhancement, or adenopathy.  She was referred for surgical excisional biopsy.    Excisional biopsy on 10/24/2023 demonstrated intermediate grade DCIS with microcalcifications and necrosis.  ER 90% and DC 10%.Margins positive.  She underwent reexcision 2023 with no residual DCIS.  Margins negative.      She is recovering well from surgery and presents to multidisciplinary breast clinic for an opinion regarding adjuvant management of recently diagnosed DCIS.  Postoperative breast pain is well controlled.  Denies leg swelling, shortness of breath, fever, cough, or other new symptoms.     Breast cancer risk profile:  Age of menarche:14   (stillborn at 25 weeks, Talbot Syndrome); Age of  first live birth 31  Age of menopause: postmenopausal, stopped OCP 2 mo ago at diagnosis.  LMP 2023.    Family history of breast, ovarian, prostate or pancreatic cancer: Father  of metastatic breast cancer at 68;  mom breast cancer in her 70s. Maternal aunt ovary cancer in her 50s. Maternal grandmother  of metastatic cancer at 69  Genetics: [facility] seeking heart regular negative My[facility] BRCA 1, 2 gene sequencing .    Past Medical History:   Past Medical History:   Diagnosis Date   • Anxiety    • Arthritis    • Arthritis of neck currently   • BRCA1 negative    • BRCA2 negative    • Breast cancer    • Cervical arthritis 2023   • Cervical disc disorder 2021   • Chronic cervical radiculopathy 2023   • Diabetes mellitus Gestational   • Frozen shoulder approx year ago   • GERD (gastroesophageal reflux disease)    • Gestational diabetes    • Hypercholesteremia    • Low back pain neck    2021   • Migraines    • Mild depression 10/17/2022   • Multiple gestation    • PONV (postoperative nausea and vomiting)    • Urinary tract infection    • Varicella        Past Surgical History:   Past Surgical History:   Procedure Laterality Date   • ANTERIOR CERVICAL DISCECTOMY W/ FUSION N/A 2022    Procedure: CERVICAL DISCECTOMY ANTERIOR WITH FUSION C5-6;  Surgeon: Giovany Enriquez MD;  Location: [facility];  Service: Neurosurgery;  Laterality: N/A;   •  SECTION  2010    Boy: Hitesh   •  SECTION PRIMARY  2002    Boy: [place]   • CYSTOSCOPY      X 2   • EYE SURGERY      RETINAL DETACHMENT X3   • HYSTEROSCOPY W/ POLYPECTOMY  2022    with myosure lite, D&PETRA   • NECK SURGERY  Dec 13, 2022   • SPINAL FUSION  Dec 13, 2022   • WISDOM TOOTH EXTRACTION         Family History:   Family History   Problem Relation Age of Onset   • Cancer Mother         Breast   • Asthma Mother    • Breast cancer Mother 71   • Vision loss Mother    • Cancer Father         Breast    • Arthritis Father    • Breast cancer Father 68   • Diabetes Father    • Hyperlipidemia Father    • Cancer Maternal Grandmother    • Diabetes Maternal Grandmother    • Ovarian cancer Maternal Grandmother    • Uterine cancer Maternal Grandmother    • Heart disease Maternal Grandfather    • Diabetes Paternal Grandmother    • Anxiety disorder Paternal Grandmother    • Thyroid disease Paternal Grandmother    • Coronary artery disease Paternal Grandfather    • Stroke Paternal Grandfather    • Heart disease Paternal Grandfather    • Anxiety disorder Paternal Aunt    • Depression Paternal Aunt    • Miscarriages / Stillbirths Paternal Aunt    • Arthritis Maternal Aunt    • Cancer Maternal Aunt    • Ovarian cancer Maternal Aunt    • Miscarriages / Stillbirths Daughter    • Colon cancer Neg Hx        Social History:   Social History     Socioeconomic History   • Marital status:    Tobacco Use   • Smoking status: Never   • Smokeless tobacco: Never   Vaping Use   • Vaping Use: Never used   Substance and Sexual Activity   • Alcohol use: Never   • Drug use: Never   • Sexual activity: Yes     Partners: Male     Birth control/protection: Pill       Medications:     Current Outpatient Medications:   •  acetaminophen (TYLENOL) 500 MG tablet, , Disp: , Rfl:   •  atorvastatin (LIPITOR) 10 MG tablet, Take 1 tablet by mouth Daily. For cholesterol, Disp: 90 tablet, Rfl: 1  •  DULoxetine HCl 40 MG capsule delayed-release particles, TAKE ONE CAPSULE BY MOUTH EVERY DAY FOR NERVE PAIN (Patient taking differently: Weaning off), Disp: 30 capsule, Rfl: 0  •  famotidine (PEPCID) 20 MG tablet, Take 1 tab po QHS for acid reflux, Disp: 90 tablet, Rfl: 1  •  multivitamin with minerals tablet tablet, Take 1 tablet by mouth Daily., Disp: , Rfl:   •  omeprazole (priLOSEC) 40 MG capsule, Take 1 capsule by mouth Daily., Disp: , Rfl:   •  pregabalin (LYRICA) 75 MG capsule, Take 1 capsule by mouth 2 (Two) Times a Day., Disp: 60 capsule, Rfl: 2  •   "spironolactone (ALDACTONE) 50 MG tablet, Take 1 tablet by mouth Daily., Disp: 90 tablet, Rfl: 1  •  SUMAtriptan (IMITREX) 25 MG tablet, Take one tablet at onset of headache. May repeat dose one time in 2 hours if headache not relieved., Disp: 12 tablet, Rfl: 5  •  tiZANidine (ZANAFLEX) 4 MG tablet, 1 tablet., Disp: , Rfl:   •  Vascepa 1 g capsule capsule, Take 2 tabs po BID For cholesterol, Disp: 360 capsule, Rfl: 1    Allergies:   Allergies   Allergen Reactions   • Sulfa Antibiotics Rash       Objective     Vital Signs:   Vitals:    12/27/23 1126   BP: 131/73   Pulse: 101   Resp: 16   Temp: 97.5 °F (36.4 °C)   TempSrc: Temporal   SpO2: 97%   Weight: 75.8 kg (167 lb)   Height: 167.6 cm (65.98\")   PainSc:   2    Body mass index is 26.97 kg/m².   Pain Score    12/27/23 1126   PainSc:   2       ECOG Performance Status: 0    Physical Exam:   General: No acute distress. Well appearing   HEENT: Normocephalic, atraumatic. Sclera anicteric.   Neck: supple, no adenopathy.   Cardiovascular: regular rate and rhythm. No murmurs.   Respiratory: Normal rate. Clear to auscultation bilaterally  Abdomen: Soft, nontender, non distended with normoactive bowel sounds  Lymph: no cervical, supraclavicular or axillary adenopathy  Neuro: Alert and oriented x 3. No focal deficits.   Ext: Symmetric, no swelling.   Breast: lumpectomy incisions well healing.     Laboratory/Imaging Reviewed:   No visits with results within 2 Week(s) from this visit.   Latest known visit with results is:   Office Visit on 12/06/2023   Component Date Value Ref Range Status   • Amphetamine Screen, Urine 12/06/2023 Negative  Negative Final   • AMP INTERNAL CONTROL 12/06/2023 Passed  Passed Final   • Barbiturates Screen, Urine 12/06/2023 Negative  Negative Final   • BARBITURATE INTERNAL CONTROL 12/06/2023 Passed  Passed Final   • Buprenorphine, Screen, Urine 12/06/2023 Negative  Negative Final   • BUPRENORPHINE INTERNAL CONTROL 12/06/2023 Passed  Passed Final   • " "Benzodiazepine Screen, Urine 12/06/2023 Negative  Negative Final   • BENZODIAZEPINE INTERNAL CONTROL 12/06/2023 Passed  Passed Final   • Cocaine Screen, Urine 12/06/2023 Negative  Negative Final   • COCAINE INTERNAL CONTROL 12/06/2023 Passed  Passed Final   • MDMA (ECSTASY) 12/06/2023 Negative  Negative Final   • MDMA (ECSTASY) INTERNAL CONTROL 12/06/2023 Passed  Passed Final   • Methamphetamine, Ur 12/06/2023 Negative  Negative Final   • METHAMPHETAMINE INTERNAL CONTROL 12/06/2023 Passed  Passed Final   • Methadone Screen, Urine 12/06/2023 Negative  Negative Final   • METHADONE INTERNAL CONTROL 12/06/2023 Passed  Passed Final   • Opiate Screen 12/06/2023 Negative  Negative Final   • OPIATES INTERNAL CONTROL 12/06/2023 Passed  Passed Final   • Oxycodone Screen, Urine 12/06/2023 Negative  Negative Final   • OXYCODONE INTERNAL CONTROL 12/06/2023 Passed  Passed Final   • Phencyclidine (PCP), Urine 12/06/2023 Negative  Negative Final   • PHENCYCLIDINE INTERNAL CONTROL 12/06/2023 Passed  Passed Final   • THC, Screen, Urine 12/06/2023 Negative  Negative Final   • THC INTERNAL CONTROL 12/06/2023 Passed  Passed Final   • Lot Number 12/06/2023 [id number]   Final   • Expiration Date 12/06/2023 [id number]   Final       No results found.    Procedures    Assessment / Plan      Assessment/Plan:     Left breast DCIS, ER positive    Strong family history of breast cancer including male breast cancer and ovarian cancer  I reviewed the patient's history, imaging and pathology and discussed her case in multidisciplinary clinic with Dr. Hamilton of radiation oncology.  We reviewed that DCIS represents a non-invasive or \"precancerous\" condition. -She has had surgery to remove the existing DCIS.  Radiation will further reduce her risk of ipsilateral recurrence.  We reviewed the role of endocrine therapy to reduce the risk of future malignancy in the ipsilateral and contralateral breast.  There is no evidence that this increases her " survival, but would decrease the odds of subsequent malignancy.  We discussed that the alternative would be to forgo endocrine therapy and proceed with close observation with exams and mammography.     We discussed side effects of tamoxifen and aromatase inhibitors in some detail. With respect to aromatase inhibitor therapy, we discussed schedule, planned duration of 5 years, and potential side effects including but not limited to fracture, bone loss, arthralgias, elevated cholesterol, risk for accelerated cardiovascular disease/stroke, and vasomotor symptoms. With respect to tamoxifen, we discussed schedule, planned duration of 5 years, and side effects to include: the risk for serious or fatal venous thromboembolism.  The medication should be held for elective surgeries and she should remain well hydrated and ambulate frequently in high risk travel situations.  We discussed the risk for endometrial hyperplasia/malignancies.She should have annual gynecology exams and seek attention for any abnormal vaginal bleeding. We discussed the risk of hepatotoxicity.  Because her last menstrual period was at the time of diagnosis she would need to be amenorrheic x 1 year with labs in the postmenopausal range to be candidate for aromatase inhibitor therapy and therefore prophylactic tamoxifen would be recommended over an AI as initial therapy.    -The patient is highly motivated to be aggressive at lowering her risk for future malignancy.  She is interested in adjuvant radiation and prophylactic endocrine therapy.  We did review her prior genetic testing and it  appears she had negative BRCA1 and BRCA2 testing in 2015, but not updated panel testing.  The patient states that if she did have a gene mutation positive, she would wish to consider additional prophylactic surgery.  Therefore we will send her for urgent genetic testing to be completed prior to radiation simulation.  If genetic testing negative and she proceeds with  "adjuvant radiation she will return on completion of adjuvant radiation for further discussion of endocrine therapy initiation.        Follow Up:   Patient Instructions   Collect genetics today and you will be contacted by genetic counseling to schedule an \"urgent\" genetic counseling appointment to expedite test results.   If no gene mutation, call Dr. Hamilton's nurse to schedule your simulation and we will meet back towards the end of radiation to discuss hormone blockers further  If yes gene mutation, revisit with myself and surgery regarding a new plan for potential further surgery.            Selena Cervantes MD  Hematology and Oncology     Time spent on the day of service was 45 minutes inclusive of time before, during, and after office visit on record review, medically appropriate history and physical, counseling patient, ordering tests, documenting in the medical record, and communicating with referring provider.  Time  "

## 2023-12-27 NOTE — TELEPHONE ENCOUNTER
Called pt to remind her of her upcoming genetic counseling appt tomorrow. Pt completed family hx online, but was unable to edit needed information. Discussed family hx and adjusted Progeny, as needed, by phone.

## 2023-12-27 NOTE — PROGRESS NOTES
-- DO NOT REPLY / DO NOT REPLY ALL --  -- Message is from the [facility]--    General Patient Message      Reason for Call: Please give me a call back concerning my medication.    Thank you    Caller Information       Type Contact Phone    12/15/2021 09:38 AM CST Phone (Incoming) Mecca Haywood (Self) [phone] (H)          Alternative phone number: n/a    Turnaround time given to caller:   \"This message will be sent to [state Provider's name]. The clinical team will fulfill your request as soon as they review your message.\"     CONSULTATION NOTE    NAME:      Francesca Manning  :                                                          1970  DATE OF CONSULTATION:                       23  REQUESTING PHYSICIAN:                   Steven Zaidi MD  REASON FOR CONSULTATION:           Further evaluation management of the patient's DCIS of the left breast for consideration of radiation treatments cTis N0 M0 pTis, NX, MX           BRIEF HISTORY:  Francesca Manning  is a very pleasant 53 y.o. female with a strong family history of breast cancer with both her mother and father expiring from breast cancer.  The patient also had an uncle with prostate cancer.  The patient is a status post genetic testing in  for BRCA1 and BRCA2 that was negative at that time.  The patient has been undergoing screening since then.  She had a mammogram in 2023 that was questionable and they recommended an MRI scan.  She had the MRI 2023 that showed findings concerning for a BI-RADS 4 lesion and recommended surgical biopsy.  The patient had a excisional biopsy with Dr. Zaidi 10/11/2023 this showed DCIS ER/LA positive that was intermediate grade and there was a bit of inferior margin that was still positive.  The patient was taken back for reexcision 2023 where and negative margins were identified.  No invasive disease was identified and margins were negative on final.    Patient recovered well and presents to discuss adjuvant treatment options with hormonal treatments and radiotherapy.    The patient reports that she has recovered well and has no major ongoing issues at this time.    She does have some chronic neck issues that she is working through and is going to see acupuncturist for this.    She has 2 children and is very functional.      BMI:  Body mass index is 27.23 kg/m².      Social History     Substance and Sexual Activity   Alcohol Use Never       Allergies   Allergen Reactions    Sulfa Antibiotics Rash       Social  History     Tobacco Use    Smoking status: Never    Smokeless tobacco: Never   Vaping Use    Vaping Use: Never used   Substance Use Topics    Alcohol use: Never    Drug use: Never         Past Medical History:   Diagnosis Date    Anxiety     Arthritis     Arthritis of neck currently    BRCA1 negative     BRCA2 negative     Breast cancer     Cervical arthritis 2023    Cervical disc disorder 2021    Chronic cervical radiculopathy 2023    Diabetes mellitus Gestational    Frozen shoulder approx year ago    GERD (gastroesophageal reflux disease)     Gestational diabetes     Hypercholesteremia     Low back pain neck    2021    Migraines     Mild depression 10/17/2022    Multiple gestation     PONV (postoperative nausea and vomiting)     Urinary tract infection     Varicella        family history includes Anxiety disorder in her paternal aunt and paternal grandmother; Arthritis in her father and maternal aunt; Asthma in her mother; Breast cancer (age of onset: 68) in her father; Breast cancer (age of onset: 71) in her mother; Cancer in her father, maternal aunt, maternal grandmother, and mother; Coronary artery disease in her paternal grandfather; Depression in her paternal aunt; Diabetes in her father, maternal grandmother, and paternal grandmother; Heart disease in her maternal grandfather and paternal grandfather; Hyperlipidemia in her father; Miscarriages / Stillbirths in her daughter and paternal aunt; Ovarian cancer in her maternal aunt and maternal grandmother; Stroke in her paternal grandfather; Thyroid disease in her paternal grandmother; Uterine cancer in her maternal grandmother; Vision loss in her mother.     Past Surgical History:   Procedure Laterality Date    ANTERIOR CERVICAL DISCECTOMY W/ FUSION N/A 2022    Procedure: CERVICAL DISCECTOMY ANTERIOR WITH FUSION C5-6;  Surgeon: Giovany Enriquez MD;  Location: [facility];  Service: Neurosurgery;  Laterality: N/A;     SECTION   "2010    Boy: Hitesh     SECTION PRIMARY  2002    Boy: [place]    CYSTOSCOPY      X 2    EYE SURGERY      RETINAL DETACHMENT X3    HYSTEROSCOPY W/ POLYPECTOMY  2022    with myosure lite, D&C    NECK SURGERY  Dec 13, 2022    SPINAL FUSION  Dec 13, 2022    WISDOM TOOTH EXTRACTION          Review of Systems   Respiratory:  Positive for cough.    Musculoskeletal:  Positive for neck pain and neck stiffness.        Right pain arm   Neurological:  Positive for headaches.   Psychiatric/Behavioral:  The patient is nervous/anxious.     A full 14 point review of systems was performed and was negative except as noted in the HPI.         Objective   VITAL SIGNS:   Vitals:    23 0959   BP: 131/73   Pulse: 101   Resp: 16   Temp: 97.5 °F (36.4 °C)   TempSrc: Temporal   SpO2: 97%   Weight: 76.5 kg (168 lb 11.2 oz)   Height: 167.6 cm (66\")   PainSc:   2        KPS       90%    Physical Exam  Vitals and nursing note reviewed.   Constitutional:       Appearance: She is well-developed.   HENT:      Head: Normocephalic and atraumatic.   Eyes:      Conjunctiva/sclera: Conjunctivae normal.      Pupils: Pupils are equal, round, and reactive to light.   Neck:      Comments: No obviously enlarged cervical or supraclavicular LAD.  Cardiovascular:      Comments: Patient well perfused. Non cyanotic. No prominent JVD. No pedal edema  Pulmonary:      Effort: Pulmonary effort is normal.      Breath sounds: Normal breath sounds. No stridor. No wheezing.   Abdominal:      General: There is no distension.      Palpations: Abdomen is soft.   Musculoskeletal:         General: Normal range of motion.      Cervical back: Normal range of motion and neck supple.      Comments: Right breast normal no masses.  No right axillary or supraclavicular adenopathy    Left breast status post surgery.  Well-healing scar around the superior medial nipple.  No masses palpable no erythema no tenderness.  No axillary adenopathy on the left " no supraclavicular adenopathy on the left palpable.   Skin:     General: Skin is warm and dry.   Neurological:      Mental Status: She is alert and oriented to person, place, and time.      Comments: Coordination intact.   Psychiatric:         Behavior: Behavior normal.         Thought Content: Thought content normal.         Judgment: Judgment normal.              The following portions of the patient's history were reviewed and updated as appropriate: allergies, current medications, past family history, past medical history, past social history, past surgical history, and problem list.  I have personally requested reviewed and interpreted the patient's images and radiology reports and pathology reports listed below:  XR Spine Cervical Flex & Ext Only    Result Date: 11/12/2023  Impression: 1.Postsurgical changes C5-6. Electronically Signed: Grant Marrero MD  11/12/2023 5:14 PM EST  Workstation ID: [id number]    Mammo Breast Specimen    Result Date: 10/24/2023  FINDINGS/IMPRESSION: 2D/3D intraoperative specimen radiographs were obtained. The Josy  localizer and targeted calcifications are contained within the sample. This was called to the operating room.    This report was finalized on 10/24/2023 1:47 PM by Elana Mcrae MD.         I reviewed the patient's pathology reports.  I reviewed the patient's genetic report.  I reviewed Dr. Zaidi's notes.  I discussed the case personally with Dr. Cervantes.  Assessment      IMPRESSION:       Patient is a very pleasant 53-year-old female with ER/KY positive DCIS.  The patient's lesion was very small around 1 cm total.  The patient did have intermediate to high-grade and focal necrosis on her pathology.  Her final pathology was negative but she did have an initial positive margin after her first JOSY.  The patient has recovered well and will be ready to start adjuvant treatment.  She would be a candidate for a 5 fraction APBI total dose of 30 Gray.    The patient is a  largely interested in breast conservation approach.  She has had genetics performed that showed no evidence of BRCA1 or BRCA2 but this was from 2015.  She will discuss with Dr. Cervantes if she needs further genetic testing.  She did briefly mention that she would consider prophylactic bilateral mastectomies if that was indicated for her however at this time without evidence of BRCA mutation it seems likely that she is still have an excellent outcome with a breast conservation strategy.    If she is interested in pursuing radiation treatments adjuvantly will get her scheduled for a planning scan and then can begin treatments fairly soon for her.  She will discuss genetics and adjuvant endocrine therapy with Dr. Cervantes later today.    We discussed the anticipated acute and chronic side effects from radiotherapy.  We compared and contrasted them to the treatments from decades prior.  We discussed DIBH for her given that her cancer is on the left side.    Greater than 1 hour was spent preparing for and coordinating this visit. >50% of the time was spent in direct face to face conversation with the patient teaching, answering question, and providing explanations regarding the patient's case.  The decision to treat the patient with radiation is a complex one and carries the risk of long-term side effects and complications.  The patient's malignancy represents a complicated life threatening condition that requires complex multidisciplinary management for treatment and followup.    RECOMMENDATIONS:        Left breast DCIS  -Intermediate to high-grade  -Focal comedonecrosis  -Initial Josy with positive margin inferiorly  -Ultimately achieved negative margins on reexcision 11/7/2023 with Dr. Zaidi  -ER/LA positive   -LA low positive  -Genetics in 2015 show no evidence of BRCA mutation  -may benefit from repeat more specific testing  -could benefit from 30 gy in 5 fr 2X per week  -will discuss endocrine options with   Billy Hamilton MD        Errors in dictation may reflect use of voice recognition software and not all errors in transcription may have been detected prior to signing.

## 2023-12-27 NOTE — PROGRESS NOTES
Hematology and Oncology [place]  Office number [phone]    Fax number [phone]     New Patient Office Visit      Date: 2023     Patient Name: Francesca Manning  MRN: [id number]  : 1970    Referring Physician: Dr. Dayn Harris MD    Chief Complaint: Left breast DCIS    Cancer [place]    History of Present Illness: Francesca Manning is a pleasant 53 y.o. female who presents today for evaluation of left breast DCIS.     Screening mammogram 2023 demonstrated microcalcifications in the retroareolar left breast which persisted on diagnostic mammogram.  These were not amenable to stereotactic biopsy.  She underwent a breast MRI 2023 which showed no suspicious mass, enhancement, or adenopathy.  She was referred for surgical excisional biopsy.    Excisional biopsy on 10/24/2023 demonstrated intermediate grade DCIS with microcalcifications and necrosis.  ER 90% and MA 10%.Margins positive.  She underwent reexcision 2023 with no residual DCIS.  Margins negative.      She is recovering well from surgery and presents to multidisciplinary breast clinic for an opinion regarding adjuvant management of recently diagnosed DCIS.  Postoperative breast pain is well controlled.  Denies leg swelling, shortness of breath, fever, cough, or other new symptoms.     Breast cancer risk profile:  Age of menarche:14   (stillborn at 25 weeks, Talbot Syndrome); Age of first live birth 31  Age of menopause: postmenopausal, stopped OCP 2 mo ago at diagnosis.  LMP 2023.    Family history of breast, ovarian, prostate or pancreatic cancer: Father  of metastatic breast cancer at 68;  mom breast cancer in her 70s. Maternal aunt ovary cancer in her 50s. Maternal grandmother  of metastatic cancer at 69  Genetics: [facility] 2-3 seeking heart regular negative [facility] BRCA 1, 2 gene sequencing .    Past Medical History:   Past Medical History:   Diagnosis Date    Anxiety      Arthritis     Arthritis of neck currently    BRCA1 negative     BRCA2 negative     Breast cancer     Cervical arthritis 2023    Cervical disc disorder 2021    Chronic cervical radiculopathy 2023    Diabetes mellitus Gestational    Frozen shoulder approx year ago    GERD (gastroesophageal reflux disease)     Gestational diabetes     Hypercholesteremia     Low back pain neck    2021    Migraines     Mild depression 10/17/2022    Multiple gestation     PONV (postoperative nausea and vomiting)     Urinary tract infection     Varicella        Past Surgical History:   Past Surgical History:   Procedure Laterality Date    ANTERIOR CERVICAL DISCECTOMY W/ FUSION N/A 2022    Procedure: CERVICAL DISCECTOMY ANTERIOR WITH FUSION C5-6;  Surgeon: Giovany Enriquez MD;  Location: [facility];  Service: Neurosurgery;  Laterality: N/A;     SECTION  2010    Boy: Hitesh     SECTION PRIMARY  2002    Boy: [place]    CYSTOSCOPY      X 2    EYE SURGERY      RETINAL DETACHMENT X3    HYSTEROSCOPY W/ POLYPECTOMY  2022    with myosure lite, D&C    NECK SURGERY  Dec 13, 2022    SPINAL FUSION  Dec 13, 2022    WISDOM TOOTH EXTRACTION         Family History:   Family History   Problem Relation Age of Onset    Cancer Mother         Breast    Asthma Mother     Breast cancer Mother 71    Vision loss Mother     Cancer Father         Breast    Arthritis Father     Breast cancer Father 68    Diabetes Father     Hyperlipidemia Father     Cancer Maternal Grandmother     Diabetes Maternal Grandmother     Ovarian cancer Maternal Grandmother     Uterine cancer Maternal Grandmother     Heart disease Maternal Grandfather     Diabetes Paternal Grandmother     Anxiety disorder Paternal Grandmother     Thyroid disease Paternal Grandmother     Coronary artery disease Paternal Grandfather     Stroke Paternal Grandfather     Heart disease Paternal Grandfather     Anxiety disorder Paternal Aunt      Depression Paternal Aunt     Miscarriages / Stillbirths Paternal Aunt     Arthritis Maternal Aunt     Cancer Maternal Aunt     Ovarian cancer Maternal Aunt     Miscarriages / Stillbirths Daughter     Colon cancer Neg Hx        Social History:   Social History     Socioeconomic History    Marital status:    Tobacco Use    Smoking status: Never    Smokeless tobacco: Never   Vaping Use    Vaping Use: Never used   Substance and Sexual Activity    Alcohol use: Never    Drug use: Never    Sexual activity: Yes     Partners: Male     Birth control/protection: Pill       Medications:     Current Outpatient Medications:     acetaminophen (TYLENOL) 500 MG tablet, , Disp: , Rfl:     atorvastatin (LIPITOR) 10 MG tablet, Take 1 tablet by mouth Daily. For cholesterol, Disp: 90 tablet, Rfl: 1    DULoxetine HCl 40 MG capsule delayed-release particles, TAKE ONE CAPSULE BY MOUTH EVERY DAY FOR NERVE PAIN (Patient taking differently: Weaning off), Disp: 30 capsule, Rfl: 0    famotidine (PEPCID) 20 MG tablet, Take 1 tab po QHS for acid reflux, Disp: 90 tablet, Rfl: 1    multivitamin with minerals tablet tablet, Take 1 tablet by mouth Daily., Disp: , Rfl:     omeprazole (priLOSEC) 40 MG capsule, Take 1 capsule by mouth Daily., Disp: , Rfl:     pregabalin (LYRICA) 75 MG capsule, Take 1 capsule by mouth 2 (Two) Times a Day., Disp: 60 capsule, Rfl: 2    spironolactone (ALDACTONE) 50 MG tablet, Take 1 tablet by mouth Daily., Disp: 90 tablet, Rfl: 1    SUMAtriptan (IMITREX) 25 MG tablet, Take one tablet at onset of headache. May repeat dose one time in 2 hours if headache not relieved., Disp: 12 tablet, Rfl: 5    tiZANidine (ZANAFLEX) 4 MG tablet, 1 tablet., Disp: , Rfl:     Vascepa 1 g capsule capsule, Take 2 tabs po BID For cholesterol, Disp: 360 capsule, Rfl: 1    Allergies:   Allergies   Allergen Reactions    Sulfa Antibiotics Rash       Objective     Vital Signs:   Vitals:    12/27/23 1126   BP: 131/73   Pulse: 101   Resp: 16  "  Temp: 97.5 °F (36.4 °C)   TempSrc: Temporal   SpO2: 97%   Weight: 75.8 kg (167 lb)   Height: 167.6 cm (65.98\")   PainSc:   2    Body mass index is 26.97 kg/m².   Pain Score    12/27/23 1126   PainSc:   2       ECOG Performance Status: 0    Physical Exam:   General: No acute distress. Well appearing   HEENT: Normocephalic, atraumatic. Sclera anicteric.   Neck: supple, no adenopathy.   Cardiovascular: regular rate and rhythm. No murmurs.   Respiratory: Normal rate. Clear to auscultation bilaterally  Abdomen: Soft, nontender, non distended with normoactive bowel sounds  Lymph: no cervical, supraclavicular or axillary adenopathy  Neuro: Alert and oriented x 3. No focal deficits.   Ext: Symmetric, no swelling.   Breast: lumpectomy incisions well healing.     Laboratory/Imaging Reviewed:   No visits with results within 2 Week(s) from this visit.   Latest known visit with results is:   Office Visit on 12/06/2023   Component Date Value Ref Range Status    Amphetamine Screen, Urine 12/06/2023 Negative  Negative Final    AMP INTERNAL CONTROL 12/06/2023 Passed  Passed Final    Barbiturates Screen, Urine 12/06/2023 Negative  Negative Final    BARBITURATE INTERNAL CONTROL 12/06/2023 Passed  Passed Final    Buprenorphine, Screen, Urine 12/06/2023 Negative  Negative Final    BUPRENORPHINE INTERNAL CONTROL 12/06/2023 Passed  Passed Final    Benzodiazepine Screen, Urine 12/06/2023 Negative  Negative Final    BENZODIAZEPINE INTERNAL CONTROL 12/06/2023 Passed  Passed Final    Cocaine Screen, Urine 12/06/2023 Negative  Negative Final    COCAINE INTERNAL CONTROL 12/06/2023 Passed  Passed Final    MDMA (ECSTASY) 12/06/2023 Negative  Negative Final    MDMA (ECSTASY) INTERNAL CONTROL 12/06/2023 Passed  Passed Final    Methamphetamine, Ur 12/06/2023 Negative  Negative Final    METHAMPHETAMINE INTERNAL CONTROL 12/06/2023 Passed  Passed Final    Methadone Screen, Urine 12/06/2023 Negative  Negative Final    METHADONE INTERNAL CONTROL " "12/06/2023 Passed  Passed Final    Opiate Screen 12/06/2023 Negative  Negative Final    OPIATES INTERNAL CONTROL 12/06/2023 Passed  Passed Final    Oxycodone Screen, Urine 12/06/2023 Negative  Negative Final    OXYCODONE INTERNAL CONTROL 12/06/2023 Passed  Passed Final    Phencyclidine (PCP), Urine 12/06/2023 Negative  Negative Final    PHENCYCLIDINE INTERNAL CONTROL 12/06/2023 Passed  Passed Final    THC, Screen, Urine 12/06/2023 Negative  Negative Final    THC INTERNAL CONTROL 12/06/2023 Passed  Passed Final    Lot Number 12/06/2023 [id number]   Final    Expiration Date 12/06/[id number]   Final       No results found.    Procedures    Assessment / Plan      Assessment/Plan:     Left breast DCIS, ER positive    Strong family history of breast cancer including male breast cancer and ovarian cancer  I reviewed the patient's history, imaging and pathology and discussed her case in multidisciplinary clinic with Dr. Hamilton of radiation oncology.  We reviewed that DCIS represents a non-invasive or \"precancerous\" condition. -She has had surgery to remove the existing DCIS.  Radiation will further reduce her risk of ipsilateral recurrence.  We reviewed the role of endocrine therapy to reduce the risk of future malignancy in the ipsilateral and contralateral breast.  There is no evidence that this increases her survival, but would decrease the odds of subsequent malignancy.  We discussed that the alternative would be to forgo endocrine therapy and proceed with close observation with exams and mammography.     We discussed side effects of tamoxifen and aromatase inhibitors in some detail. With respect to aromatase inhibitor therapy, we discussed schedule, planned duration of 5 years, and potential side effects including but not limited to fracture, bone loss, arthralgias, elevated cholesterol, risk for accelerated cardiovascular disease/stroke, and vasomotor symptoms. With respect to tamoxifen, we discussed schedule, " "planned duration of 5 years, and side effects to include: the risk for serious or fatal venous thromboembolism.  The medication should be held for elective surgeries and she should remain well hydrated and ambulate frequently in high risk travel situations.  We discussed the risk for endometrial hyperplasia/malignancies.She should have annual gynecology exams and seek attention for any abnormal vaginal bleeding. We discussed the risk of hepatotoxicity.  Because her last menstrual period was at the time of diagnosis she would need to be amenorrheic x 1 year with labs in the postmenopausal range to be candidate for aromatase inhibitor therapy and therefore prophylactic tamoxifen would be recommended over an AI as initial therapy.    -The patient is highly motivated to be aggressive at lowering her risk for future malignancy.  She is interested in adjuvant radiation and prophylactic endocrine therapy.  We did review her prior genetic testing and it  appears she had negative BRCA1 and BRCA2 testing in 2015, but not updated panel testing.  The patient states that if she did have a gene mutation positive, she would wish to consider additional prophylactic surgery.  Therefore we will send her for urgent genetic testing to be completed prior to radiation simulation.  If genetic testing negative and she proceeds with adjuvant radiation she will return on completion of adjuvant radiation for further discussion of endocrine therapy initiation.        Follow Up:   Patient Instructions   Collect genetics today and you will be contacted by genetic counseling to schedule an \"urgent\" genetic counseling appointment to expedite test results.   If no gene mutation, call Dr. Hamilton's nurse to schedule your simulation and we will meet back towards the end of radiation to discuss hormone blockers further  If yes gene mutation, revisit with myself and surgery regarding a new plan for potential further surgery.            Selena Cervantes, " MD  Hematology and Oncology     Time spent on the day of service was 45 minutes inclusive of time before, during, and after office visit on record review, medically appropriate history and physical, counseling patient, ordering tests, documenting in the medical record, and communicating with referring provider.  Time

## 2023-12-28 ENCOUNTER — CLINICAL SUPPORT (OUTPATIENT)
Dept: GENETICS | Facility: HOSPITAL | Age: 53
End: 2023-12-28
Payer: COMMERCIAL

## 2023-12-28 ENCOUNTER — PATIENT ROUNDING (BHMG ONLY) (OUTPATIENT)
Dept: ONCOLOGY | Facility: CLINIC | Age: 53
End: 2023-12-28
Payer: COMMERCIAL

## 2023-12-28 DIAGNOSIS — Z13.79 GENETIC TESTING: Primary | ICD-10-CM

## 2023-12-28 DIAGNOSIS — Z80.0 FAMILY HISTORY OF STOMACH CANCER: ICD-10-CM

## 2023-12-28 DIAGNOSIS — D05.12 DUCTAL CARCINOMA IN SITU (DCIS) OF LEFT BREAST: ICD-10-CM

## 2023-12-28 DIAGNOSIS — Z80.3 FAMILY HISTORY OF BREAST CANCER: ICD-10-CM

## 2023-12-28 NOTE — PROGRESS NOTES
Francesca Manning is a 53-year-old female who was seen for genetic counseling due to a personal and family history of cancer. Genetic counseling was provided via telephone. Ms. Manning was recently diagnosed with left-sided breast cancer at age 53. She is currently in the process of making a decision regarding treatment. Ms. Manning went through menopause at 52 and retains her uterus and ovaries. She has not had a normal colonoscopy but had a negative Cologuard test at 50. Ms. Manning was interested in discussing her risk of a hereditary cancer syndrome. She previously had negative testing through [facility] in 2015, which evaluated BRCA1/2 genes associated with hereditary breast cancer but does not include other breast cancer risk genes and other genes associated with other cancer risks. Due to her personal interest, Ms. Manning was interested in pursuing a multi-gene panel to evaluate her risk of cancer, therefore the CancerNext Expanded panel was ordered through [employer] which analyzes BRCA1/2 and 75 additional genes associated with an increased cancer risk. She had her blood drawn at [facility] on 12/27. Results from the high/moderate risk breast cancer genes are expected in 10 days, and results from the remainder of the panel are expected in 2-3 weeks.    Pertinent Family History: (See attached pedigree)   Father:   Breast cancer  Mother:  Breast cancer, 71  Mat Aunt:  Uterine cancer, 68  Mat Cousin:  Prostate cancer, 56  Mat Grandmother: Cervical cancer     Stomach cancer    We do not have medical records regarding the family history.    RISK ASSESSMENT:  Ms. Manning's personal and family history of breast cancer led to concern regarding a hereditary cancer syndrome. She meets NCCN guidelines criteria for hereditary breast cancer testing based on her father's diagnosis of breast cancer.     Genetic Counseling (30 minutes): We reviewed the family history information in detail. Cases of cancer follow three general  patterns: sporadic, familial, and hereditary. While most cancer is sporadic (75-80%), some cases appear to occur in family clusters. Familial cases may be due to a combination of shared genes and environmental factors among family members. In even fewer cases (5-10%), the risk for cancer is inherited, and the genes responsible for the increased cancer risk are known.      Family histories typical of hereditary cancer syndromes usually include multiple first- and second-degree relatives diagnosed with cancer types that define a syndrome. These cases tend to be diagnosed at younger-than-expected ages and can be bilateral or multifocal. The cancer in these families follows an   autosomal dominant inheritance pattern, which indicates the likely presence of a mutation in a cancer susceptibility gene. Children and siblings of an individual believed to carry this mutation have a 50% chance of inheriting that mutation, thereby inheriting the increased risk to develop cancer. These mutations can be passed down from the maternal or the paternal lineage.    Hereditary breast cancer accounts for 5-10% of all cases of breast cancer. A significant proportion of hereditary breast and ovarian cancer can be attributed to mutations in the BRCA1 and BRCA2 genes.  Mutations in these genes confer an increased risk for breast cancer, ovarian cancer, male breast cancer, prostate cancer, and pancreatic cancer. While Ms. Manning previously had negative BRCA1/2 testing, there are also other breast cancer genes and rarer hereditary cancer syndromes she did not have testing for. In order to get as much information as possible regarding her personal risks and potential risks for her family, Ms. Manning opted to pursue testing through a comprehensive panel that would look at several other genes known to increase the risk for cancer.    Genetic Testing:  The risks, benefits and limitations of genetic testing and implications for clinical management  following testing were reviewed. DNA test results can influence decisions regarding screening and prevention. Genetic testing can have significant psychological implications for both individuals and families. Also discussed was the possibility of employment and insurance discrimination based on genetic test results and the federal and states laws that are in place to prevent this (DUARTE).         We discussed panel testing, which would involve testing 77 genes associated with increased cancer risk. The benefits and limitations of genetic testing were discussed. The implications of a positive or negative test result were discussed. We also discussed the possibility that, in some cases, genetic test results may be informative or may be ambiguous due to the identification of a genetic variant of uncertain significance.  These variants may or may not be associated with an increased cancer risk. Given Ms. Manning's history, a negative test result would not eliminate all cancer risk to her family members, although the risk would not be as high as it would with positive genetic testing.     PLAN:  Genetic testing via the CancerNext Expanded panel through [facility] was ordered. She had her blood drawn on 12/27 at [facility]. Results from the high/moderate risk breast cancer genes are expected in 10 days, and results from the remainder of the panel are expected in 2-3 weeks. If she has any questions or concerns in the meantime, she is welcome to give our genetics team a call at [phone].    Michelle Tan MS, [facility], [facility]  Licensed Certified Genetic Counselor

## 2024-01-08 ENCOUNTER — TELEPHONE (OUTPATIENT)
Dept: ONCOLOGY | Facility: CLINIC | Age: 54
End: 2024-01-08
Payer: COMMERCIAL

## 2024-01-08 ENCOUNTER — OFFICE VISIT (OUTPATIENT)
Dept: PAIN MEDICINE | Facility: CLINIC | Age: 54
End: 2024-01-08
Payer: COMMERCIAL

## 2024-01-08 VITALS — WEIGHT: 169.4 LBS | HEIGHT: 66 IN | BODY MASS INDEX: 27.23 KG/M2

## 2024-01-08 DIAGNOSIS — M54.12 CHRONIC CERVICAL RADICULOPATHY: ICD-10-CM

## 2024-01-08 DIAGNOSIS — M54.12 CERVICAL RADICULOPATHY: Primary | ICD-10-CM

## 2024-01-08 PROCEDURE — 99204 OFFICE O/P NEW MOD 45 MIN: CPT | Performed by: STUDENT IN AN ORGANIZED HEALTH CARE EDUCATION/TRAINING PROGRAM

## 2024-01-08 RX ORDER — DULOXETINE 40 MG/1
40 CAPSULE, DELAYED RELEASE ORAL DAILY
Qty: 30 CAPSULE | Refills: 0 | Status: SHIPPED | OUTPATIENT
Start: 2024-01-08

## 2024-01-08 NOTE — PROGRESS NOTES
Referring Physician: Giovany Enriquez MD  [address]  [place] [id number]    Primary Physician: Dominique Blunt PA-C    CHIEF COMPLAINT or REASON FOR VISIT: Neck Pain (New patient)      Initial history of present illness on 01/08/2024:  Ms. Francesca Manning is 53 y.o. female who presents as a new patient referral for evaluation treatment chronic no other radiation of the right upper extremity.  Patient has past medical history of a C5-6 ACDF with Dr. Enriquez.  Prior to that surgery she is primary complaining of right neck and shoulder pain; subsequent to the surgery patient continues to complain of right upper extremity pain however this is now underneath the armpit and into this small and ring fingers.  She additionally reports a recent onset of right rib pain associated with severe coughing secondary to RSV.  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    She primarily complains of numbness tingling and pain underneath her right armpit and into the small and ring fingers.  She also reports reduced  strength in her right hand (right-hand-dominant).  She has had an EMG/NCV with Dr. Andrews which did not demonstrate any focal nerve entrapment or radiculopathy.  She was evaluated further by Dr. Enriquez who did not identify any role for surgical intervention and referred to pain management.  Patient denies any bowel or bladder dysfunction, lower extremity weakness, new onset saddle anesthesia or unexplained weight loss.   She has tried Cymbalta which was helpful; PCP discontinued Cymbalta in order to start pregabalin which has not been helpful.  She has noticed an increase in her pain since stopping Cymbalta.      Interval history:    Interventions:      Objective Pain Scoring:   BRIEF PAIN INVENTORY:  Total score:   Pain Score    01/08/24 0731   PainSc:   7   PainLoc: Rib Cage      PHQ-2: PHQ-2 Total  Score: 4  PHQ-9: PHQ-9: Brief Depression Severity Measure Score: 16  Opioid Risk Tool:         Review of Systems:   ROS negative except as otherwise noted     Past Medical History:   Past Medical History:   Diagnosis Date    Anxiety     Arthritis     Arthritis of neck currently    BRCA1 negative     BRCA2 negative     Breast cancer     Cervical arthritis 2023    Cervical disc disorder 2021    Chronic cervical radiculopathy 2023    Chronic pain disorder From surgery 22    Diabetes mellitus Gestational    Frozen shoulder approx year ago    GERD (gastroesophageal reflux disease)     Gestational diabetes     Headache, tension-type     Hypercholesteremia     Low back pain neck    2021    Migraines     Mild depression 10/17/2022    Multiple gestation     Neck pain     PONV (postoperative nausea and vomiting)     Urinary tract infection     Varicella          Past Surgical History:   Past Surgical History:   Procedure Laterality Date    ANTERIOR CERVICAL DISCECTOMY W/ FUSION N/A 2022    Procedure: CERVICAL DISCECTOMY ANTERIOR WITH FUSION C5-6;  Surgeon: Giovany Enriquez MD;  [facility]: [facility];  Service: Neurosurgery;  Laterality: N/A;     SECTION  2010    Boy: Hitesh     SECTION PRIMARY  2002    Boy: [place]    CYSTOSCOPY      X 2    EYE SURGERY      RETINAL DETACHMENT X3    HYSTEROSCOPY W/ POLYPECTOMY  2022    with myosure lite, D&C    NECK SURGERY  Dec 13, 2022    SPINAL FUSION  Dec 13, 2022    WISDOM TOOTH EXTRACTION  1986         Family History   Family History   Problem Relation Age of Onset    Cancer Mother         Breast    Asthma Mother     Breast cancer Mother 71    Vision loss Mother     Arthritis Mother     Cancer Father         Breast    Arthritis Father     Breast cancer Father 68    Diabetes Father     Hyperlipidemia Father     GI problems Father     Cancer Maternal Grandmother     Diabetes Maternal Grandmother     Ovarian cancer Maternal  Grandmother     Uterine cancer Maternal Grandmother     Heart disease Maternal Grandfather     Diabetes Paternal Grandmother     Anxiety disorder Paternal Grandmother     Thyroid disease Paternal Grandmother     Coronary artery disease Paternal Grandfather     Stroke Paternal Grandfather     Heart disease Paternal Grandfather     Alzheimer's disease Paternal Grandfather     Anxiety disorder Paternal Aunt     Depression Paternal Aunt     Miscarriages / Stillbirths Paternal Aunt     Arthritis Maternal Aunt     Cancer Maternal Aunt     Ovarian cancer Maternal Aunt     Miscarriages / Stillbirths Daughter     GI problems Daughter     Migraines Daughter     Colon cancer Neg Hx          Social History   Social History     Socioeconomic History    Marital status:    Tobacco Use    Smoking status: Never    Smokeless tobacco: Never   Vaping Use    Vaping Use: Never used   Substance and Sexual Activity    Alcohol use: Never    Drug use: Never    Sexual activity: Yes     Partners: Male     Birth control/protection: None     Comment: Post mentapausal        Medications:     Current Outpatient Medications:     acetaminophen (TYLENOL) 500 MG tablet, , Disp: , Rfl:     atorvastatin (LIPITOR) 10 MG tablet, Take 1 tablet by mouth Daily. For cholesterol, Disp: 90 tablet, Rfl: 1    DULoxetine HCl 40 MG capsule delayed-release particles, TAKE ONE CAPSULE BY MOUTH EVERY DAY FOR NERVE PAIN (Patient taking differently: Weaning off), Disp: 30 capsule, Rfl: 0    famotidine (PEPCID) 20 MG tablet, Take 1 tab po QHS for acid reflux, Disp: 90 tablet, Rfl: 1    multivitamin with minerals tablet tablet, Take 1 tablet by mouth Daily., Disp: , Rfl:     omeprazole (priLOSEC) 40 MG capsule, Take 1 capsule by mouth Daily., Disp: , Rfl:     pregabalin (LYRICA) 75 MG capsule, Take 1 capsule by mouth 2 (Two) Times a Day., Disp: 60 capsule, Rfl: 2    spironolactone (ALDACTONE) 50 MG tablet, Take 1 tablet by mouth Daily., Disp: 90 tablet, Rfl: 1     "SUMAtriptan (IMITREX) 25 MG tablet, Take one tablet at onset of headache. May repeat dose one time in 2 hours if headache not relieved., Disp: 12 tablet, Rfl: 5    tiZANidine (ZANAFLEX) 4 MG tablet, 1 tablet., Disp: , Rfl:     Vascepa 1 g capsule capsule, Take 2 tabs po BID For cholesterol, Disp: 360 capsule, Rfl: 1        Physical Exam:     Vitals:    01/08/24 0731   Weight: 76.8 kg (169 lb 6.4 oz)   Height: 167.6 cm (66\")   PainSc:   7   PainLoc: Rib Cage        General: Alert and oriented, No acute distress.   HEENT: Normocephalic, atraumatic.   Cardiovascular: No gross edema  Respiratory: Respirations are non-labored    Cervical Spine:   No masses or atrophy  Range of motion - Flexion normal. Extension normal. Lateral rotation normal.   Palpation - nontender   Spurling's - negative       Motor Exam:    Strength: Rate on 1-5 scale Right Left    C5-Elbow flexion, Deltoid 5 5    C6-Wrist extension 5 5    C7- Elbow / finger extension 5 5    C8- Finger flexion 5 5    T1- Intrinsics hand 5 5    Sensory Exam: Reduced sensation light touch with paresthesia in right C8 versus ulnar distribution    Neurologic: Cranial Nerves II-XII are grossly intact.   Webber’s -negative bilaterally   Psychiatric: Cooperative.   Gait: Normal   Assistive Devices: None    Imaging Studies:   No results found for this or any previous visit.      Impression & Plan:       01/08/2024: Francesca Manning is a 53 y.o. female with past medical history significant for family history breast cancer, GERD, anxiety, C5-6 ACDF with Dr. Enriquez who presents to the pain clinic for evaluation and treatment of chronic neck pain with radiation to right upper extremity and hand.  I personally reviewed and interpreted her cervical MRI dated April 18, 2023 demonstrating C5-6 ACDF; mild C5-6 right NFS; mild bilateral C6/7 NFS; patent canal.  Examination consistent with cervical radiculopathy versus ulnar neuropathy.  EMG/NCV was negative.  We discussed epidural steroid " injection to improve pain.  If greater than 50% relief for at least 2-3 months can consider repeat as needed every 3 to 4 months.  I had a discussion with the patient regarding the risks of the procedure including bleeding, infection, damage to surrounding structures.  We discussed the potential adverse effects of corticosteroid injection including flushing of the face, lipodystrophy, skin discoloration, elevated blood glucose, increased blood pressure.  Risks of frequent steroid administration include weight gain, hormonal changes, mood changes, osteoporosis.  If minimal or only transient benefit from epidural can consider spinal cord stimulator trial.    1. Cervical radiculopathy        PLAN:  1. Medication Recommendations: Recommend Voltaren topical, NSAIDs, Tylenol.  Can trial turmeric 500 mg twice daily if NSAID contraindicated.    2. Physical Therapy: Continue HEP    3. Psychological: defer.  Consider SCS clearance    4. Complementary and alternative (CAM) Therapies: Patient plans to try acupuncture    5. Labs/Diagnostic studies: None indicated     6. Imaging: MRI independently interpreted and reviewed with patient    7. Interventions: Schedule left paramedian cervical interlaminar epidural steroid injection (75918).  Consider SCS trial.    8. Referrals: None indicated     9. Records: Neurosurgical notes reviewed    10. Lifestyle goals:    Follow-up 6 weeks after injection      [facility]  Ji Vivar MD

## 2024-01-08 NOTE — TELEPHONE ENCOUNTER
Caller: Francesca Manning    Relationship: Self    Best call back number: [phone]    What test was performed: GENE TESTING    When was the test performed: 12/27/23    Where was the test performed: [facility]

## 2024-01-09 NOTE — TELEPHONE ENCOUNTER
Notified patient that this nurse spoke with Genetics department and the first part of testing should result 1/15/24 and the rest of it on 1/26/24.  Once this office receives results, we can let her know and make a plan based in recommendations by Dr. Cervantes.  Patient verbalized understanding and agreed with plan.

## 2024-01-09 NOTE — TELEPHONE ENCOUNTER
Calling patient that let her know that this nurse spoke with Genetics department and the first part of testing should result 1/15/24 and the rest of it on 1/26/24.  Once this office receives results, we can let her know and make a plan based in recommendations by Dr. Cervantes.  No answer received.  Left  requesting return call.

## 2024-01-11 ENCOUNTER — PATIENT ROUNDING (BHMG ONLY) (OUTPATIENT)
Dept: PAIN MEDICINE | Facility: CLINIC | Age: 54
End: 2024-01-11
Payer: COMMERCIAL

## 2024-01-11 NOTE — PROGRESS NOTES
A MY-CHART MESSAGE HAS BEEN SENT TO THE PATIENT FOR PATIENT ROUNDING WITH [facility].

## 2024-01-12 ENCOUNTER — DOCUMENTATION (OUTPATIENT)
Dept: GENETICS | Facility: HOSPITAL | Age: 54
End: 2024-01-12
Payer: COMMERCIAL

## 2024-01-12 NOTE — PROGRESS NOTES
Genetic testing was negative for mutations in BRCA1/2 and 6 additional high/moderate risk breast cancer genes. These results were discussed with the patient by telephone. This part of the panel was reported out first to help with surgical decision making. The remainder of the panel including lower risk genes, non-breast cancer related genes, and RNA analysis is still pending. Patient will be contacted with remainder of panel once available.

## 2024-01-14 DIAGNOSIS — N95.1 PERIMENOPAUSAL: Primary | ICD-10-CM

## 2024-01-15 ENCOUNTER — TELEPHONE (OUTPATIENT)
Dept: OBSTETRICS AND GYNECOLOGY | Facility: CLINIC | Age: 54
End: 2024-01-15
Payer: COMMERCIAL

## 2024-01-15 NOTE — TELEPHONE ENCOUNTER
Left message for pt to return call.  
Pt informed and stated understanding.  Pt stated that she has not been taking the pill recently.  Pt stated that she will get the labs done as soon as she can.  
She has sent me multiple my chart messages in the past few days. I responded but can we give her a call to clarify please. She had questions regarding a recent episode of bleeding.     Please let her know given she still had bleeding I would recommend a repeat FSH lab and a blood pregnancy test and estradiol level. She can also do a home urine pregnancy if feasible. The chance of pregnancy is low but given she still had a period we cannot definitely say menopause as she could technically be having escape ovulation.      In the interim I would recommend use of condoms for birth control.     It is tricky in the 50s when you are on the pill and still menstruating.      I have placed the order for the labs and we will contact her once they are finished.      Please let me know if she has any questions or concerns.     Thanks, Jen Narayanan MD    
No

## 2024-01-16 ENCOUNTER — TELEPHONE (OUTPATIENT)
Dept: GENETICS | Facility: HOSPITAL | Age: 54
End: 2024-01-16
Payer: COMMERCIAL

## 2024-01-16 NOTE — TELEPHONE ENCOUNTER
Attempted to contact Ms. Manning regarding her genetic test results. She didn't answer. Left VM asking her to return my call.

## 2024-01-17 ENCOUNTER — TELEPHONE (OUTPATIENT)
Dept: PAIN MEDICINE | Facility: CLINIC | Age: 54
End: 2024-01-17
Payer: COMMERCIAL

## 2024-01-17 LAB
ESTRADIOL SERPL-MCNC: 78.9 PG/ML
FSH SERPL-ACNC: 7.8 MIU/ML
HCG INTACT+B SERPL-ACNC: <1 MIU/ML

## 2024-01-17 NOTE — TELEPHONE ENCOUNTER
Returned the patient's call from  that was left for me. She wishes to try 4 weeks of acupuncture before deciding to have an injection with Dr Vivar. She wishes to keep her follow up with FRANCIS Magdaleno, on March 4.

## 2024-01-18 ENCOUNTER — DOCUMENTATION (OUTPATIENT)
Dept: GENETICS | Facility: HOSPITAL | Age: 54
End: 2024-01-18
Payer: COMMERCIAL

## 2024-01-18 DIAGNOSIS — E78.2 MIXED HYPERLIPIDEMIA: Primary | ICD-10-CM

## 2024-01-18 RX ORDER — ATORVASTATIN CALCIUM 20 MG/1
20 TABLET, FILM COATED ORAL DAILY
Qty: 90 TABLET | Refills: 1 | Status: SHIPPED | OUTPATIENT
Start: 2024-01-18

## 2024-01-18 NOTE — PROGRESS NOTES
Francesca Manning is a 53-year-old female who was seen for genetic counseling due to a personal and family history of cancer. Genetic counseling was provided via telephone. Ms. Manning was recently diagnosed with left-sided breast cancer at age 53. She is currently in the process of making a decision regarding treatment. Ms. Manning went through menopause at 52 and retains her uterus and ovaries. She has not had a normal colonoscopy but had a negative Cologuard test at 50. Ms. Manning was interested in discussing her risk of a hereditary cancer syndrome. She previously had negative testing through [facility] in 2015, which evaluated BRCA1/2 genes associated with hereditary breast cancer but does not include other breast cancer risk genes and other genes associated with other cancer risks. Due to her personal interest, Ms. Manning was interested in pursuing a multi-gene panel to evaluate her risk of cancer, therefore the CancerNext Expanded panel was ordered through [facility] [employer] which analyzes BRCA1/2 and 75 additional genes associated with an increased cancer risk. 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 These results were discussed with Ms. Manning by telephone on 1/18/24.    Pertinent Family History: (See attached pedigree)   Father:   Breast cancer  Mother:  Breast cancer, 71  Mat Aunt:  Uterine cancer, 68  Mat Cousin:  Prostate cancer, 56  Mat Grandmother: Cervical cancer     Stomach cancer    We do not have medical records regarding the family history.    RISK ASSESSMENT:  Ms. Manning's personal and family history of breast cancer led to concern regarding a hereditary cancer syndrome. She meets NCCN guidelines criteria for hereditary breast cancer testing based on her father's diagnosis of breast cancer.     Genetic Counseling (30 minutes): We reviewed the family history information in detail. Cases of cancer follow three general  patterns: sporadic, familial, and hereditary. While most cancer is sporadic (75-80%), some cases appear to occur in family clusters. Familial cases may be due to a combination of shared genes and environmental factors among family members. In even fewer cases (5-10%), the risk for cancer is inherited, and the genes responsible for the increased cancer risk are known.      Family histories typical of hereditary cancer syndromes usually include multiple first- and second-degree relatives diagnosed with cancer types that define a syndrome. These cases tend to be diagnosed at younger-than-expected ages and can be bilateral or multifocal. The cancer in these families follows an   autosomal dominant inheritance pattern, which indicates the likely presence of a mutation in a cancer susceptibility gene. Children and siblings of an individual believed to carry this mutation have a 50% chance of inheriting that mutation, thereby inheriting the increased risk to develop cancer. These mutations can be passed down from the maternal or the paternal lineage.    Hereditary breast cancer accounts for 5-10% of all cases of breast cancer. A significant proportion of hereditary breast and ovarian cancer can be attributed to mutations in the BRCA1 and BRCA2 genes.  Mutations in these genes confer an increased risk for breast cancer, ovarian cancer, male breast cancer, prostate cancer, and pancreatic cancer. While Ms. Manning previously had negative BRCA1/2 testing, there are also other breast cancer genes and rarer hereditary cancer syndromes she did not have testing for. In order to get as much information as possible regarding her personal risks and potential risks for her family, Ms. Manning opted to pursue testing through a comprehensive panel that would look at several other genes known to increase the risk for cancer.    Genetic Testing:  The risks, benefits and limitations of genetic testing and implications for clinical management  following testing were reviewed. DNA test results can influence decisions regarding screening and prevention. Genetic testing can have significant psychological implications for both individuals and families. Also discussed was the possibility of employment and insurance discrimination based on genetic test results and the federal and states laws that are in place to prevent this (DUARTE).         We discussed panel testing, which would involve testing 77 genes associated with increased cancer risk. The benefits and limitations of genetic testing were discussed. The implications of a positive or negative test result were discussed. We also discussed the possibility that, in some cases, genetic test results may be informative or may be ambiguous due to the identification of a genetic variant of uncertain significance.  These variants may or may not be associated with an increased cancer risk. Given Ms. Manning's history, a negative test result would not eliminate all cancer risk to her family members, although the risk would not be as high as it would with positive genetic testing.     TEST RESULTS:  Genetic testing was positive for one pathogenic mutation ([id number]) in the BLM gene (see attached results). Bloom syndrome is inherited in an autosomal recessive manner; therefore, Ms. Manning being identified as having one BLM mutation does not place her at the increased cancer risks that are seen when someone has two mutations, one in each copy of the gene. At this time, cancer risks for carriers of one BLM mutation are not known to be elevated. Therefore, there are currently no medical management guidelines for individuals with a single BLM mutation. We encourage Ms. Manning to recontact genetics periodically to determine whether there have been additional findings or screening guidelines established for individuals with one BLM mutation. We would be happy to see family members in our clinic to further discuss this  information and testing options. They can request a referral to [facility], and our coordinator will contact the individual to schedule an appointment once the referral is received. They can call [phone] to receive more information on how to place the referral. For family members who live elsewhere, there are genetic counselors at most [facility] centers. They can find a genetic counselor by visiting the National Society of Genetic Counselors website at www.nsgc.org or they can call our office and we would be happy to give them the contact information of the closest genetic counselor.    Unlike most hereditary cancer syndromes, Bloom syndrome is inherited in an autosomal recessive manner. This means that in order to have the condition an individual must inherit two non-working copies of the gene, one from each parent. Ms. Manning has one BLM mutation and, therefore, does not have a diagnosis of Bloom syndrome. Based on Ms. Manning's test results, each of her brother and sons have a 50% chance to also have the BLM mutation for which she tested positive. It is possible for Ms. Manning's family members to have inherited an additional mutation from either parent, therefore, rather than just looking for the identified mutation, full BLM analysis is indicated for family members. Genetic counseling and testing could be considered for Ms. Manning's family members.     Two variants of uncertain significance were also identified in the PALB2 gene and PMS2 gene. A VUS is a difference within a gene that may or may not impact the function of the gene. VUSs are not clinically actionable findings, and therefore this result does not impact management in any way. The majority of VUSs are ultimately reclassified to benign variants. The identification of a VUS is common in multigene panel testing, given the number of genes being evaluated and the presence of genetic variation in the population. If this  variant is ever reclassified, a new report will be issued by the laboratory and released directly to the ordering physician.    CANCER PREVENTION:  Despite the genetic test results that were negative for known deleterious mutations, Ms. Manning's female relatives may have a somewhat increased lifetime risk for breast cancer based on family history. Surveillance for individuals with a high lifetime risk of breast cancer (>20%), based on NCCN guidelines, would consist of semi-annual clinical breast exams and monthly self-breast exams starting by age 18 and annual mammography starting 10 years younger than the earliest diagnosis in the family. According to an American Cancer Society expert panel, annual breast MRI should be offered to women whose lifetime risk of breast cancer is 20-25 percent or more. Relatives may have a personalized risk assessment performed to quantify their breast cancer risk using a family history-based risk model, such as the Tyrer-Cuzick model.    PLAN: Genetic counseling remains available to Ms. Manning and her family. If she has any questions in the meantime, she is welcome to call us at [phone].     Michelle Tan MS, [facility] [place]  Licensed Certified Genetic Counselor       Cc: Francesca Cervantes MD

## 2024-01-19 ENCOUNTER — TELEPHONE (OUTPATIENT)
Dept: OBSTETRICS AND GYNECOLOGY | Facility: CLINIC | Age: 54
End: 2024-01-19
Payer: COMMERCIAL

## 2024-01-19 ENCOUNTER — TELEPHONE (OUTPATIENT)
Dept: ONCOLOGY | Facility: CLINIC | Age: 54
End: 2024-01-19
Payer: COMMERCIAL

## 2024-01-19 NOTE — TELEPHONE ENCOUNTER
----- Message from Selena Cervantes MD sent at 1/18/2024  8:26 PM EST -----  Regarding: I Based on these results, I recommend she proceed with radiation. 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

## 2024-01-19 NOTE — TELEPHONE ENCOUNTER
ANITA    Received a call from the patient in regards to finding out her test results for birth control. If someone could please give her a call at your earliest convenience, it would be appreciated.     Thank you kindly.

## 2024-01-19 NOTE — TELEPHONE ENCOUNTER
Notified patient per Dr. Cervantes.  Patient declined need to see her again at this point.  She stated she will call Dr. Hamilton's office to get in to see him and will let this office know what she finds out.  Dr. Cervantes notified.

## 2024-01-19 NOTE — TELEPHONE ENCOUNTER
PATIENT RETURNING A CALL. STATES SHE RECEIVED A VOICEMAIL TELLING HER TO ASK TO SPEAK TO THE TRIAGE NURSE.

## 2024-01-24 ENCOUNTER — OFFICE VISIT (OUTPATIENT)
Dept: FAMILY MEDICINE CLINIC | Facility: CLINIC | Age: 54
End: 2024-01-24
Payer: COMMERCIAL

## 2024-01-24 VITALS
SYSTOLIC BLOOD PRESSURE: 120 MMHG | DIASTOLIC BLOOD PRESSURE: 82 MMHG | BODY MASS INDEX: 27 KG/M2 | HEART RATE: 88 BPM | TEMPERATURE: 98.1 F | OXYGEN SATURATION: 96 % | RESPIRATION RATE: 14 BRPM | HEIGHT: 66 IN | WEIGHT: 168 LBS

## 2024-01-24 DIAGNOSIS — E66.3 OVERWEIGHT WITH BODY MASS INDEX (BMI) 25.0-29.9: ICD-10-CM

## 2024-01-24 DIAGNOSIS — E78.2 MIXED HYPERLIPIDEMIA: Primary | ICD-10-CM

## 2024-01-24 DIAGNOSIS — M54.12 CHRONIC CERVICAL RADICULOPATHY: ICD-10-CM

## 2024-01-24 DIAGNOSIS — R73.03 PREDIABETES: ICD-10-CM

## 2024-01-24 NOTE — PROGRESS NOTES
"Chief Complaint  Follow-up (Follow up from blood work and annual exam 12/2023)    Subjective        Francesca Manning presents to [facility]  History of Present Illness  Patient reports today to follow-up on recent diagnosis of prediabetes.  Patient reports she is followed by an integrative medicine physician and had blood work performed at their office.    Patient reports she is attempting to work on her weight and prediabetes with diet modifications.  Reports she is finding it difficult to know exactly what will help with weight loss and decrease the sugar.  Patient would like to see nutritionist.    Patient reports having chronic cervical radiculopathy states she is now followed by pain management.  Patient also reports acupuncture through her integrative medicine office and she continues PT.  Reports she currently feels better with this regimen.    Patient has ductal carcinoma in situ states she is followed by breast specialist and it has been recommended that she have radiation.  Patient states she has had genetic testing and was BRCA negative.      Objective   Vital Signs:  /82   Pulse 88   Temp 98.1 °F (36.7 °C) (Oral)   Resp 14   Ht 167.6 cm (66\")   Wt 76.2 kg (168 lb)   SpO2 96%   BMI 27.12 kg/m²   Estimated body mass index is 27.12 kg/m² as calculated from the following:    Height as of this encounter: 167.6 cm (66\").    Weight as of this encounter: 76.2 kg (168 lb).               Physical Exam  Vitals and nursing note reviewed.   Constitutional:       General: She is not in acute distress.     Appearance: She is not ill-appearing.   HENT:      Head: Normocephalic.      Right Ear: Tympanic membrane, ear canal and external ear normal.      Left Ear: Tympanic membrane, ear canal and external ear normal.      Nose: Nose normal.      Mouth/Throat:      Mouth: Mucous membranes are moist.   Eyes:      Pupils: Pupils are equal, round, and reactive to light. "   Cardiovascular:      Rate and Rhythm: Normal rate and regular rhythm.      Pulses: Normal pulses.   Pulmonary:      Effort: Pulmonary effort is normal. No respiratory distress.   Abdominal:      General: Bowel sounds are normal.      Palpations: Abdomen is soft.      Tenderness: There is no abdominal tenderness. There is no guarding or rebound.   Musculoskeletal:         General: Normal range of motion.      Cervical back: Normal range of motion.   Skin:     General: Skin is warm and dry.      Capillary Refill: Capillary refill takes less than 2 seconds.   Neurological:      General: No focal deficit present.      Mental Status: She is oriented to person, place, and time.   Psychiatric:         Mood and Affect: Mood normal.        Result Review :                     Assessment and Plan     Diagnoses and all orders for this visit:    1. Mixed hyperlipidemia (Primary)  Assessment & Plan:  Patient will start statin at 20 mg.  She will also continue to take herbal supplements from her integrative medicine physician.  Discussed diet modifications and provided a referral to a nutritionist.      2. Overweight with body mass index (BMI) 25.0-29.9  Assessment & Plan:  Patient's (Body mass index is 27.12 kg/m².) indicates that they are overweight with health conditions that include GERD . Weight is unchanged. BMI is is above average; BMI management plan is completed. We discussed low calorie, low carb based diet program, portion control, increasing exercise, and provided patient with referral to nutritionist .       3. Chronic cervical radiculopathy  Assessment & Plan:  Patient will continue current treatment plan with pain management and acupuncture through integrative medicine.  She continues PT regularly.      4. Prediabetes  Assessment & Plan:  Discussed the importance of diet modifications, and increased exercise.  Patient provided with referral to nutritionist.               Follow Up     Return in about 6 months  (around 7/24/2024).  Patient was given instructions and counseling regarding her condition or for health maintenance advice. Please see specific information pulled into the AVS if appropriate.

## 2024-01-24 NOTE — ASSESSMENT & PLAN NOTE
Discussed the importance of diet modifications, and increased exercise.  Patient provided with referral to nutritionist.

## 2024-01-24 NOTE — ASSESSMENT & PLAN NOTE
Patient will continue current treatment plan with pain management and acupuncture through integrative medicine.  She continues PT regularly.

## 2024-01-24 NOTE — ASSESSMENT & PLAN NOTE
Patient's (Body mass index is 27.12 kg/m².) indicates that they are overweight with health conditions that include GERD . Weight is unchanged. BMI is is above average; BMI management plan is completed. We discussed low calorie, low carb based diet program, portion control, increasing exercise, and provided patient with referral to nutritionist .

## 2024-01-24 NOTE — ASSESSMENT & PLAN NOTE
Patient will start statin at 20 mg.  She will also continue to take herbal supplements from her integrative medicine physician.  Discussed diet modifications and provided a referral to a nutritionist.

## 2024-01-26 ENCOUNTER — HOSPITAL ENCOUNTER (OUTPATIENT)
Dept: RADIATION ONCOLOGY | Facility: HOSPITAL | Age: 54
Setting detail: RADIATION/ONCOLOGY SERIES
Discharge: HOME | End: 2024-01-26
Payer: COMMERCIAL

## 2024-01-26 ENCOUNTER — OFFICE VISIT (OUTPATIENT)
Dept: RADIATION ONCOLOGY | Facility: HOSPITAL | Age: 54
End: 2024-01-26
Payer: COMMERCIAL

## 2024-01-26 ENCOUNTER — TELEPHONE (OUTPATIENT)
Dept: ONCOLOGY | Facility: CLINIC | Age: 54
End: 2024-01-26
Payer: COMMERCIAL

## 2024-01-26 VITALS
RESPIRATION RATE: 18 BRPM | SYSTOLIC BLOOD PRESSURE: 131 MMHG | OXYGEN SATURATION: 98 % | WEIGHT: 169.2 LBS | BODY MASS INDEX: 27.31 KG/M2 | HEART RATE: 82 BPM | DIASTOLIC BLOOD PRESSURE: 63 MMHG | TEMPERATURE: 98.1 F

## 2024-01-26 DIAGNOSIS — D05.12 DUCTAL CARCINOMA IN SITU (DCIS) OF LEFT BREAST: Primary | ICD-10-CM

## 2024-01-26 PROCEDURE — G0463 HOSPITAL OUTPT CLINIC VISIT: HCPCS

## 2024-01-26 NOTE — TELEPHONE ENCOUNTER
Caller: Brian Manning    Relationship: Self    Best call back number: [phone]    What is the best time to reach you: ANYTIME    Who are you requesting to speak with (clinical staff, provider,  specific staff member): CLINICAL    What was the call regarding: BRIAN RETURNING CALL FOR KARYN - PLEASE CALL TO ADVISE.

## 2024-01-26 NOTE — PROGRESS NOTES
Re-evaluation    NAME:      Francesca Manning  :                                                          1970  DATE OF CONSULTATION:                       23  REQUESTING PHYSICIAN:                   Steven Zaidi MD  REASON FOR CONSULTATION:           Further evaluation management of the patient's DCIS of the left breast for consideration of radiation treatments cTis N0 M0 pTis, NX, MX           BRIEF HISTORY:  Francesca Manning  is a very pleasant 53 y.o. female with a strong family history of breast cancer with both her mother and father expiring from breast cancer.  The patient also had an uncle with prostate cancer.  The patient is a status post genetic testing in  for BRCA1 and BRCA2 that was negative at that time.  The patient has been undergoing screening since then.  She had a mammogram in 2023 that was questionable and they recommended an MRI scan.  She had the MRI 2023 that showed findings concerning for a BI-RADS 4 lesion and recommended surgical biopsy.  The patient had a excisional biopsy with Dr. Zaidi 10/11/2023 this showed DCIS ER/MA positive that was intermediate grade and there was a bit of inferior margin that was still positive.  The patient was taken back for reexcision 2023 where and negative margins were identified.  No invasive disease was identified and margins were negative on final.  The patient discussed options with Dr. Cervantes.  She has been see integrative medicine and has had further genetic testing since I seen her last.    The patient reports that she has no plans for hormonal therapy.  Her genetics not reveal any targetable mutations or any actionable ones, but she did have a mutation of unknown known significance.    Patient still has some neck and arm pain but has full mobility.      BMI:  Body mass index is 27.31 kg/m².      Social History     Substance and Sexual Activity   Alcohol Use Never       Allergies   Allergen Reactions     Sulfa Antibiotics Rash       Social History     Tobacco Use    Smoking status: Never    Smokeless tobacco: Never   Vaping Use    Vaping Use: Never used   Substance Use Topics    Alcohol use: Never    Drug use: Never         Past Medical History:   Diagnosis Date    Anxiety     Arthritis     Arthritis of neck currently    BRCA1 negative     BRCA2 negative     Breast cancer     Cervical arthritis 07/11/2023    Cervical disc disorder Feb 2021    Chronic cervical radiculopathy 06/01/2023    Chronic pain disorder From surgery 12/13/22    Diabetes mellitus Gestational    Frozen shoulder approx year ago    GERD (gastroesophageal reflux disease)     Gestational diabetes     Headache, tension-type     Hypercholesteremia     Low back pain neck    12/2021    Migraines     Mild depression 10/17/2022    Multiple gestation     Neck pain     PONV (postoperative nausea and vomiting)     Urinary tract infection     Varicella        family history includes Alzheimer's disease in her paternal grandfather; Anxiety disorder in her paternal aunt and paternal grandmother; Arthritis in her father, maternal aunt, and mother; Asthma in her mother; Breast cancer (age of onset: 68) in her father; Breast cancer (age of onset: 71) in her mother; Cancer in her father, maternal aunt, maternal grandmother, and mother; Coronary artery disease in her paternal grandfather; Depression in her paternal aunt; Diabetes in her father, maternal grandmother, and paternal grandmother; GI problems in her daughter and father; Heart disease in her maternal grandfather and paternal grandfather; Hyperlipidemia in her father; Migraines in her daughter; Miscarriages / Stillbirths in her daughter and paternal aunt; Ovarian cancer in her maternal aunt and maternal grandmother; Stroke in her paternal grandfather; Thyroid disease in her paternal grandmother; Uterine cancer in her maternal grandmother; Vision loss in her mother.     Past Surgical History:   Procedure  Laterality Date    ANTERIOR CERVICAL DISCECTOMY W/ FUSION N/A 2022    Procedure: CERVICAL DISCECTOMY ANTERIOR WITH FUSION C5-6;  Surgeon: Giovany Enriquez MD;  Location: [facility];  Service: Neurosurgery;  Laterality: N/A;     SECTION  2010    Boy: Hitesh     SECTION PRIMARY  2002    Boy: [place]    CYSTOSCOPY      X 2    EYE SURGERY      RETINAL DETACHMENT X3    HYSTEROSCOPY W/ POLYPECTOMY  2022    with myosure lite, D&C    NECK SURGERY  Dec 13, 2022    SPINAL FUSION  Dec 13, 2022    WISDOM TOOTH EXTRACTION          Review of Systems   Respiratory:  Positive for cough.    Musculoskeletal:  Positive for neck pain and neck stiffness.        Right pain arm   Neurological:  Positive for headaches.   Psychiatric/Behavioral:  The patient is nervous/anxious.     A full 14 point review of systems was performed and was negative except as noted in the HPI.         Objective   VITAL SIGNS:   Vitals:    24 0838   BP: 131/63   Pulse: 82   Resp: 18   Temp: 98.1 °F (36.7 °C)   TempSrc: Temporal   SpO2: 98%   Weight: 76.7 kg (169 lb 3.2 oz)   PainSc: 0-No pain        KPS       90%    Physical Exam  Vitals and nursing note reviewed.   Constitutional:       Appearance: She is well-developed.   HENT:      Head: Normocephalic and atraumatic.   Eyes:      Conjunctiva/sclera: Conjunctivae normal.      Pupils: Pupils are equal, round, and reactive to light.   Neck:      Comments: No obviously enlarged cervical or supraclavicular LAD.  Cardiovascular:      Comments: Patient well perfused. Non cyanotic. No prominent JVD. No pedal edema  Pulmonary:      Effort: Pulmonary effort is normal.      Breath sounds: Normal breath sounds. No stridor. No wheezing.   Abdominal:      General: There is no distension.      Palpations: Abdomen is soft.   Musculoskeletal:         General: Normal range of motion.      Cervical back: Normal range of motion and neck supple.      Comments: Breast exam deferred  today as previously performed.   Skin:     General: Skin is warm and dry.   Neurological:      Mental Status: She is alert and oriented to person, place, and time.      Comments: Coordination intact.   Psychiatric:         Behavior: Behavior normal.         Thought Content: Thought content normal.         Judgment: Judgment normal.              The following portions of the patient's history were reviewed and updated as appropriate: allergies, current medications, past family history, past medical history, past social history, past surgical history, and problem list.  I have personally requested reviewed and interpreted the patient's images and radiology reports and pathology reports listed below:  XR Spine Cervical Flex & Ext Only    Result Date: 11/12/2023  Impression: 1.Postsurgical changes C5-6. Electronically Signed: Grant Marrero MD  11/12/2023 5:14 PM EST  Workstation ID: [id number]    Mammo Breast Specimen    Result Date: 10/24/2023  FINDINGS/IMPRESSION: 2D/3D intraoperative specimen radiographs were obtained. The Josy  localizer and targeted calcifications are contained within the sample. This was called to the operating room.    This report was finalized on 10/24/2023 1:47 PM by Elana Mcrae MD.         I reviewed the patient's pathology reports.    I reviewed the patient's new genetic report.  I reviewed Dr. Cervantes's note.  I reviewed the genetics counselor note.  I went over the patient's integrative medicine recommendations.    Assessment      IMPRESSION:       Patient is a very pleasant 53-year-old female with ER/AZ positive DCIS.  The patient's lesion was very small around 1 cm total.  The patient did have intermediate to high-grade and focal necrosis on her pathology.  Her final pathology was negative but she did have an initial positive margin after her first JOSY.  The patient has recovered well and will be ready to start adjuvant treatment.  She does not have a actionable/targetable  mutation and is not interested in mastectomies prophylactically.  She would be a candidate for a 5 fraction APBI/SBRT total dose of 30 Gray.  Recommend daily image guidance  Recommend VMAT  Recommend respiratory motion management    The patient's has recommended come off of OCPs and is considering options for birth control discussed this with Dr. Morataya.    I spent 38 minutes on the patient's case today.    RECOMMENDATIONS:        Left breast DCIS  -Intermediate to high-grade  -Focal comedonecrosis  -Initial Josy with positive margin inferiorly  -Ultimately achieved negative margins on reexcision 11/7/2023 with Dr. Zaidi  -ER/SD positive   -SD low positive  -Genetics in 2015 show no evidence of BRCA mutation  -Updated genetic shows no actionable targets  -could benefit from 30 gy in 5 fr 2X per week SBRT       Rd Hamilton MD        Errors in dictation may reflect use of voice recognition software and not all errors in transcription may have been detected prior to signing.

## 2024-01-26 NOTE — TELEPHONE ENCOUNTER
Notified patient that staff will discuss her my chart message she sent to this office with Dr. Cervantes when she returns on Monday, 1-29-24.  Patient verbalized understanding and stated there is no rush, she will wait for response back.

## 2024-01-29 ENCOUNTER — HOSPITAL ENCOUNTER (OUTPATIENT)
Dept: RADIATION ONCOLOGY | Facility: HOSPITAL | Age: 54
Discharge: HOME | End: 2024-01-29
Payer: COMMERCIAL

## 2024-01-29 PROCEDURE — 77332 RADIATION TREATMENT AID(S): CPT | Performed by: RADIOLOGY

## 2024-02-01 ENCOUNTER — HOSPITAL ENCOUNTER (OUTPATIENT)
Dept: RADIATION ONCOLOGY | Facility: HOSPITAL | Age: 54
Setting detail: RADIATION/ONCOLOGY SERIES
Discharge: HOME | End: 2024-02-01
Payer: COMMERCIAL

## 2024-02-01 PROCEDURE — 77301 RADIOTHERAPY DOSE PLAN IMRT: CPT | Performed by: RADIOLOGY

## 2024-02-01 PROCEDURE — 77338 DESIGN MLC DEVICE FOR IMRT: CPT | Performed by: RADIOLOGY

## 2024-02-01 PROCEDURE — 77300 RADIATION THERAPY DOSE PLAN: CPT | Performed by: RADIOLOGY

## 2024-02-12 DIAGNOSIS — M54.12 CHRONIC CERVICAL RADICULOPATHY: ICD-10-CM

## 2024-02-12 RX ORDER — DULOXETINE 40 MG/1
40 CAPSULE, DELAYED RELEASE ORAL DAILY
Qty: 30 CAPSULE | Refills: 0 | Status: SHIPPED | OUTPATIENT
Start: 2024-02-12

## 2024-02-15 ENCOUNTER — HOSPITAL ENCOUNTER (OUTPATIENT)
Dept: RADIATION ONCOLOGY | Facility: HOSPITAL | Age: 54
Discharge: HOME | End: 2024-02-15

## 2024-02-15 PROCEDURE — 77373 STRTCTC BDY RAD THER TX DLVR: CPT | Performed by: RADIOLOGY

## 2024-02-20 ENCOUNTER — HOSPITAL ENCOUNTER (OUTPATIENT)
Dept: RADIATION ONCOLOGY | Facility: HOSPITAL | Age: 54
Discharge: HOME | End: 2024-02-20

## 2024-02-20 PROCEDURE — 77373 STRTCTC BDY RAD THER TX DLVR: CPT | Performed by: RADIOLOGY

## 2024-02-22 ENCOUNTER — HOSPITAL ENCOUNTER (OUTPATIENT)
Dept: RADIATION ONCOLOGY | Facility: HOSPITAL | Age: 54
Discharge: HOME | End: 2024-02-22

## 2024-02-22 PROCEDURE — 77373 STRTCTC BDY RAD THER TX DLVR: CPT | Performed by: RADIOLOGY

## 2024-02-27 ENCOUNTER — HOSPITAL ENCOUNTER (OUTPATIENT)
Dept: RADIATION ONCOLOGY | Facility: HOSPITAL | Age: 54
Discharge: HOME | End: 2024-02-27

## 2024-02-27 PROCEDURE — 77373 STRTCTC BDY RAD THER TX DLVR: CPT | Performed by: RADIOLOGY

## 2024-02-29 ENCOUNTER — HOSPITAL ENCOUNTER (OUTPATIENT)
Dept: RADIATION ONCOLOGY | Facility: HOSPITAL | Age: 54
Discharge: HOME | End: 2024-02-29

## 2024-02-29 ENCOUNTER — TRANSCRIBE ORDERS (OUTPATIENT)
Dept: ADMINISTRATIVE | Facility: HOSPITAL | Age: 54
End: 2024-02-29
Payer: COMMERCIAL

## 2024-02-29 DIAGNOSIS — D05.12 INTRADUCTAL CARCINOMA IN SITU OF LEFT BREAST: Primary | ICD-10-CM

## 2024-02-29 PROCEDURE — 77373 STRTCTC BDY RAD THER TX DLVR: CPT | Performed by: RADIOLOGY

## 2024-03-04 ENCOUNTER — OFFICE VISIT (OUTPATIENT)
Dept: PAIN MEDICINE | Facility: CLINIC | Age: 54
End: 2024-03-04
Payer: COMMERCIAL

## 2024-03-04 VITALS — WEIGHT: 167.6 LBS | HEIGHT: 66 IN | BODY MASS INDEX: 26.93 KG/M2

## 2024-03-04 DIAGNOSIS — M54.12 CERVICAL RADICULOPATHY: ICD-10-CM

## 2024-03-04 DIAGNOSIS — M54.12 CHRONIC CERVICAL RADICULOPATHY: Primary | ICD-10-CM

## 2024-03-04 PROCEDURE — 99213 OFFICE O/P EST LOW 20 MIN: CPT

## 2024-03-04 RX ORDER — ATORVASTATIN CALCIUM 10 MG/1
10 TABLET, FILM COATED ORAL DAILY
COMMUNITY
Start: 2024-02-28

## 2024-03-04 NOTE — PROGRESS NOTES
Referring Physician: No referring provider defined for this encounter.    Primary Physician: Dominique Blunt PA-C    CHIEF COMPLAINT or REASON FOR VISIT: No chief complaint on file.      Initial history of present illness on 01/08/2024:  Ms. Francesca Manning is 53 y.o. female who presents as a new patient referral for evaluation treatment chronic no other radiation of the right upper extremity.  Patient has past medical history of a C5-6 ACDF with Dr. Enriquez.  Prior to that surgery she is primary complaining of right neck and shoulder pain; subsequent to the surgery patient continues to complain of right upper extremity pain however this is now underneath the armpit and into this small and ring fingers.  She additionally reports a recent onset of right rib pain associated with severe coughing secondary to RSV.  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    She primarily complains of numbness tingling and pain underneath her right armpit and into the small and ring fingers.  She also reports reduced  strength in her right hand (right-hand-dominant).  She has had an EMG/NCV with Dr. Andrews which did not demonstrate any focal nerve entrapment or radiculopathy.  She was evaluated further by Dr. Enriquez who did not identify any role for surgical intervention and referred to pain management.  Patient denies any bowel or bladder dysfunction, lower extremity weakness, new onset saddle anesthesia or unexplained weight loss.   She has tried Cymbalta which was helpful; PCP discontinued Cymbalta in order to start pregabalin which has not been helpful.  She has noticed an increase in her pain since stopping Cymbalta.      Interval history: Patient returns to clinic today.  At her last office visit she was scheduled to undergo a cervical epidural steroid injection however she wanted to try acupuncture before undergoing this procedure.   Today,she continues to complain of chronic neck pain with radiation to the right upper extremity.  Her symptoms are pretty well-controlled with Lyrica and Cymbalta.  She reports that she recently had a biopsy of her breast which revealed cancer.  She follows up with her oncologist tomorrow, and is expected to start tamoxifen.  She reports she has a friend that followed with this particular oncologist and they would not allow them to be on Cymbalta and pregabalin while taking the oral chemotherapy.  We discussed variety of treatment options such as a cervical epidural steroid injection versus SCS trial.  Patient states she would like to get her other health problems controlled before undergoing any sort of the procedure.  She reportedly has a week left of her Cymbalta but wants to wait refill of this medication until she sees her oncologist.  I encouraged patient to reach out to us if that medication needs refilled.     Interventions:      Objective Pain Scoring:   BRIEF PAIN INVENTORY:  Total score:   Pain Score    03/04/24 0810   PainSc:   3      PHQ-2: PHQ-2 Total Score: 1  PHQ-9: PHQ-9: Brief Depression Severity Measure Score: 1  Opioid Risk Tool:         Review of Systems:   ROS negative except as otherwise noted     Past Medical History:   Past Medical History:   Diagnosis Date    Anxiety     Arthritis     Arthritis of neck currently    BRCA1 negative     BRCA2 negative     Breast cancer     Cancer     Cervical arthritis 07/11/2023    Cervical disc disorder Feb 2021    Chronic cervical radiculopathy 06/01/2023    Chronic pain disorder From surgery 12/13/22    Diabetes mellitus Gestational    Frozen shoulder approx year ago    GERD (gastroesophageal reflux disease)     Gestational diabetes     Headache, tension-type     Hypercholesteremia     Low back pain neck    12/2021    Migraines     Mild depression 10/17/2022    Multiple gestation     Neck pain     PONV (postoperative nausea and vomiting)     Urinary tract  infection     Varicella          Past Surgical History:   Past Surgical History:   Procedure Laterality Date    ANTERIOR CERVICAL DISCECTOMY W/ FUSION N/A 2022    Procedure: CERVICAL DISCECTOMY ANTERIOR WITH FUSION C5-6;  Surgeon: Giovany Enriquez MD;  Location: [facility];  Service: Neurosurgery;  Laterality: N/A;     SECTION  2010    Boy: Hitesh     SECTION PRIMARY  2002    Boy: [place]    CYSTOSCOPY      X 2    EYE SURGERY      RETINAL DETACHMENT X3    HYSTEROSCOPY W/ POLYPECTOMY  2022    with myosure lite, D&C    NECK SURGERY  Dec 13, 2022    SPINAL FUSION  Dec 13, 2022    WISDOM TOOTH EXTRACTION           Family History   Family History   Problem Relation Age of Onset    Cancer Mother         Breast    Asthma Mother     Breast cancer Mother 71    Vision loss Mother     Arthritis Mother     Cancer Father         Breast    Arthritis Father     Breast cancer Father 68    Diabetes Father     Hyperlipidemia Father     GI problems Father     Cancer Maternal Grandmother     Diabetes Maternal Grandmother     Ovarian cancer Maternal Grandmother     Uterine cancer Maternal Grandmother     Heart disease Maternal Grandfather     Diabetes Paternal Grandmother     Anxiety disorder Paternal Grandmother     Thyroid disease Paternal Grandmother     Coronary artery disease Paternal Grandfather     Stroke Paternal Grandfather     Heart disease Paternal Grandfather     Alzheimer's disease Paternal Grandfather     Anxiety disorder Paternal Aunt     Depression Paternal Aunt     Miscarriages / Stillbirths Paternal Aunt     Arthritis Maternal Aunt     Cancer Maternal Aunt     Ovarian cancer Maternal Aunt     Miscarriages / Stillbirths Daughter     GI problems Daughter     Migraines Daughter     Colon cancer Neg Hx          Social History   Social History     Socioeconomic History    Marital status:    Tobacco Use    Smoking status: Never    Smokeless tobacco: Never   Vaping Use     "Vaping status: Never Used   Substance and Sexual Activity    Alcohol use: Never    Drug use: Never    Sexual activity: Yes     Partners: Male     Birth control/protection: None     Comment: currently discussing lab results        Medications:     Current Outpatient Medications:     atorvastatin (LIPITOR) 10 MG tablet, Take 1 tablet by mouth Daily. for cholesterol, Disp: , Rfl:     DULoxetine HCl 40 MG capsule delayed-release particles, Take 1 capsule by mouth Daily., Disp: 30 capsule, Rfl: 0    famotidine (PEPCID) 20 MG tablet, Take 1 tab po QHS for acid reflux, Disp: 90 tablet, Rfl: 1    NON FORMULARY, Tudca, Disp: , Rfl:     NON FORMULARY, [place] Complex, Disp: , Rfl:     NON FORMULARY, Selenium, Disp: , Rfl:     NON FORMULARY, molybenum, Disp: , Rfl:     omeprazole (priLOSEC) 40 MG capsule, Take 1 capsule by mouth Daily., Disp: , Rfl:     pregabalin (LYRICA) 75 MG capsule, Take 1 capsule by mouth 2 (Two) Times a Day., Disp: 60 capsule, Rfl: 2    spironolactone (ALDACTONE) 50 MG tablet, Take 1 tablet by mouth Daily., Disp: 90 tablet, Rfl: 1    SUMAtriptan (IMITREX) 25 MG tablet, Take one tablet at onset of headache. May repeat dose one time in 2 hours if headache not relieved., Disp: 12 tablet, Rfl: 5    tiZANidine (ZANAFLEX) 4 MG tablet, 1 tablet., Disp: , Rfl:     Vascepa 1 g capsule capsule, Take 2 tabs po BID For cholesterol, Disp: 360 capsule, Rfl: 1        Physical Exam:     Vitals:    03/04/24 0810   Weight: 76 kg (167 lb 9.6 oz)   Height: 167.6 cm (66\")   PainSc:   3        General: Alert and oriented, No acute distress.   HEENT: Normocephalic, atraumatic.   Cardiovascular: No gross edema  Respiratory: Respirations are non-labored    Cervical Spine:   No masses or atrophy  Range of motion - Flexion normal. Extension normal. Lateral rotation normal.   Palpation - nontender   Spurling's - negative       Motor Exam:    Strength: Rate on 1-5 scale Right Left    C5-Elbow flexion, Deltoid 5 5    C6-Wrist " extension 5 5    C7- Elbow / finger extension 5 5    C8- Finger flexion 5 5    T1- Intrinsics hand 5 5    Sensory Exam: Reduced sensation light touch with paresthesia in right C8 versus ulnar distribution    Neurologic: Cranial Nerves II-XII are grossly intact.   Webber’s -negative bilaterally   Psychiatric: Cooperative.   Gait: Normal   Assistive Devices: None    Physical exam is consistent and accurate as of 3/4/2024    Imaging Studies:   No results found for this or any previous visit.      Impression & Plan:       01/08/2024: Francesca Manning is a 53 y.o. female with past medical history significant for family history breast cancer, GERD, anxiety, C5-6 ACDF with Dr. Enriquez who presents to the pain clinic for evaluation and treatment of chronic neck pain with radiation to right upper extremity and hand.  I personally reviewed and interpreted her cervical MRI dated April 18, 2023 demonstrating C5-6 ACDF; mild C5-6 right NFS; mild bilateral C6/7 NFS; patent canal.  Examination consistent with cervical radiculopathy versus ulnar neuropathy.  EMG/NCV was negative.  We discussed epidural steroid injection to improve pain.  If greater than 50% relief for at least 2-3 months can consider repeat as needed every 3 to 4 months.  I had a discussion with the patient regarding the risks of the procedure including bleeding, infection, damage to surrounding structures.  We discussed the potential adverse effects of corticosteroid injection including flushing of the face, lipodystrophy, skin discoloration, elevated blood glucose, increased blood pressure.  Risks of frequent steroid administration include weight gain, hormonal changes, mood changes, osteoporosis.  If minimal or only transient benefit from epidural can consider spinal cord stimulator trial.  3/4/2024: Symptoms well-controlled with Cymbalta and pregabalin.  Can consider WAYLON.    1. Chronic cervical radiculopathy    2. Cervical radiculopathy          PLAN:  1.  Medication Recommendations: Recommend Voltaren topical, NSAIDs, Tylenol.  Can trial turmeric 500 mg twice daily if NSAID contraindicated.  -Continue Cymbalta 40 mg daily.  Will refill medication if patient is able to take both Lyrica and Cymbalta while undergoing oral chemotherapy.    2. Physical Therapy: Continue HEP    3. Psychological: defer.  Consider SCS clearance    4. Complementary and alternative (CAM) Therapies:     5. Labs/Diagnostic studies: None indicated     6. Imagin. Interventions: Can consider left paramedian cervical interlaminar epidural steroid injection (11041).  Consider SCS trial if minimal or transient benefit.    8. Referrals: None indicated     9. Records:     10. Lifestyle goals:    Follow-up 3-4 months      [facility]  Jose Jain PA-C

## 2024-03-05 ENCOUNTER — LAB (OUTPATIENT)
Dept: LAB | Facility: HOSPITAL | Age: 54
End: 2024-03-05
Payer: COMMERCIAL

## 2024-03-05 ENCOUNTER — OFFICE VISIT (OUTPATIENT)
Dept: ONCOLOGY | Facility: CLINIC | Age: 54
End: 2024-03-05
Payer: COMMERCIAL

## 2024-03-05 VITALS
SYSTOLIC BLOOD PRESSURE: 133 MMHG | OXYGEN SATURATION: 97 % | WEIGHT: 167 LBS | BODY MASS INDEX: 26.84 KG/M2 | HEART RATE: 100 BPM | TEMPERATURE: 98.2 F | DIASTOLIC BLOOD PRESSURE: 63 MMHG | HEIGHT: 66 IN

## 2024-03-05 DIAGNOSIS — Z17.0 MALIGNANT NEOPLASM OF RIGHT BREAST IN FEMALE, ESTROGEN RECEPTOR POSITIVE, UNSPECIFIED SITE OF BREAST: Primary | ICD-10-CM

## 2024-03-05 DIAGNOSIS — C50.911 MALIGNANT NEOPLASM OF RIGHT BREAST IN FEMALE, ESTROGEN RECEPTOR POSITIVE, UNSPECIFIED SITE OF BREAST: Primary | ICD-10-CM

## 2024-03-05 DIAGNOSIS — Z17.0 MALIGNANT NEOPLASM OF RIGHT BREAST IN FEMALE, ESTROGEN RECEPTOR POSITIVE, UNSPECIFIED SITE OF BREAST: ICD-10-CM

## 2024-03-05 DIAGNOSIS — C50.911 MALIGNANT NEOPLASM OF RIGHT BREAST IN FEMALE, ESTROGEN RECEPTOR POSITIVE, UNSPECIFIED SITE OF BREAST: ICD-10-CM

## 2024-03-05 LAB
ALBUMIN SERPL-MCNC: 4.7 G/DL (ref 3.5–5.2)
ALBUMIN/GLOB SERPL: 1.7 G/DL
ALP SERPL-CCNC: 121 U/L (ref 39–117)
ALT SERPL W P-5'-P-CCNC: 31 U/L (ref 1–33)
ANION GAP SERPL CALCULATED.3IONS-SCNC: 8 MMOL/L (ref 5–15)
AST SERPL-CCNC: 27 U/L (ref 1–32)
BASOPHILS # BLD AUTO: 0.04 10*3/MM3 (ref 0–0.2)
BASOPHILS NFR BLD AUTO: 0.5 % (ref 0–1.5)
BILIRUB SERPL-MCNC: 0.3 MG/DL (ref 0–1.2)
BUN SERPL-MCNC: 12 MG/DL (ref 6–20)
BUN/CREAT SERPL: 16.4 (ref 7–25)
CALCIUM SPEC-SCNC: 9 MG/DL (ref 8.6–10.5)
CHLORIDE SERPL-SCNC: 103 MMOL/L (ref 98–107)
CO2 SERPL-SCNC: 28 MMOL/L (ref 22–29)
CREAT SERPL-MCNC: 0.73 MG/DL (ref 0.57–1)
DEPRECATED RDW RBC AUTO: 44.2 FL (ref 37–54)
EGFRCR SERPLBLD CKD-EPI 2021: 98.5 ML/MIN/1.73
EOSINOPHIL # BLD AUTO: 0.34 10*3/MM3 (ref 0–0.4)
EOSINOPHIL NFR BLD AUTO: 4.1 % (ref 0.3–6.2)
ERYTHROCYTE [DISTWIDTH] IN BLOOD BY AUTOMATED COUNT: 13 % (ref 12.3–15.4)
GLOBULIN UR ELPH-MCNC: 2.8 GM/DL
GLUCOSE SERPL-MCNC: 83 MG/DL (ref 65–99)
HCT VFR BLD AUTO: 44.1 % (ref 34–46.6)
HGB BLD-MCNC: 14.9 G/DL (ref 12–15.9)
IMM GRANULOCYTES # BLD AUTO: 0.01 10*3/MM3 (ref 0–0.05)
IMM GRANULOCYTES NFR BLD AUTO: 0.1 % (ref 0–0.5)
LYMPHOCYTES # BLD AUTO: 1.99 10*3/MM3 (ref 0.7–3.1)
LYMPHOCYTES NFR BLD AUTO: 23.9 % (ref 19.6–45.3)
MCH RBC QN AUTO: 30.4 PG (ref 26.6–33)
MCHC RBC AUTO-ENTMCNC: 33.8 G/DL (ref 31.5–35.7)
MCV RBC AUTO: 90 FL (ref 79–97)
MONOCYTES # BLD AUTO: 0.85 10*3/MM3 (ref 0.1–0.9)
MONOCYTES NFR BLD AUTO: 10.2 % (ref 5–12)
NEUTROPHILS NFR BLD AUTO: 5.08 10*3/MM3 (ref 1.7–7)
NEUTROPHILS NFR BLD AUTO: 61.2 % (ref 42.7–76)
PLATELET # BLD AUTO: 288 10*3/MM3 (ref 140–450)
PMV BLD AUTO: 9.9 FL (ref 6–12)
POTASSIUM SERPL-SCNC: 3.9 MMOL/L (ref 3.5–5.2)
PROT SERPL-MCNC: 7.5 G/DL (ref 6–8.5)
RBC # BLD AUTO: 4.9 10*6/MM3 (ref 3.77–5.28)
SODIUM SERPL-SCNC: 139 MMOL/L (ref 136–145)
WBC NRBC COR # BLD AUTO: 8.31 10*3/MM3 (ref 3.4–10.8)

## 2024-03-05 PROCEDURE — 85025 COMPLETE CBC W/AUTO DIFF WBC: CPT

## 2024-03-05 PROCEDURE — 36415 COLL VENOUS BLD VENIPUNCTURE: CPT

## 2024-03-05 PROCEDURE — 99214 OFFICE O/P EST MOD 30 MIN: CPT | Performed by: INTERNAL MEDICINE

## 2024-03-05 PROCEDURE — 80053 COMPREHEN METABOLIC PANEL: CPT

## 2024-03-05 RX ORDER — TAMOXIFEN CITRATE 20 MG/1
20 TABLET ORAL DAILY
Qty: 30 TABLET | Refills: 2 | Status: SHIPPED | OUTPATIENT
Start: 2024-03-05 | End: 2024-06-03

## 2024-03-05 NOTE — PROGRESS NOTES
Hematology and Oncology [place]  Office number [phone]    Fax number [phone]     Follow up     Date: 24    Patient Name: Francesca Manning  MRN: [id number]  : 1970    Referring Physician: Dr. Dyan Harris MD    Chief Complaint: Left breast DCIS    Cancer [place]    History of Present Illness: Francesca Manning is a pleasant 53 y.o. female who presents today for evaluation of left breast DCIS.     Screening mammogram 2023 demonstrated microcalcifications in the retroareolar left breast which persisted on diagnostic mammogram.  These were not amenable to stereotactic biopsy.  She underwent a breast MRI 2023 which showed no suspicious mass, enhancement, or adenopathy.  She was referred for surgical excisional biopsy.    Excisional biopsy on 10/24/2023 demonstrated intermediate grade DCIS with microcalcifications and necrosis.  ER 90% and OR 10%.Margins positive.  She underwent reexcision 2023 with no residual DCIS.  Margins negative.      She is recovering well from surgery and presents to multidisciplinary breast clinic for an opinion regarding adjuvant management of recently diagnosed DCIS.  Postoperative breast pain is well controlled.  Denies leg swelling, shortness of breath, fever, cough, or other new symptoms.     Breast cancer risk profile:  Age of menarche:14   (stillborn at 25 weeks, Talbot Syndrome); Age of first live birth 31  Age of menopause: postmenopausal, stopped OCP 2 mo ago at diagnosis.  LMP 2023.    Family history of breast, ovarian, prostate or pancreatic cancer: Father  of metastatic breast cancer at 68;  mom breast cancer in her 70s. Maternal aunt ovary cancer in her 50s. Maternal grandmother  of metastatic cancer at 69  Genetics: negative Myriad BRCA 1, 2 gene sequencing .  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     Treatment history:  Radiation completed 2/29/24.   Tamoxifen, planning 3/ 2024    Interval history:  She recently completed adjuvant radiation.  She endorses minimal radiation skin changes/tenderness.  She does have some pain in her axilla which she attributes to prior surgical changes.  She has a h/o C5/6 fusion 12/2022 with Dr. Enriquez for pain and numbness in her right arm and her lateral 2 digits that is persistent.  She has bilateral numbness in her toes.  She is also having numbness/pain in her left toes that she thinks is more arthritis related as she has associated stiffness that improves with activity. She is not interested in working this up further at this time but may consider in future if her symptoms worsen..  She does continue on Lyrica and Cymbalta for this issue but is planning to attempt a trial of weaning off of Cymbalta.     She remains off hormonal contraception.  Her periods have been regular since stopping OCPs.   considering vasectomy and she is considering an IUD.  She recently pursued pharmacodynamic testing independently which is notable [facility]2D6 [facility]1/*1 [facility]    She recently saw an integrative medicine physician to recommended multiple supplements but she is planning to hold off on these at this time.  She does have questions regarding supplement use.  She also was noted to have mild LFT elevation on lab test performed by that provider in January.    Past Medical History:   Past Medical History:   Diagnosis Date    Anxiety     Arthritis     Arthritis of neck currently    BRCA1 negative     BRCA2 negative     Breast cancer     Cancer     Cervical arthritis 07/11/2023    Cervical disc disorder Feb 2021    Chronic cervical radiculopathy 06/01/2023    Chronic pain disorder From surgery 12/13/22    Diabetes mellitus Gestational    Frozen shoulder  approx year ago    GERD (gastroesophageal reflux disease)     Gestational diabetes     Headache, tension-type     Hypercholesteremia     Low back pain neck    2021    Migraines     Mild depression 10/17/2022    Multiple gestation     Neck pain     PONV (postoperative nausea and vomiting)     Urinary tract infection     Varicella        Past Surgical History:   Past Surgical History:   Procedure Laterality Date    ANTERIOR CERVICAL DISCECTOMY W/ FUSION N/A 2022    Procedure: CERVICAL DISCECTOMY ANTERIOR WITH FUSION C5-6;  Surgeon: Giovany Enriquez MD;  Location: [facility];  Service: Neurosurgery;  Laterality: N/A;     SECTION  2010    Boy: Hitesh     SECTION PRIMARY  2002    Boy: [place]    CYSTOSCOPY      X 2    EYE SURGERY      RETINAL DETACHMENT X3    HYSTEROSCOPY W/ POLYPECTOMY  2022    with myosure lite, D&C    NECK SURGERY  Dec 13, 2022    SPINAL FUSION  Dec 13, 2022    WISDOM TOOTH EXTRACTION         Family History:   Family History   Problem Relation Age of Onset    Cancer Mother         Breast    Asthma Mother     Breast cancer Mother 71    Vision loss Mother     Arthritis Mother     Cancer Father         Breast    Arthritis Father     Breast cancer Father 68    Diabetes Father     Hyperlipidemia Father     GI problems Father     Cancer Maternal Grandmother     Diabetes Maternal Grandmother     Ovarian cancer Maternal Grandmother     Uterine cancer Maternal Grandmother     Heart disease Maternal Grandfather     Diabetes Paternal Grandmother     Anxiety disorder Paternal Grandmother     Thyroid disease Paternal Grandmother     Coronary artery disease Paternal Grandfather     Stroke Paternal Grandfather     Heart disease Paternal Grandfather     Alzheimer's disease Paternal Grandfather     Anxiety disorder Paternal Aunt     Depression Paternal Aunt     Miscarriages / Stillbirths Paternal Aunt     Arthritis Maternal Aunt     Cancer Maternal Aunt     Ovarian cancer  Maternal Aunt     Miscarriages / Stillbirths Daughter     GI problems Daughter     Migraines Daughter     Colon cancer Neg Hx        Social History:   Social History     Socioeconomic History    Marital status:    Tobacco Use    Smoking status: Never    Smokeless tobacco: Never   Vaping Use    Vaping status: Never Used   Substance and Sexual Activity    Alcohol use: Never    Drug use: Never    Sexual activity: Yes     Partners: Male     Birth control/protection: None     Comment: currently discussing lab results       Medications:     Current Outpatient Medications:     atorvastatin (LIPITOR) 10 MG tablet, Take 1 tablet by mouth Daily. for cholesterol, Disp: , Rfl:     DULoxetine HCl 40 MG capsule delayed-release particles, Take 1 capsule by mouth Daily., Disp: 30 capsule, Rfl: 0    famotidine (PEPCID) 20 MG tablet, Take 1 tab po QHS for acid reflux, Disp: 90 tablet, Rfl: 1    NON FORMULARY, Tudca, Disp: , Rfl:     NON FORMULARY, [place] [facility], Disp: , Rfl:     NON FORMULARY, Selenium, Disp: , Rfl:     NON FORMULARY, molybenum, Disp: , Rfl:     omeprazole (priLOSEC) 40 MG capsule, Take 1 capsule by mouth Daily., Disp: , Rfl:     pregabalin (LYRICA) 75 MG capsule, Take 1 capsule by mouth 2 (Two) Times a Day., Disp: 60 capsule, Rfl: 2    spironolactone (ALDACTONE) 50 MG tablet, Take 1 tablet by mouth Daily., Disp: 90 tablet, Rfl: 1    SUMAtriptan (IMITREX) 25 MG tablet, Take one tablet at onset of headache. May repeat dose one time in 2 hours if headache not relieved., Disp: 12 tablet, Rfl: 5    tiZANidine (ZANAFLEX) 4 MG tablet, 1 tablet., Disp: , Rfl:     Vascepa 1 g capsule capsule, Take 2 tabs po BID For cholesterol, Disp: 360 capsule, Rfl: 1    Allergies:   Allergies   Allergen Reactions    Sulfa Antibiotics Rash       Objective     Vital Signs:   Vitals:    03/05/24 1411   BP: 133/63   Pulse: 100   Temp: 98.2 °F (36.8 °C)   TempSrc: Temporal   SpO2: 97%   Weight: 75.8 kg (167 lb)   Height: 167.6 cm  "(65.98\")   PainSc: 0-No pain    Body mass index is 26.97 kg/m².   Pain Score    03/05/24 1411   PainSc: 0-No pain       ECOG Performance Status: 0    Physical Exam:   General: No acute distress. Well appearing   HEENT: Normocephalic, atraumatic. Sclera anicteric.   Neck: supple, no adenopathy.   Cardiovascular: regular rate and rhythm. No murmurs.   Respiratory: Normal rate. Clear to auscultation bilaterally  Abdomen: Soft, nontender, non distended with normoactive bowel sounds  Lymph: no cervical, supraclavicular or axillary adenopathy  Neuro: Alert and oriented x 3. No focal deficits.   Ext: Symmetric, no swelling.   Breast: lumpectomy incisions well healing.  No palpable masses.    Laboratory/Imaging Reviewed:   Lab on 03/05/2024   Component Date Value Ref Range Status    Glucose 03/05/2024 83  65 - 99 mg/dL Final    BUN 03/05/2024 12  6 - 20 mg/dL Final    Creatinine 03/05/2024 0.73  0.57 - 1.00 mg/dL Final    Sodium 03/05/2024 139  136 - 145 mmol/L Final    Potassium 03/05/2024 3.9  3.5 - 5.2 mmol/L Final    Slight hemolysis detected by analyzer. Result may be falsely elevated.    Chloride 03/05/2024 103  98 - 107 mmol/L Final    CO2 03/05/2024 28.0  22.0 - 29.0 mmol/L Final    Calcium 03/05/2024 9.0  8.6 - 10.5 mg/dL Final    Total Protein 03/05/2024 7.5  6.0 - 8.5 g/dL Final    Albumin 03/05/2024 4.7  3.5 - 5.2 g/dL Final    ALT (SGPT) 03/05/2024 31  1 - 33 U/L Final    AST (SGOT) 03/05/2024 27  1 - 32 U/L Final    Alkaline Phosphatase 03/05/2024 121 (H)  39 - 117 U/L Final    Total Bilirubin 03/05/2024 0.3  0.0 - 1.2 mg/dL Final    Globulin 03/05/2024 2.8  gm/dL Final    Calculated Result    A/G Ratio 03/05/2024 1.7  g/dL Final    BUN/Creatinine Ratio 03/05/2024 16.4  7.0 - 25.0 Final    Anion Gap 03/05/2024 8.0  5.0 - 15.0 mmol/L Final    eGFR 03/05/2024 98.5  >60.0 mL/min/1.73 Final    WBC 03/05/2024 8.31  3.40 - 10.80 10*3/mm3 Final    RBC 03/05/2024 4.90  3.77 - 5.28 10*6/mm3 Final    Hemoglobin " "03/05/2024 14.9  12.0 - 15.9 g/dL Final    Hematocrit 03/05/2024 44.1  34.0 - 46.6 % Final    MCV 03/05/2024 90.0  79.0 - 97.0 fL Final    MCH 03/05/2024 30.4  26.6 - 33.0 pg Final    MCHC 03/05/2024 33.8  31.5 - 35.7 g/dL Final    RDW 03/05/2024 13.0  12.3 - 15.4 % Final    RDW-SD 03/05/2024 44.2  37.0 - 54.0 fl Final    MPV 03/05/2024 9.9  6.0 - 12.0 fL Final    Platelets 03/05/2024 288  140 - 450 10*3/mm3 Final    Neutrophil % 03/05/2024 61.2  42.7 - 76.0 % Final    Lymphocyte % 03/05/2024 23.9  19.6 - 45.3 % Final    Monocyte % 03/05/2024 10.2  5.0 - 12.0 % Final    Eosinophil % 03/05/2024 4.1  0.3 - 6.2 % Final    Basophil % 03/05/2024 0.5  0.0 - 1.5 % Final    Immature Grans % 03/05/2024 0.1  0.0 - 0.5 % Final    Neutrophils, Absolute 03/05/2024 5.08  1.70 - 7.00 10*3/mm3 Final    Lymphocytes, Absolute 03/05/2024 1.99  0.70 - 3.10 10*3/mm3 Final    Monocytes, Absolute 03/05/2024 0.85  0.10 - 0.90 10*3/mm3 Final    Eosinophils, Absolute 03/05/2024 0.34  0.00 - 0.40 10*3/mm3 Final    Basophils, Absolute 03/05/2024 0.04  0.00 - 0.20 10*3/mm3 Final    Immature Grans, Absolute 03/05/2024 0.01  0.00 - 0.05 10*3/mm3 Final     Reviewed her pharmacodynamic testing as outlined above.  CMP    From January 2024 with an alkaline phosphatase in the 130s and mild transaminitis.  No results found.    Procedures    Assessment / Plan      Assessment/Plan:      Left breast DCIS, ER positive   Tamoxifen monitoring  -We reviewed that DCIS represents a non-invasive or \"precancerous\" condition. -She has had surgery to remove the existing DCIS.  Radiation will further reduce her risk of ipsilateral recurrence.  We reviewed the role of endocrine therapy to reduce the risk of future malignancy in the ipsilateral and contralateral breast.  There is no evidence that this increases her survival, but would decrease the odds of subsequent malignancy.  We discussed that the alternative would be to forgo endocrine therapy and proceed with " close observation with exams and mammography.   -Her menses have resumed.  She is interested in endocrine prophylaxis to lower her risk for future breast cancer.  We reviewed side effects and schedule of tamoxifen. We specifically reviewed the risk for serious or fatal venous thromboembolism, hot flashes, muscle symptoms, hepatotoxicity, vaginal dryness and cataracts. We discussed the risk for endometrial hyperplasia/malignancies.  She should have annual gynecology exams and seek attention for any abnormal vaginal bleeding. She should avoid pregnancy due to teratogenicity.   -Informed consent was obtained and the patient elected to proceed.  -Avoid CYP-2D6 inhibitors due to drug interactions where possible.  I did review her pharmacodynamic testing that she had previously obtained independently.  -Check baseline CMP and in 8 weeks.  -Encouraged annual GYN exam, UTD.  -DVT precautions reviewed. The medication should be held for elective surgeries.  She was encouraged to remain well hydrated and ambulate frequently in high risk travel situations. Wallet card provided    2. Strong family history of breast cancer including male breast cancer and ovarian cancer  -We reviewed her genetic counseling results.  As she is heterozygous BLM carrier, no additional screening was recommended per genetics.      3.  Mild LFT elevation  -Repeated today and nearly normalized.  Follow-up in 8 weeks after tamoxifen initiation.    Follow Up:   Patient Instructions   Tamoxifen is rarely associated with overgrowth of the uterine lining. You should be evaluated by your gynecologist if you develop new or unusual vaginal bleeding/spotting.     Blood clots are uncommon, but could develop on tamoxifen. You should seek medical attention for symptoms such as redness, pain, or swelling in a leg; or sudden onset shortness of breath, chest pain or loss of consciousness while on tamoxifen as these can be symptoms of a blood clot.     The medication  should be held for elective surgical procedures. If you require hospitalization or immobilization after an injury (cast/boot/non weight bearing)  you should notify our office and your treating doctor as sometimes the tamoxifen needs to be held or additional measures taken for blood clot prevention.    You should not get pregnant while on this medication. Talk to your gynecologist about nonhormonal pregnancy prevention options        Selena Cervantes MD  Hematology and Oncology

## 2024-03-05 NOTE — PATIENT INSTRUCTIONS
Tamoxifen is rarely associated with overgrowth of the uterine lining. You should be evaluated by your gynecologist if you develop new or unusual vaginal bleeding/spotting.     Blood clots are uncommon, but could develop on tamoxifen. You should seek medical attention for symptoms such as redness, pain, or swelling in a leg; or sudden onset shortness of breath, chest pain or loss of consciousness while on tamoxifen as these can be symptoms of a blood clot.     The medication should be held for elective surgical procedures. If you require hospitalization or immobilization after an injury (cast/boot/non weight bearing)  you should notify our office and your treating doctor as sometimes the tamoxifen needs to be held or additional measures taken for blood clot prevention.    You should not get pregnant while on this medication. Talk to your gynecologist about nonhormonal pregnancy prevention options

## 2024-03-07 ENCOUNTER — PATIENT MESSAGE (OUTPATIENT)
Dept: PAIN MEDICINE | Facility: CLINIC | Age: 54
End: 2024-03-07
Payer: COMMERCIAL

## 2024-03-07 DIAGNOSIS — M54.12 CHRONIC CERVICAL RADICULOPATHY: ICD-10-CM

## 2024-03-13 DIAGNOSIS — Z17.0 MALIGNANT NEOPLASM OF RIGHT BREAST IN FEMALE, ESTROGEN RECEPTOR POSITIVE, UNSPECIFIED SITE OF BREAST: Primary | ICD-10-CM

## 2024-03-13 DIAGNOSIS — C50.911 MALIGNANT NEOPLASM OF RIGHT BREAST IN FEMALE, ESTROGEN RECEPTOR POSITIVE, UNSPECIFIED SITE OF BREAST: Primary | ICD-10-CM

## 2024-03-14 ENCOUNTER — OFFICE VISIT (OUTPATIENT)
Dept: FAMILY MEDICINE CLINIC | Facility: CLINIC | Age: 54
End: 2024-03-14
Payer: COMMERCIAL

## 2024-03-14 VITALS
HEART RATE: 99 BPM | BODY MASS INDEX: 27.13 KG/M2 | OXYGEN SATURATION: 97 % | SYSTOLIC BLOOD PRESSURE: 110 MMHG | DIASTOLIC BLOOD PRESSURE: 80 MMHG | WEIGHT: 168 LBS

## 2024-03-14 DIAGNOSIS — U07.1 COVID-19 VIRUS INFECTION: Primary | ICD-10-CM

## 2024-03-14 DIAGNOSIS — R05.1 ACUTE COUGH: ICD-10-CM

## 2024-03-14 LAB
EXPIRATION DATE: ABNORMAL
EXPIRATION DATE: NORMAL
FLUAV AG UPPER RESP QL IA.RAPID: NOT DETECTED
FLUBV AG UPPER RESP QL IA.RAPID: NOT DETECTED
INTERNAL CONTROL: ABNORMAL
INTERNAL CONTROL: NORMAL
Lab: ABNORMAL
Lab: NORMAL
S PYO AG THROAT QL: NEGATIVE
SARS-COV-2 AG UPPER RESP QL IA.RAPID: DETECTED

## 2024-03-14 PROCEDURE — 87880 STREP A ASSAY W/OPTIC: CPT | Performed by: PHYSICIAN ASSISTANT

## 2024-03-14 PROCEDURE — 99213 OFFICE O/P EST LOW 20 MIN: CPT | Performed by: PHYSICIAN ASSISTANT

## 2024-03-14 RX ORDER — DULOXETINE 40 MG/1
40 CAPSULE, DELAYED RELEASE ORAL DAILY
Qty: 30 CAPSULE | Refills: 2 | Status: SHIPPED | OUTPATIENT
Start: 2024-03-14

## 2024-03-14 NOTE — PROGRESS NOTES
"Chief Complaint  Cough, Sore Throat, and Headache    Subjective        Francesca Manning presents to [facility]  History of Present Illness  Patient reports today secondary to having a sore throat headache and cough for the past 2 days.  Patient denies any fever or chills.  Patient reports having drainage going down the back of her throat that is causing a tickle.  Patient states that also causes her cough.  Patient denies any shortness of breath or difficulty breathing.  Denies any nausea, vomiting or diarrhea.  Denies any known sick contacts  Cough  Associated symptoms include headaches and a sore throat.   Sore Throat   Associated symptoms include coughing and headaches.   Headache      Objective   Vital Signs:  /80   Pulse 99   Wt 76.2 kg (168 lb)   SpO2 97%   BMI 27.13 kg/m²   Estimated body mass index is 27.13 kg/m² as calculated from the following:    Height as of 3/5/24: 167.6 cm (65.98\").    Weight as of this encounter: 76.2 kg (168 lb).               Physical Exam  Vitals and nursing note reviewed.   Constitutional:       General: She is not in acute distress.     Appearance: Normal appearance.   HENT:      Head: Normocephalic.      Right Ear: Hearing normal.      Left Ear: Hearing normal.   Eyes:      Pupils: Pupils are equal, round, and reactive to light.   Cardiovascular:      Rate and Rhythm: Normal rate and regular rhythm.   Pulmonary:      Effort: Pulmonary effort is normal. No respiratory distress.   Skin:     General: Skin is warm and dry.   Neurological:      Mental Status: She is alert.   Psychiatric:         Mood and Affect: Mood normal.        Result Review :                     Assessment and Plan     Diagnoses and all orders for this visit:    1. COVID-19 virus infection (Primary)  Assessment & Plan:  Patient positive for COVID this date.  Advised her to increase fluids, take over-the-counter antihistamine and Delsym for relief of symptoms.  " Over-the-counter medication for fever and bodyaches.  Discussed quarantine constraints.  Discussed signs of worsening symptoms and advise ER should they occur.  Patient knowledge understanding.      2. Acute cough  Assessment & Plan:  See plan above    Orders:  -     POCT SARS-CoV-2 Antigen JEFFREY + Flu  -     POC Rapid Strep A             Follow Up     Return if symptoms worsen or fail to improve.  Patient was given instructions and counseling regarding her condition or for health maintenance advice. Please see specific information pulled into the AVS if appropriate.

## 2024-03-14 NOTE — ASSESSMENT & PLAN NOTE
Patient positive for COVID this date.  Advised her to increase fluids, take over-the-counter antihistamine and Delsym for relief of symptoms.  Over-the-counter medication for fever and bodyaches.  Discussed quarantine constraints.  Discussed signs of worsening symptoms and advise ER should they occur.  Patient knowledge understanding.

## 2024-03-18 ENCOUNTER — PATIENT MESSAGE (OUTPATIENT)
Dept: ONCOLOGY | Facility: CLINIC | Age: 54
End: 2024-03-18
Payer: COMMERCIAL

## 2024-03-26 NOTE — TELEPHONE ENCOUNTER
Spoke with patient via telephone and clarified that after further assessment, Dr. Cervantes did want to recheck her CMP before starting Tamoxifen.  [facility] [place] [facility]  Advised patient to call for results before beginning Tamoxifen.  Patient verbalized understanding.

## 2024-03-28 LAB
ALBUMIN SERPL-MCNC: 4.3 G/DL (ref 3.8–4.9)
ALBUMIN/GLOB SERPL: 1.7 {RATIO} (ref 1.2–2.2)
ALP SERPL-CCNC: 125 IU/L (ref 44–121)
ALT SERPL-CCNC: 18 IU/L (ref 0–32)
AMBIG ABBREV CMP14 DEFAULT: NORMAL
AST SERPL-CCNC: 10 IU/L (ref 0–40)
BILIRUB SERPL-MCNC: 0.5 MG/DL (ref 0–1.2)
BUN SERPL-MCNC: 11 MG/DL (ref 6–24)
BUN/CREAT SERPL: 14 (ref 9–23)
CALCIUM SERPL-MCNC: 9 MG/DL (ref 8.7–10.2)
CHLORIDE SERPL-SCNC: 101 MMOL/L (ref 96–106)
CO2 SERPL-SCNC: 22 MMOL/L (ref 20–29)
CREAT SERPL-MCNC: 0.8 MG/DL (ref 0.57–1)
EGFRCR SERPLBLD CKD-EPI 2021: 88 ML/MIN/1.73
GLOBULIN SER CALC-MCNC: 2.5 G/DL (ref 1.5–4.5)
GLUCOSE SERPL-MCNC: 125 MG/DL (ref 70–99)
POTASSIUM SERPL-SCNC: 4.3 MMOL/L (ref 3.5–5.2)
PROT SERPL-MCNC: 6.8 G/DL (ref 6–8.5)
SODIUM SERPL-SCNC: 137 MMOL/L (ref 134–144)

## 2024-03-29 ENCOUNTER — TELEPHONE (OUTPATIENT)
Dept: ONCOLOGY | Facility: CLINIC | Age: 54
End: 2024-03-29
Payer: COMMERCIAL

## 2024-03-29 NOTE — TELEPHONE ENCOUNTER
----- Message from Selena Cervantes MD sent at 3/28/2024  5:29 PM EDT -----  Please notify patient of stable results

## 2024-04-01 ENCOUNTER — HOSPITAL ENCOUNTER (OUTPATIENT)
Dept: RADIATION ONCOLOGY | Facility: HOSPITAL | Age: 54
Setting detail: RADIATION/ONCOLOGY SERIES
End: 2024-04-01
Payer: COMMERCIAL

## 2024-04-01 ENCOUNTER — OFFICE VISIT (OUTPATIENT)
Dept: RADIATION ONCOLOGY | Facility: HOSPITAL | Age: 54
End: 2024-04-01
Payer: COMMERCIAL

## 2024-04-01 ENCOUNTER — TELEPHONE (OUTPATIENT)
Dept: RADIATION ONCOLOGY | Facility: HOSPITAL | Age: 54
End: 2024-04-01
Payer: COMMERCIAL

## 2024-04-01 VITALS
DIASTOLIC BLOOD PRESSURE: 61 MMHG | RESPIRATION RATE: 16 BRPM | SYSTOLIC BLOOD PRESSURE: 130 MMHG | WEIGHT: 168.8 LBS | TEMPERATURE: 98.1 F | HEIGHT: 66 IN | BODY MASS INDEX: 27.13 KG/M2 | OXYGEN SATURATION: 95 % | HEART RATE: 94 BPM

## 2024-04-01 DIAGNOSIS — D05.12 DUCTAL CARCINOMA IN SITU (DCIS) OF LEFT BREAST: Primary | ICD-10-CM

## 2024-04-01 PROCEDURE — G0463 HOSPITAL OUTPT CLINIC VISIT: HCPCS

## 2024-04-01 NOTE — TELEPHONE ENCOUNTER
Called in Nystatin Powder to patient's preferred pharmacy.  Contact information and directions for use were provided.  Directions for use were discussed with patient during F/U appointment.

## 2024-04-01 NOTE — PROGRESS NOTES
FOLLOW UP NOTE    PATIENT:                                                      Francesca Manning  MEDICAL RECORD #:                        [id number]  :                                                          1970  COMPLETION DATE:   2024  DIAGNOSIS:     No matching staging information was found for the patient.        BRIEF HISTORY:    Francesca Manning is a 53-year-old female with a  strong family history of breast cancer with both her mother and father expiring from breast cancer.  The patient also had an uncle with prostate cancer.  The patient is a status post genetic testing in  for BRCA1 and BRCA2 that was negative at that time.  The patient did undergo genetic testing again which did not reveal any targetable mutations but she did have a variant of unknown significance.  She had a mammogram in 2023 that was questionable and they recommended an MRI scan.  She had the MRI 2023 that showed findings concerning for a BI-RADS 4 lesion and recommended surgical biopsy.  The patient had a excisional biopsy with Dr. 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  The patient was taken back for reexcision 2023 where and negative margins were identified.  No invasive disease was identified and margins were negative on final.  She completed stereotactic radiotherapy, 30 Gray in 5 fractions on 2024. She tolerated treatment well.   She reports her breast has healed from radiation.  She reports hyperpigmentation of the areola and breast skin.  She reports some tenderness over the left breast surgical scar.  She also says that her left nipple inverted after surgery.  She follows with medical oncologist, Dr. Selena Cervantes who has recommended endocrine therapy with tamoxifen.  The patient reports she will initiate tamoxifen this Thursday.      MEDICATIONS: Medication reconciliation for the  "patient was reviewed and confirmed in the electronic medical record.    Review of Systems:   Review of Systems   Constitutional: Negative.    Eyes: Negative.    Respiratory: Negative.     Cardiovascular: Negative.    Gastrointestinal: Negative.    Endocrine: Positive for hot flashes.        Perimenopausal   Genitourinary: Negative.     Musculoskeletal:  Positive for neck pain.        Baseline   Neurological:  Positive for headaches.        Baseline, takes Imitrex as needed   Psychiatric/Behavioral:  Positive for sleep disturbance.         Baseline       Physical Exam:   Physical Exam  Constitutional:       Appearance: Normal appearance.   HENT:      Head: Normocephalic.      Mouth/Throat:      Mouth: Mucous membranes are moist.   Cardiovascular:      Rate and Rhythm: Normal rate.   Pulmonary:      Effort: Pulmonary effort is normal.   Chest:   Breasts:     Right: Normal.      Left: Inverted nipple, skin change and tenderness present. No mass or nipple discharge.      Comments: Inverted left nipple, skin is raw and has white film lining the nipple.  Also has dead skin lining outside edges of inverted nipple.  Patient reports nipple inverted after surgery.  Areola and left breast skin is mildly hyperpigmented.  Lymphadenopathy:      Upper Body:      Right upper body: No supraclavicular or axillary adenopathy.      Left upper body: No supraclavicular or axillary adenopathy.   Skin:     General: Skin is warm and dry.   Neurological:      General: No focal deficit present.      Mental Status: She is alert.   Psychiatric:         Mood and Affect: Mood normal.         Behavior: Behavior normal.         VITAL SIGNS:   Vitals:    04/01/24 1441   BP: 130/61   Pulse: 94   Resp: 16   Temp: 98.1 °F (36.7 °C)   TempSrc: Skin   SpO2: 95%   Weight: 76.6 kg (168 lb 12.8 oz)   Height: 167.6 cm (66\")   PainSc:   3   PainLoc: Arm                 KPS %:  90    The following portions of the patient's history were reviewed and updated as " appropriate: allergies, current medications, past family history, past medical history, past social history, past surgical history and problem list.            IMPRESSION:  Francesca Manning is a 53-year-old female with ER/NJ positive DCIS.  The patient's lesion was very small around 1 cm total.  The patient did have intermediate to high-grade and focal necrosis on her pathology.  Her final pathology was negative but she did have an initial positive margin after her first JOSY.  The patient recovered well from surgery.  She completed stereotactic radiotherapy, 30 Gray in 5 fractions on 2/29/2024. Tolerated treatment well.  Patient developed expected grade 1 erythema and mild tenderness.  Patient denies moist desquamation.  Her skin has since healed.  Her left breast and areola are mildly hyperpigmented.  Only tenderness over the well-healed surgical scar.    On physical exam today was discovered that it appears she has a mild Candida infection in her left inverted nipple.  Sent in order for topical nystatin powder to her preferred pharmacy.  Instructed her to keep her left nipple clean, dry and apply nystatin powder 2-4 times a day.  She has a follow-up left breast diagnostic mammo scheduled on 5/20/2024 and follow-up appointment with surgeon Dr. Zaidi on 5/27/2024.  Follows with Dr. Cervantes, medical oncology.  Patient will initiate endocrine therapy, tamoxifen this Thursday.  She has follow-up with Dr. Cervantes scheduled on 5/7/2024      RECOMMENDATIONS: Continue breast imaging and follow-up with Dr. Zaidi as outlined above. Left nipple care as outlined above. Follow-up office visit in 6 months.    Left breast DCIS  -Intermediate to high-grade  -Focal comedonecrosis  -Initial Josy with positive margin inferiorly  -Ultimately achieved negative margins on reexcision 11/7/2023 with Dr. Zaidi  -ER/NJ positive              -NJ low positive  -Genetics in 2015 show no evidence of BRCA mutation  -Updated genetic shows no  actionable targets  - She completed stereotactic radiotherapy, 30 Gray in 5 fractions on 2/29/2024.   - Initiating Tamoxifen with Dr. Cervantes.    I spent a total of 35 minutes on todays visit, with more than 39 minutes in direct face to face communication, and the remainder of the time spent in reviewing the relevant history, records, available imaging, and for documentation.             TERE Frye    Errors in dictation may reflect use of voice recognition software and not all errors in transcription may have been detected prior to signing.

## 2024-05-07 ENCOUNTER — OFFICE VISIT (OUTPATIENT)
Dept: ONCOLOGY | Facility: CLINIC | Age: 54
End: 2024-05-07
Payer: COMMERCIAL

## 2024-05-07 VITALS
OXYGEN SATURATION: 98 % | SYSTOLIC BLOOD PRESSURE: 123 MMHG | TEMPERATURE: 97.4 F | WEIGHT: 165 LBS | RESPIRATION RATE: 16 BRPM | HEART RATE: 94 BPM | BODY MASS INDEX: 26.52 KG/M2 | HEIGHT: 66 IN | DIASTOLIC BLOOD PRESSURE: 74 MMHG

## 2024-05-07 DIAGNOSIS — Z79.810 ENCOUNTER FOR MONITORING TAMOXIFEN THERAPY: ICD-10-CM

## 2024-05-07 DIAGNOSIS — Z17.0 MALIGNANT NEOPLASM OF RIGHT BREAST IN FEMALE, ESTROGEN RECEPTOR POSITIVE, UNSPECIFIED SITE OF BREAST: Primary | ICD-10-CM

## 2024-05-07 DIAGNOSIS — Z51.81 ENCOUNTER FOR MONITORING TAMOXIFEN THERAPY: ICD-10-CM

## 2024-05-07 DIAGNOSIS — C50.911 MALIGNANT NEOPLASM OF RIGHT BREAST IN FEMALE, ESTROGEN RECEPTOR POSITIVE, UNSPECIFIED SITE OF BREAST: Primary | ICD-10-CM

## 2024-05-20 ENCOUNTER — HOSPITAL ENCOUNTER (OUTPATIENT)
Dept: MAMMOGRAPHY | Facility: HOSPITAL | Age: 54
Discharge: HOME | End: 2024-05-20
Admitting: SURGERY
Payer: COMMERCIAL

## 2024-05-20 DIAGNOSIS — D05.12 INTRADUCTAL CARCINOMA IN SITU OF LEFT BREAST: ICD-10-CM

## 2024-05-20 PROCEDURE — G0279 TOMOSYNTHESIS, MAMMO: HCPCS

## 2024-05-20 PROCEDURE — 77062 BREAST TOMOSYNTHESIS BI: CPT | Performed by: RADIOLOGY

## 2024-05-20 PROCEDURE — 77066 DX MAMMO INCL CAD BI: CPT | Performed by: RADIOLOGY

## 2024-05-20 PROCEDURE — 77066 DX MAMMO INCL CAD BI: CPT

## 2024-05-22 DIAGNOSIS — Z91.89 INCREASED RISK OF BREAST CANCER: Primary | ICD-10-CM

## 2024-05-24 ENCOUNTER — PATIENT MESSAGE (OUTPATIENT)
Dept: ONCOLOGY | Facility: CLINIC | Age: 54
End: 2024-05-24
Payer: COMMERCIAL

## 2024-05-25 LAB
ALBUMIN SERPL-MCNC: 4.4 G/DL (ref 3.8–4.9)
ALBUMIN/GLOB SERPL: 1.8 {RATIO} (ref 1.2–2.2)
ALP SERPL-CCNC: 118 IU/L (ref 44–121)
ALT SERPL-CCNC: 24 IU/L (ref 0–32)
AST SERPL-CCNC: 12 IU/L (ref 0–40)
BILIRUB SERPL-MCNC: 0.6 MG/DL (ref 0–1.2)
BUN SERPL-MCNC: 17 MG/DL (ref 6–24)
BUN/CREAT SERPL: 22 (ref 9–23)
CALCIUM SERPL-MCNC: 9.3 MG/DL (ref 8.7–10.2)
CHLORIDE SERPL-SCNC: 103 MMOL/L (ref 96–106)
CO2 SERPL-SCNC: 23 MMOL/L (ref 20–29)
CREAT SERPL-MCNC: 0.76 MG/DL (ref 0.57–1)
EGFRCR SERPLBLD CKD-EPI 2021: 94 ML/MIN/1.73
GLOBULIN SER CALC-MCNC: 2.5 G/DL (ref 1.5–4.5)
GLUCOSE SERPL-MCNC: 87 MG/DL (ref 70–99)
POTASSIUM SERPL-SCNC: 4.6 MMOL/L (ref 3.5–5.2)
PROT SERPL-MCNC: 6.9 G/DL (ref 6–8.5)
SODIUM SERPL-SCNC: 141 MMOL/L (ref 134–144)

## 2024-05-30 ENCOUNTER — OFFICE VISIT (OUTPATIENT)
Dept: FAMILY MEDICINE CLINIC | Facility: CLINIC | Age: 54
End: 2024-05-30
Payer: COMMERCIAL

## 2024-05-30 VITALS
BODY MASS INDEX: 26.02 KG/M2 | OXYGEN SATURATION: 97 % | SYSTOLIC BLOOD PRESSURE: 118 MMHG | WEIGHT: 161.88 LBS | HEART RATE: 89 BPM | DIASTOLIC BLOOD PRESSURE: 74 MMHG | HEIGHT: 66 IN

## 2024-05-30 DIAGNOSIS — R21 FACIAL RASH: ICD-10-CM

## 2024-05-30 DIAGNOSIS — T78.40XD ALLERGIC REACTION, SUBSEQUENT ENCOUNTER: Primary | ICD-10-CM

## 2024-05-30 PROCEDURE — 99213 OFFICE O/P EST LOW 20 MIN: CPT | Performed by: FAMILY MEDICINE

## 2024-05-30 RX ORDER — LORATADINE 10 MG/1
1 TABLET ORAL DAILY
COMMUNITY

## 2024-05-30 RX ORDER — DULOXETINE 40 MG/1
1 CAPSULE, DELAYED RELEASE ORAL DAILY
COMMUNITY
Start: 2024-05-09 | End: 2024-05-30

## 2024-05-30 RX ORDER — METHYLPREDNISOLONE 4 MG
4 TABLET, DOSE PACK ORAL DAILY
COMMUNITY
Start: 2024-05-25 | End: 2024-05-30

## 2024-05-30 RX ORDER — METHYLPREDNISOLONE 4 MG/1
TABLET ORAL
Qty: 21 TABLET | Refills: 0 | Status: SHIPPED | OUTPATIENT
Start: 2024-05-30

## 2024-05-30 NOTE — PROGRESS NOTES
Follow Up Office Visit      Patient Name: Francesca Manning  : 1970   MRN: [id number]     Chief Complaint:    Chief Complaint   Patient presents with    Rash     Patient states Friday morning she woke up with redness beside her left eye, Saturday morning she woke up to her entire face and eyes swollen. Did see [facility] on Saturday and was given steroid pack which she completed this morning. Symptoms have almost resolved however she does have some redness on her cheeks and neck.        History of Present Illness: Francesca Manning is a 53 y.o. female who is here today for follow up with urgent care visit.  Patient was placed on a steroid pack due to sudden onset of a rash last Friday morning.  Patient states her whole face was red and swollen.  Symptoms have been resolving on steroid.  Patient continues with some redness and she is finished steroid today.    Subjective      Review of Systems:   Review of Systems   Skin:  Positive for rash.       The following portions of the patient's history were reviewed and updated as appropriate: allergies, current medications, past family history, past medical history, past social history, past surgical history and problem list.    Medications:     Current Outpatient Medications:     atorvastatin (LIPITOR) 10 MG tablet, Take 1 tablet by mouth Daily. for cholesterol, Disp: , Rfl:     loratadine (Claritin) 10 MG tablet, Take 1 tablet by mouth Daily., Disp: , Rfl:     NON FORMULARY, enzycore, Disp: , Rfl:     NON FORMULARY, Parotid PMG, Disp: , Rfl:     pregabalin (LYRICA) 75 MG capsule, Take 1 capsule by mouth 2 (Two) Times a Day., Disp: 60 capsule, Rfl: 2    SUMAtriptan (IMITREX) 25 MG tablet, Take one tablet at onset of headache. May repeat dose one time in 2 hours if headache not relieved., Disp: 12 tablet, Rfl: 5    tamoxifen (NOLVADEX) 20 MG chemo tablet, Take 1 tablet by mouth Daily for 90 days., Disp: 30 tablet, Rfl: 2    tiZANidine (ZANAFLEX) 4 MG tablet, 1  "tablet., Disp: , Rfl:     Vascepa 1 g capsule capsule, Take 2 tabs po BID For cholesterol, Disp: 360 capsule, Rfl: 1    methylPREDNISolone (MEDROL) 4 MG dose pack, Take as directed on package instructions., Disp: 21 tablet, Rfl: 0    Allergies:   Allergies   Allergen Reactions    Sulfa Antibiotics Rash       Objective     Physical Exam:  Vital Signs:   Vitals:    05/30/24 1103   BP: 118/74   BP Location: Left arm   Patient Position: Sitting   Cuff Size: Adult   Pulse: 89   SpO2: 97%   Weight: 73.4 kg (161 lb 14.1 oz)   Height: 167.6 cm (65.98\")     Body mass index is 26.14 kg/m².   Facility age limit for growth %[place] is 20 years.    Physical Exam  Vitals and nursing note reviewed.   Constitutional:       Appearance: Normal appearance.   HENT:      Head:      Comments: Minimal scattered areas of erythema noted of the face and neck.  Neurological:      Mental Status: She is alert.         Procedures    PHQ-9 Total Score:       Assessment / Plan      Assessment/Plan:   Diagnoses and all orders for this visit:    1. Allergic reaction, subsequent encounter (Primary)    2. Facial rash    Other orders  -     methylPREDNISolone (MEDROL) 4 MG dose pack; Take as directed on package instructions.  Dispense: 21 tablet; Refill: 0       Rash looks allergic in nature.  Seems to be resolving.  Etiology not clear at this time but suspect possible wax patient used on the face several days back.  I recommend continue Zyrtec and consider adding either Claritin or Allegra.  Patient is going out of town this weekend.  Will prescribe another Medrol pack.  I recommend patient hold for now.  If symptoms return, start the Medrol pack and we will need to consider further workup of the rash.           Follow Up:   No follow-ups on file.      GRETTA Ugarte MD  [facility]  "

## 2024-05-31 ENCOUNTER — TELEPHONE (OUTPATIENT)
Dept: ONCOLOGY | Facility: CLINIC | Age: 54
End: 2024-05-31
Payer: COMMERCIAL

## 2024-05-31 NOTE — TELEPHONE ENCOUNTER
Called patient after lab results received from [facility] and Dr. Cervantes reviewed.  Called patient to advise her that only a CMP was collected and not a CBC.  Advisable per Dr. Cervantes that she should have the CBC drawn.  No answer received.  Left  requesting return call.

## 2024-05-31 NOTE — TELEPHONE ENCOUNTER
Patient returned call.  Called patient back with no answer received.  Left VM requesting return call.

## 2024-05-31 NOTE — TELEPHONE ENCOUNTER
Notified patient per Dr. Cervantes (via telephone) only a CMP was collected and not a CBC.  Advisable per Dr. Cervantes that she should have the CBC drawn. Patient verbalized understanding and stated she will go to a [facility] lab to complete.

## 2024-05-31 NOTE — TELEPHONE ENCOUNTER
Caller: Francesca Manning    Relationship: Self    Best call back number: [phone]      What was the call regarding: PT MISSED CALL FROM THE OFFICE

## 2024-06-03 ENCOUNTER — LAB (OUTPATIENT)
Dept: FAMILY MEDICINE CLINIC | Facility: CLINIC | Age: 54
End: 2024-06-03
Payer: COMMERCIAL

## 2024-06-03 DIAGNOSIS — Z17.0 MALIGNANT NEOPLASM OF RIGHT BREAST IN FEMALE, ESTROGEN RECEPTOR POSITIVE, UNSPECIFIED SITE OF BREAST: ICD-10-CM

## 2024-06-03 DIAGNOSIS — C50.911 MALIGNANT NEOPLASM OF RIGHT BREAST IN FEMALE, ESTROGEN RECEPTOR POSITIVE, UNSPECIFIED SITE OF BREAST: ICD-10-CM

## 2024-06-03 PROCEDURE — 36415 COLL VENOUS BLD VENIPUNCTURE: CPT | Performed by: INTERNAL MEDICINE

## 2024-06-04 LAB
ALBUMIN SERPL-MCNC: 4.2 G/DL (ref 3.8–4.9)
ALBUMIN/GLOB SERPL: 1.9 {RATIO} (ref 1.2–2.2)
ALP SERPL-CCNC: 112 IU/L (ref 44–121)
ALT SERPL-CCNC: 24 IU/L (ref 0–32)
AST SERPL-CCNC: 12 IU/L (ref 0–40)
BASOPHILS # BLD AUTO: 0 X10E3/UL (ref 0–0.2)
BASOPHILS NFR BLD AUTO: 0 %
BILIRUB SERPL-MCNC: 0.6 MG/DL (ref 0–1.2)
BUN SERPL-MCNC: 10 MG/DL (ref 6–24)
BUN/CREAT SERPL: 15 (ref 9–23)
CALCIUM SERPL-MCNC: 8.9 MG/DL (ref 8.7–10.2)
CHLORIDE SERPL-SCNC: 104 MMOL/L (ref 96–106)
CO2 SERPL-SCNC: 22 MMOL/L (ref 20–29)
CREAT SERPL-MCNC: 0.65 MG/DL (ref 0.57–1)
EGFRCR SERPLBLD CKD-EPI 2021: 105 ML/MIN/1.73
EOSINOPHIL # BLD AUTO: 0.2 X10E3/UL (ref 0–0.4)
EOSINOPHIL NFR BLD AUTO: 3 %
ERYTHROCYTE [DISTWIDTH] IN BLOOD BY AUTOMATED COUNT: 12.4 % (ref 11.7–15.4)
GLOBULIN SER CALC-MCNC: 2.2 G/DL (ref 1.5–4.5)
GLUCOSE SERPL-MCNC: 80 MG/DL (ref 70–99)
HCT VFR BLD AUTO: 44.5 % (ref 34–46.6)
HGB BLD-MCNC: 14.4 G/DL (ref 11.1–15.9)
IMM GRANULOCYTES # BLD AUTO: 0 X10E3/UL (ref 0–0.1)
IMM GRANULOCYTES NFR BLD AUTO: 1 %
LYMPHOCYTES # BLD AUTO: 1.9 X10E3/UL (ref 0.7–3.1)
LYMPHOCYTES NFR BLD AUTO: 29 %
MCH RBC QN AUTO: 30.5 PG (ref 26.6–33)
MCHC RBC AUTO-ENTMCNC: 32.4 G/DL (ref 31.5–35.7)
MCV RBC AUTO: 94 FL (ref 79–97)
MONOCYTES # BLD AUTO: 0.6 X10E3/UL (ref 0.1–0.9)
MONOCYTES NFR BLD AUTO: 8 %
NEUTROPHILS # BLD AUTO: 4 X10E3/UL (ref 1.4–7)
NEUTROPHILS NFR BLD AUTO: 59 %
PLATELET # BLD AUTO: 238 X10E3/UL (ref 150–450)
POTASSIUM SERPL-SCNC: 4 MMOL/L (ref 3.5–5.2)
PROT SERPL-MCNC: 6.4 G/DL (ref 6–8.5)
RBC # BLD AUTO: 4.72 X10E6/UL (ref 3.77–5.28)
SODIUM SERPL-SCNC: 139 MMOL/L (ref 134–144)
WBC # BLD AUTO: 6.7 X10E3/UL (ref 3.4–10.8)

## 2024-06-06 DIAGNOSIS — M54.12 CHRONIC CERVICAL RADICULOPATHY: ICD-10-CM

## 2024-06-06 RX ORDER — DULOXETINE 40 MG/1
1 CAPSULE, DELAYED RELEASE ORAL DAILY
Qty: 30 CAPSULE | Refills: 2 | OUTPATIENT
Start: 2024-06-06

## 2024-06-10 ENCOUNTER — OFFICE VISIT (OUTPATIENT)
Dept: PAIN MEDICINE | Facility: CLINIC | Age: 54
End: 2024-06-10
Payer: COMMERCIAL

## 2024-06-10 VITALS — WEIGHT: 165.4 LBS | BODY MASS INDEX: 26.58 KG/M2 | HEIGHT: 66 IN

## 2024-06-10 DIAGNOSIS — M54.12 CHRONIC CERVICAL RADICULOPATHY: Primary | ICD-10-CM

## 2024-06-10 DIAGNOSIS — M54.12 CERVICAL RADICULOPATHY: ICD-10-CM

## 2024-06-10 PROCEDURE — 99213 OFFICE O/P EST LOW 20 MIN: CPT

## 2024-06-10 NOTE — PROGRESS NOTES
Referring Physician: No referring provider defined for this encounter.    Primary Physician: Dominique Blunt PA-C    CHIEF COMPLAINT or REASON FOR VISIT: Follow-up and Arm Pain (Right)      Initial history of present illness on 01/08/2024:  Ms. Francesca Manning is 53 y.o. female who presents as a new patient referral for evaluation treatment chronic no other radiation of the right upper extremity.  Patient has past medical history of a C5-6 ACDF with Dr. Enriquez.  Prior to that surgery she is primary complaining of right neck and shoulder pain; subsequent to the surgery patient continues to complain of right upper extremity pain however this is now underneath the armpit and into this small and ring fingers.  She additionally reports a recent onset of right rib pain associated with severe coughing secondary to RSV.  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    She primarily complains of numbness tingling and pain underneath her right armpit and into the small and ring fingers.  She also reports reduced  strength in her right hand (right-hand-dominant).  She has had an EMG/NCV with Dr. Andrews which did not demonstrate any focal nerve entrapment or radiculopathy.  She was evaluated further by Dr. Enriquez who did not identify any role for surgical intervention and referred to pain management.  Patient denies any bowel or bladder dysfunction, lower extremity weakness, new onset saddle anesthesia or unexplained weight loss.   She has tried Cymbalta which was helpful; PCP discontinued Cymbalta in order to start pregabalin which has not been helpful.  She has noticed an increase in her pain since stopping Cymbalta.      Interval history: Patient returns to clinic today.  Historically her symptoms have been pretty well-controlled with pregabalin and duloxetine.  Today, she primarily complains of chronic right-sided neck pain with  radiation to the right upper extremity.  Pain will radiate into the fourth and fifth digit of her right hand.  She has noticed significant improvement since starting pregabalin.  She is not longer taking duloxetine as she is trying to prevent taking numerous medications daily.  She denies any new injuries or events since her previous appointment.  She is continuing to complete physical therapy weekly.  She is reportedly going to try acupuncture.  She is interested in additional treatment strategies to alleviate her pain.  Patient reports she is not interested in SCS at this time.    She has been following with oncology regarding a potential malignant breast neoplasm.  Today she reports that these issues have mostly been resolved.  She is taking tamoxifen  Interventions:      Objective Pain Scoring:   BRIEF PAIN INVENTORY:  Total score:   Pain Score    06/10/24 0757   PainSc:   3   PainLoc: Arm        PHQ-2: PHQ-2 Total Score: 0  PHQ-9: PHQ-9: Brief Depression Severity Measure Score: 0  Opioid Risk Tool:         Review of Systems:   ROS negative except as otherwise noted     Past Medical History:   Past Medical History:   Diagnosis Date    Anxiety     Arthritis     Arthritis of neck currently    BRCA1 negative     BRCA2 negative     Breast cancer     Cancer     Cervical arthritis 07/11/2023    Cervical disc disorder Feb 2021    Chronic cervical radiculopathy 06/01/2023    Chronic pain disorder From surgery 12/13/22    Diabetes mellitus Gestational    Frozen shoulder approx year ago    GERD (gastroesophageal reflux disease)     Gestational diabetes     Headache, tension-type     Hypercholesteremia     Low back pain neck    12/2021    Migraines     Mild depression 10/17/2022    Multiple gestation     Neck pain     PONV (postoperative nausea and vomiting)     Urinary tract infection     Varicella          Past Surgical History:   Past Surgical History:   Procedure Laterality Date    ANTERIOR CERVICAL DISCECTOMY W/  FUSION N/A 2022    Procedure: CERVICAL DISCECTOMY ANTERIOR WITH FUSION C5-6;  Surgeon: Giovany Enriquez MD;  Location: [facility];  Service: Neurosurgery;  Laterality: N/A;    BREAST SURGERY       SECTION  2010    Boy: Hitesh     SECTION PRIMARY  2002    Boy: [place]    CYSTOSCOPY      X 2    EYE SURGERY      RETINAL DETACHMENT X3    HYSTEROSCOPY W/ POLYPECTOMY  2022    with myosure lite, D&C    NECK SURGERY  Dec 13, 2022    SPINAL FUSION  Dec 13, 2022    WISDOM TOOTH EXTRACTION           Family History   Family History   Problem Relation Age of Onset    Cancer Mother         Breast    Asthma Mother     Breast cancer Mother 71    Vision loss Mother     Arthritis Mother     Cancer Father         Breast    Arthritis Father     Breast cancer Father 68    Diabetes Father     Hyperlipidemia Father     GI problems Father     Cancer Maternal Grandmother     Diabetes Maternal Grandmother     Ovarian cancer Maternal Grandmother     Uterine cancer Maternal Grandmother     Heart disease Maternal Grandfather     Diabetes Paternal Grandmother     Anxiety disorder Paternal Grandmother     Thyroid disease Paternal Grandmother     Coronary artery disease Paternal Grandfather     Stroke Paternal Grandfather     Heart disease Paternal Grandfather     Alzheimer's disease Paternal Grandfather     Anxiety disorder Paternal Aunt     Depression Paternal Aunt     Miscarriages / Stillbirths Paternal Aunt     Arthritis Maternal Aunt     Cancer Maternal Aunt     Ovarian cancer Maternal Aunt     Miscarriages / Stillbirths Daughter     GI problems Daughter     Migraines Daughter     Colon cancer Neg Hx          Social History   Social History     Socioeconomic History    Marital status:    Tobacco Use    Smoking status: Never    Smokeless tobacco: Never   Vaping Use    Vaping status: Never Used   Substance and Sexual Activity    Alcohol use: Never    Drug use: Never    Sexual activity: Yes  "    Partners: Male     Birth control/protection: None     Comment: currently discussing lab results        Medications:     Current Outpatient Medications:     atorvastatin (LIPITOR) 10 MG tablet, Take 1 tablet by mouth Daily. for cholesterol, Disp: , Rfl:     loratadine (Claritin) 10 MG tablet, Take 1 tablet by mouth Daily., Disp: , Rfl:     NON FORMULARY, enzycore, Disp: , Rfl:     NON FORMULARY, Parotid PMG, Disp: , Rfl:     pregabalin (LYRICA) 75 MG capsule, Take 1 capsule by mouth 2 (Two) Times a Day. (Patient taking differently: Take 50 mg by mouth 2 (Two) Times a Day. 50 MG BID), Disp: 60 capsule, Rfl: 2    SUMAtriptan (IMITREX) 25 MG tablet, Take one tablet at onset of headache. May repeat dose one time in 2 hours if headache not relieved., Disp: 12 tablet, Rfl: 5    tiZANidine (ZANAFLEX) 4 MG tablet, 1 tablet., Disp: , Rfl:     Vascepa 1 g capsule capsule, Take 2 tabs po BID For cholesterol, Disp: 360 capsule, Rfl: 1        Physical Exam:     Vitals:    06/10/24 0757   Weight: 75 kg (165 lb 6.4 oz)   Height: 167.6 cm (65.98\")   PainSc:   3   PainLoc: Arm          General: Alert and oriented, No acute distress.   HEENT: Normocephalic, atraumatic.   Cardiovascular: No gross edema  Respiratory: Respirations are non-labored    Cervical Spine:   No masses or atrophy  Range of motion - Flexion normal. Extension normal. Lateral rotation normal.   Palpation - nontender   Spurling's - negative       Motor Exam:    Strength: Rate on 1-5 scale Right Left    C5-Elbow flexion, Deltoid 5 5    C6-Wrist extension 5 5    C7- Elbow / finger extension 5 5    C8- Finger flexion 5 5    T1- Intrinsics hand 5 5    Sensory Exam: Reduced sensation light touch with paresthesia in right C8 versus ulnar distribution    Neurologic: Cranial Nerves II-XII are grossly intact.   Webber’s -negative bilaterally   Psychiatric: Cooperative.   Gait: Normal   Assistive Devices: None    Physical exam is consistent and accurate as of " 6/10/2024    Imaging Studies:   No results found for this or any previous visit.      Impression & Plan:       01/08/2024: Francesca Manning is a 53 y.o. female with past medical history significant for family history breast cancer, GERD, anxiety, C5-6 ACDF with Dr. Enriquez who presents to the pain clinic for evaluation and treatment of chronic neck pain with radiation to right upper extremity and hand.  I personally reviewed and interpreted her cervical MRI dated April 18, 2023 demonstrating C5-6 ACDF; mild C5-6 right NFS; mild bilateral C6/7 NFS; patent canal.  Examination consistent with cervical radiculopathy versus ulnar neuropathy.  EMG/NCV was negative.  We discussed epidural steroid injection to improve pain.  If greater than 50% relief for at least 2-3 months can consider repeat as needed every 3 to 4 months.  I had a discussion with the patient regarding the risks of the procedure including bleeding, infection, damage to surrounding structures.  We discussed the potential adverse effects of corticosteroid injection including flushing of the face, lipodystrophy, skin discoloration, elevated blood glucose, increased blood pressure.  Risks of frequent steroid administration include weight gain, hormonal changes, mood changes, osteoporosis.  If minimal or only transient benefit from epidural can consider spinal cord stimulator trial.  3/4/2024: Symptoms well-controlled with Cymbalta and pregabalin.  Can consider WAYLON.  6/10/2024: Will plan for right paramedian WAYLON.  Can always consider SCS if minimal or transient benefit.    1. Chronic cervical radiculopathy    2. Cervical radiculopathy            PLAN:  1. Medication Recommendations: Recommend Voltaren topical, NSAIDs, Tylenol.  Can trial turmeric 500 mg twice daily if NSAID contraindicated.  -No longer taking Cymbalta  -Agree with pregabalin    2. Physical Therapy: Continue HEP    3. Psychological: defer.  Consider SCS clearance    4. Complementary and alternative  (CAM) Therapies:     5. Labs/Diagnostic studies: None indicated     6. Imagin. Interventions: Schedule right paramedian cervical interlaminar epidural steroid injection (07207).  Can consider SCS trial if minimal or transient benefit to WAYLON.    8. Referrals: None indicated     9. Records: PCP and oncology notes reviewed    10. Lifestyle goals:    Follow-up 4 weeks after injection    [facility]  Jose Jain PA-C

## 2024-06-26 RX ORDER — ATORVASTATIN CALCIUM 10 MG/1
10 TABLET, FILM COATED ORAL DAILY
Qty: 90 TABLET | Refills: 1 | Status: SHIPPED | OUTPATIENT
Start: 2024-06-26

## 2024-07-15 DIAGNOSIS — C50.911 MALIGNANT NEOPLASM OF RIGHT BREAST IN FEMALE, ESTROGEN RECEPTOR POSITIVE, UNSPECIFIED SITE OF BREAST: Primary | ICD-10-CM

## 2024-07-15 DIAGNOSIS — Z17.0 MALIGNANT NEOPLASM OF RIGHT BREAST IN FEMALE, ESTROGEN RECEPTOR POSITIVE, UNSPECIFIED SITE OF BREAST: Primary | ICD-10-CM

## 2024-07-15 RX ORDER — TAMOXIFEN CITRATE 20 MG/1
1 TABLET ORAL DAILY
COMMUNITY
Start: 2024-06-13 | End: 2024-07-15 | Stop reason: SDUPTHER

## 2024-07-15 RX ORDER — TAMOXIFEN CITRATE 20 MG/1
20 TABLET ORAL DAILY
Qty: 90 TABLET | Refills: 3 | Status: SHIPPED | OUTPATIENT
Start: 2024-07-15

## 2024-07-17 ENCOUNTER — OFFICE VISIT (OUTPATIENT)
Dept: FAMILY MEDICINE CLINIC | Facility: CLINIC | Age: 54
End: 2024-07-17
Payer: COMMERCIAL

## 2024-07-17 VITALS
BODY MASS INDEX: 25.71 KG/M2 | OXYGEN SATURATION: 98 % | WEIGHT: 160 LBS | HEART RATE: 70 BPM | DIASTOLIC BLOOD PRESSURE: 66 MMHG | SYSTOLIC BLOOD PRESSURE: 114 MMHG | HEIGHT: 66 IN

## 2024-07-17 DIAGNOSIS — C50.911 MALIGNANT NEOPLASM OF RIGHT BREAST IN FEMALE, ESTROGEN RECEPTOR POSITIVE, UNSPECIFIED SITE OF BREAST: ICD-10-CM

## 2024-07-17 DIAGNOSIS — N95.1 PERIMENOPAUSAL: ICD-10-CM

## 2024-07-17 DIAGNOSIS — M54.12 CHRONIC CERVICAL RADICULOPATHY: ICD-10-CM

## 2024-07-17 DIAGNOSIS — L65.9 ALOPECIA: ICD-10-CM

## 2024-07-17 DIAGNOSIS — E78.1 HYPERTRIGLYCERIDEMIA: Primary | ICD-10-CM

## 2024-07-17 DIAGNOSIS — G43.009 MIGRAINE WITHOUT AURA AND WITHOUT STATUS MIGRAINOSUS, NOT INTRACTABLE: ICD-10-CM

## 2024-07-17 DIAGNOSIS — Z17.0 MALIGNANT NEOPLASM OF RIGHT BREAST IN FEMALE, ESTROGEN RECEPTOR POSITIVE, UNSPECIFIED SITE OF BREAST: ICD-10-CM

## 2024-07-17 PROCEDURE — 99213 OFFICE O/P EST LOW 20 MIN: CPT | Performed by: PHYSICIAN ASSISTANT

## 2024-07-17 RX ORDER — TIZANIDINE 4 MG/1
4 TABLET ORAL
Qty: 90 TABLET | Refills: 1 | Status: SHIPPED | OUTPATIENT
Start: 2024-07-17

## 2024-07-17 RX ORDER — SUMATRIPTAN 25 MG/1
TABLET, FILM COATED ORAL
Qty: 12 TABLET | Refills: 5 | Status: CANCELLED | OUTPATIENT
Start: 2024-07-17

## 2024-07-17 RX ORDER — ICOSAPENT ETHYL 1000 MG/1
CAPSULE ORAL
Qty: 360 CAPSULE | Refills: 1 | Status: SHIPPED | OUTPATIENT
Start: 2024-07-17

## 2024-07-17 NOTE — ASSESSMENT & PLAN NOTE
Advised patient increase bilateral discomfort in hands could be secondary to discontinuing Cymbalta and cutting back on Lyrica.  Advised she may need to increase dose of Lyrica to 75 mg twice daily.  Patient reports she will discuss with neurosurgery.

## 2024-07-17 NOTE — PROGRESS NOTES
"Chief Complaint  Hyperlipidemia and Med Refill    Subjective        Francesca Manning presents to [facility]  History of Present Illness  Patient reports today for medication refills.  States she has elevated cholesterol and takes atorvastatin and Vascepa daily.  Patient reports she has attempted diet modifications but has not been able to be as strict recently.    Patient reports having cervical radiculopathy.  States she has decreased her medication down to 50 mg of Lyrica twice daily.  States since the medication adjustment she is now waking with pain in both hands.  Request refills of tizanidine      Hyperlipidemia        Objective   Vital Signs:  /66   Pulse 70   Ht 167.6 cm (66\")   Wt 72.6 kg (160 lb)   SpO2 98%   BMI 25.82 kg/m²   Estimated body mass index is 25.82 kg/m² as calculated from the following:    Height as of this encounter: 167.6 cm (66\").    Weight as of this encounter: 72.6 kg (160 lb).               Physical Exam  Vitals and nursing note reviewed.   Constitutional:       General: She is not in acute distress.     Appearance: Normal appearance.   HENT:      Head: Normocephalic.      Right Ear: Hearing normal.      Left Ear: Hearing normal.   Eyes:      Pupils: Pupils are equal, round, and reactive to light.   Cardiovascular:      Rate and Rhythm: Normal rate and regular rhythm.   Pulmonary:      Effort: Pulmonary effort is normal. No respiratory distress.   Skin:     General: Skin is warm and dry.   Neurological:      Mental Status: She is alert.   Psychiatric:         Mood and Affect: Mood normal.        Result Review :                     Assessment and Plan     Diagnoses and all orders for this visit:    1. Hypertriglyceridemia (Primary)  Assessment & Plan:   Patient had fasting labs done yesterday with specialist will submit results through [email or web address]  Refill patient's atorvastatin at current dose.    Orders:  -     Vascepa 1 g capsule capsule; Take 2 " tabs po BID For cholesterol  Dispense: 360 capsule; Refill: 1    2. Alopecia  Assessment & Plan:  Will check thyroid levels today.    Orders:  -     TSH; Future  -     T4, Free; Future  -     TSH  -     T4, Free    3. Chronic cervical radiculopathy  Assessment & Plan:  Advised patient increase bilateral discomfort in hands could be secondary to discontinuing Cymbalta and cutting back on Lyrica.  Advised she may need to increase dose of Lyrica to 75 mg twice daily.  Patient reports she will discuss with neurosurgery.    Orders:  -     tiZANidine (ZANAFLEX) 4 MG tablet; Take 1 tablet by mouth every night at bedtime. Caution sedation  Dispense: 90 tablet; Refill: 1    4. Migraine without aura and without status migrainosus, not intractable  Assessment & Plan:              Orders:  -     HCG, B-subunit, Quantitative; Future  -     Cancel: HCG, B-subunit, Quantitative    5. Perimenopausal  -     Follicle Stimulating Hormone  -     Cancel: hCG, Quantitative, Pregnancy  -     Estradiol    6. Malignant neoplasm of right breast in female, estrogen receptor positive, unspecified site of breast  -     Comprehensive Metabolic Panel             Follow Up     Return in about 6 months (around 1/17/2025).  Patient was given instructions and counseling regarding her condition or for health maintenance advice. Please see specific information pulled into the AVS if appropriate.

## 2024-07-17 NOTE — ASSESSMENT & PLAN NOTE
Patient had fasting labs done yesterday with specialist will submit results through [employer].  Refill patient's atorvastatin at current dose.

## 2024-07-18 LAB
ALBUMIN SERPL-MCNC: 4.7 G/DL (ref 3.8–4.9)
ALP SERPL-CCNC: 111 IU/L (ref 44–121)
ALT SERPL-CCNC: 27 IU/L (ref 0–32)
AST SERPL-CCNC: 14 IU/L (ref 0–40)
BILIRUB SERPL-MCNC: 0.4 MG/DL (ref 0–1.2)
BUN SERPL-MCNC: 12 MG/DL (ref 6–24)
BUN/CREAT SERPL: 16 (ref 9–23)
CALCIUM SERPL-MCNC: 9.5 MG/DL (ref 8.7–10.2)
CHLORIDE SERPL-SCNC: 104 MMOL/L (ref 96–106)
CO2 SERPL-SCNC: 20 MMOL/L (ref 20–29)
CREAT SERPL-MCNC: 0.76 MG/DL (ref 0.57–1)
EGFRCR SERPLBLD CKD-EPI 2021: 94 ML/MIN/1.73
ESTRADIOL SERPL-MCNC: 6.7 PG/ML
FSH SERPL-ACNC: 24.6 MIU/ML
GLOBULIN SER CALC-MCNC: 2.9 G/DL (ref 1.5–4.5)
GLUCOSE SERPL-MCNC: 106 MG/DL (ref 70–99)
POTASSIUM SERPL-SCNC: 4.3 MMOL/L (ref 3.5–5.2)
PROT SERPL-MCNC: 7.6 G/DL (ref 6–8.5)
SODIUM SERPL-SCNC: 140 MMOL/L (ref 134–144)
T4 FREE SERPL-MCNC: 1.11 NG/DL (ref 0.82–1.77)
TSH SERPL DL<=0.005 MIU/L-ACNC: 1.38 UIU/ML (ref 0.45–4.5)

## 2024-07-22 ENCOUNTER — TELEPHONE (OUTPATIENT)
Dept: OBSTETRICS AND GYNECOLOGY | Facility: CLINIC | Age: 54
End: 2024-07-22
Payer: COMMERCIAL

## 2024-07-22 ENCOUNTER — OFFICE VISIT (OUTPATIENT)
Age: 54
End: 2024-07-22
Payer: COMMERCIAL

## 2024-07-22 VITALS
HEART RATE: 76 BPM | HEIGHT: 66 IN | BODY MASS INDEX: 25.56 KG/M2 | SYSTOLIC BLOOD PRESSURE: 118 MMHG | DIASTOLIC BLOOD PRESSURE: 70 MMHG | TEMPERATURE: 97 F | WEIGHT: 159.06 LBS

## 2024-07-22 DIAGNOSIS — M50.30 DDD (DEGENERATIVE DISC DISEASE), CERVICAL: Primary | ICD-10-CM

## 2024-07-22 DIAGNOSIS — R76.8 POSITIVE ANA (ANTINUCLEAR ANTIBODY): ICD-10-CM

## 2024-07-22 PROBLEM — R89.9 ABNORMAL LABORATORY TEST: Status: ACTIVE | Noted: 2024-07-22

## 2024-07-22 PROBLEM — R53.83 OTHER FATIGUE: Status: ACTIVE | Noted: 2024-07-22

## 2024-07-22 PROBLEM — L65.9 HAIR LOSS: Status: ACTIVE | Noted: 2024-07-22

## 2024-07-22 PROCEDURE — 99214 OFFICE O/P EST MOD 30 MIN: CPT | Performed by: INTERNAL MEDICINE

## 2024-07-22 RX ORDER — TAMOXIFEN CITRATE 20 MG/1
20 TABLET ORAL DAILY
Qty: 30 TABLET | Refills: 11 | OUTPATIENT
Start: 2024-07-22

## 2024-07-22 RX ORDER — MELOXICAM 15 MG/1
15 TABLET ORAL DAILY
Qty: 30 TABLET | Refills: 5 | Status: SHIPPED | OUTPATIENT
Start: 2024-07-22

## 2024-07-22 NOTE — ASSESSMENT & PLAN NOTE
1:160 speckled.  Other serologies negative.    Plan:  NO CURRENT SIGNS/SX CTD.  SPF 50 +  If she develops sx that we discussed she can return to clinic sooner otherwise I will see her annually.

## 2024-07-22 NOTE — ASSESSMENT & PLAN NOTE
Seeing Dr. Hilton.  Developed on Topamax and felt she had some on Lyrica however when meds discontinue hair loss did not stop.  Janelle by IFA Positive 1:160 homogenous , Secondary serologies negative 7/23.  No alopecia but has thinning.    Plan:    Patient has scalp biopsy - results pending.

## 2024-07-22 NOTE — ASSESSMENT & PLAN NOTE
In past she must have had iron deficiency. She was on oral iron. Recently elevated and she was told to cut her dose in half.    Plan:    She had recheck with pcp ? results.

## 2024-07-22 NOTE — TELEPHONE ENCOUNTER
I just wanted to see if she was using condoms?  Given her recent values we cannot confidently say she is completely in menopause yet. Usually I will want to see two elevated FSH levels 25 or above over a 6 month period of time.     It looks like she is also due for a annual exam. Is she okay with scheduling that?    Thanks, Jen Narayanan MD

## 2024-07-22 NOTE — TELEPHONE ENCOUNTER
----- Message from Carmen RITTER sent at 7/22/2024  8:42 AM EDT -----  Called and spoke with patient, informed them of test results and physician advisement in full.  Patient verified understanding.  She states that she has not been taking any birth control or contraception for quite some time.

## 2024-07-22 NOTE — ASSESSMENT & PLAN NOTE
Experiencing since surgery.  She initially though anaesthesia related  H/o B12 and D deficiency. She is on supplements  TSH has not been checked in quite some time.  Denies snoring.  T4 elevated 12.3, TSH normal, B12 662, Vitamin D 43 in 7/23    PLAN:

## 2024-07-22 NOTE — PROGRESS NOTES
[facility] Follow Up Visit     Office Follow Up      Date: 07/22/2024   Patient Name: Francesca Manning  MRN: [id number]  YOB: 1970    Referring Physician: Dominique Blunt PA-C     Chief Complaint   Patient presents with    Follow-up       History of Present Illness: Francesca Manning is a 53 y.o. female who is here today for follow up on DDD.  History of Present Illness  The patient is a 53-year-old female who presents for evaluation of cervical degenerative arthritis.    She suspects that her joint pain 3/10-may be a side effect of a combination of starting tamoxifen and discontinuing Cymbalta, as she is on Lyrica. She experiences stiffness and pain in the PIP joints of her fingers on both hands and toes on her left foot, particularly in the mornings, which lasts for about 20 minutes. Despite the pain, she is able to continue working through it. She occasionally takes Tylenol Arthritis and has not taken meloxicam. She denies any history of heart attack, kidney disease, ulcers, stroke, TIA, or angina. She was able to function well until 05/2023 when she was undergoing physical therapy with two medications. She gradually reduced her Cymbalta dosage and switched to Lyrica 50 mg, but her symptoms remained the same. She is unsure if she needed Cymbalta or tamoxifen worsened joint pain. She was advised to take tamoxifen for 5 years until menopause, after which an alternative medication can be considered. She had a consultation with Dr. Leigh on Wednesday, who suggested increasing the Lyrica dosage. She is considering increasing the Lyrica dosage to 75 mg. A scalp biopsy conducted by Dr. Hilton was negative. She experiences swelling in her hands at the end of the day, which usually subsides in the morning. She denies any lupus symptoms. She occasionally experiences a pulling sensation when she takes a deep breath, which she attributes to her neck.  Additional testing by Dr. Jessica Ross revealed Hashimoto's thyroiditis.     She was diagnosed with breast cancer. Her first mammogram was in 10/2023. They did a biopsy in 2023. They did not get enough margin, so they went for another biopsy and found some cancer cells.She underwent a lumpectomy for her cancer.    Supplemental Information They wanted to do radiation, so she finished five radiation treatments in 2024. She did not have to do chemotherapy. She was started on tamoxifen a couple of months ago.   Both of her parents  of breast cancer.       Result Review :        Results   lab-nl TFTs; CMP NL except glucose 106;CBCnl .          Subjective     Allergies   Allergen Reactions    Sulfa Antibiotics Rash         Current Outpatient Medications:     atorvastatin (LIPITOR) 10 MG tablet, TAKE ONE TABLET BY MOUTH EVERY DAY FOR CHOLESTEROL, Disp: 90 tablet, Rfl: 1    meloxicam (MOBIC) 15 MG tablet, Take 1 tablet by mouth Daily., Disp: 30 tablet, Rfl: 5    pregabalin (LYRICA) 75 MG capsule, Take 1 capsule by mouth 2 (Two) Times a Day. (Patient taking differently: Take 50 mg by mouth 2 (Two) Times a Day. 50 MG BID), Disp: 60 capsule, Rfl: 2    SUMAtriptan (IMITREX) 25 MG tablet, Take one tablet at onset of headache. May repeat dose one time in 2 hours if headache not relieved., Disp: 12 tablet, Rfl: 5    tamoxifen (NOLVADEX) 20 MG chemo tablet, Take 1 tablet by mouth Daily., Disp: 90 tablet, Rfl: 3    tiZANidine (ZANAFLEX) 4 MG tablet, Take 1 tablet by mouth every night at bedtime. Caution sedation, Disp: 90 tablet, Rfl: 1    Vascepa 1 g capsule capsule, Take 2 tabs po BID For cholesterol, Disp: 360 capsule, Rfl: 1    Cholecalciferol (Vitamin D3) 50 MCG (2000 UT) capsule, Take 1 capsule by mouth Daily., Disp: , Rfl:     dicyclomine (BENTYL) 10 MG capsule, Take 1 capsule by mouth 3 times a day., Disp: , Rfl:     DULoxetine (CYMBALTA) 60 MG capsule, Take 1 capsule by mouth Daily., Disp: , Rfl:      famotidine (PEPCID) 20 MG tablet, Take 1 tablet by mouth 2 (Two) Times a Day., Disp: , Rfl:     ferrous sulfate 325 (65 FE) MG tablet, Take 1 tablet by mouth Daily., Disp: , Rfl:     LEVONORGESTREL-ETHINYL ESTRAD PO, Take 1 tablet by mouth Daily., Disp: , Rfl:     omeprazole (priLOSEC) 40 MG capsule, Take  by mouth See Admin Instructions. take 1 capsule by oral route  every day before a meal, Disp: , Rfl:     spironolactone (ALDACTONE) 50 MG tablet, Take 2 tablets by mouth Daily., Disp: , Rfl:     vitamin D (ERGOCALCIFEROL) 1.25 MG (35909 UT) capsule capsule, Take 1 capsule by mouth 1 (One) Time Per Week., Disp: , Rfl:     Past Medical History:   Diagnosis Date    Anxiety     Arthritis     Arthritis of neck currently    BRCA1 negative     BRCA2 negative     Breast cancer     Cancer     Cervical arthritis 2023    Cervical disc disorder 2021    Chronic cervical radiculopathy 2023    Chronic pain disorder From surgery 22    Condition not found     Still birth 25 weeks.    Diabetes mellitus Gestational    Dysphagia     Secondary to cervical surgery    Frozen shoulder approx year ago        GERD (gastroesophageal reflux disease)     Gestational diabetes     Headache, tension-type     Hypercholesteremia     Iron deficiency     Low back pain neck    2021    Migraines     Mild depression 10/17/2022    Multiple gestation     Neck pain     PONV (postoperative nausea and vomiting)     Urinary tract infection     Varicella     Varicose veins of lower extremity     Vitamin D deficiency         Past Surgical History:   Procedure Laterality Date    ANTERIOR CERVICAL DISCECTOMY W/ FUSION N/A 2022    Procedure: CERVICAL DISCECTOMY ANTERIOR WITH FUSION C5-6;  Surgeon: Giovany Enriquez MD;  Location: [facility];  Service: Neurosurgery;  Laterality: N/A;    BREAST SURGERY       SECTION  2010    Boy: Hitesh     SECTION PRIMARY  2002    Boy: [place]    CYSTOSCOPY      X 2     DILATATION AND CURETTAGE      EYE SURGERY      RETINAL DETACHMENT X3    HYSTEROSCOPY W/ POLYPECTOMY  03/04/2022    with myosure lite, D&C    NECK SURGERY  Dec 13, 2022    SPINAL FUSION  Dec 13, 2022    C5-C6,   ACDF    WISDOM TOOTH EXTRACTION  1986       Family History   Problem Relation Age of Onset    Cancer Mother         Breast    Asthma Mother     Breast cancer Mother 71    Vision loss Mother     Arthritis Mother     Cancer Father         Breast    Arthritis Father     Breast cancer Father 68    Diabetes Father     Hyperlipidemia Father     GI problems Father     Cancer Maternal Grandmother     Diabetes Maternal Grandmother     Ovarian cancer Maternal Grandmother     Uterine cancer Maternal Grandmother     Heart disease Maternal Grandmother     Heart disease Maternal Grandfather     Diabetes Paternal Grandmother     Anxiety disorder Paternal Grandmother     Thyroid disease Paternal Grandmother     Coronary artery disease Paternal Grandfather     Stroke Paternal Grandfather     Heart disease Paternal Grandfather     Alzheimer's disease Paternal Grandfather     Miscarriages / Stillbirths Daughter     GI problems Daughter     Migraines Daughter     Arthritis Maternal Aunt     Cancer Maternal Aunt     Ovarian cancer Maternal Aunt     Anxiety disorder Paternal Aunt     Depression Paternal Aunt     Miscarriages / Stillbirths Paternal Aunt     Colon cancer Neg Hx         Social History     Socioeconomic History    Marital status:     Number of children: 2   Tobacco Use    Smoking status: Never    Smokeless tobacco: Never   Vaping Use    Vaping status: Never Used   Substance and Sexual Activity    Alcohol use: Never    Drug use: Never    Sexual activity: Yes     Partners: Male     Birth control/protection: None     Comment: currently discussing lab results       Review of Systems   Constitutional:  Positive for unexpected weight gain. Negative for fatigue and fever.   HENT:  Negative for mouth sores,  "nosebleeds, swollen glands and trouble swallowing.    Eyes:  Negative for blurred vision, double vision, photophobia, pain and visual disturbance.   Respiratory:  Negative for apnea, cough, choking and shortness of breath.    Cardiovascular:  Negative for chest pain, palpitations and leg swelling.   Gastrointestinal:  Negative for abdominal pain, blood in stool, constipation, diarrhea, nausea, vomiting and GERD.        Bloating   Endocrine: Negative for cold intolerance, heat intolerance, polydipsia, polyphagia and polyuria.        Hot flashes   Genitourinary:  Negative for difficulty urinating, dysuria, genital sores, hematuria and urinary incontinence.   Musculoskeletal:  Negative for arthralgias, back pain, gait problem, joint swelling, myalgias, neck pain, neck stiffness and bursitis.   Skin:  Negative for rash.   Allergic/Immunologic: Negative for environmental allergies and food allergies.   Neurological:  Negative for dizziness, tremors, seizures, syncope, weakness, numbness, headache, memory problem and confusion.   Hematological:  Negative for adenopathy. Does not bruise/bleed easily.   Psychiatric/Behavioral:  Negative for sleep disturbance, suicidal ideas, depressed mood and stress. The patient is not nervous/anxious.         I have reviewed and updated the patient's chief complaint, history of present illness, review of systems, past medical history, surgical history, family history, social history, medications and allergy list as appropriate.     Objective    Vital Signs:   Vitals:    07/22/24 0845   BP: 118/70   BP Location: Left arm   Pulse: 76   Temp: 97 °F (36.1 °C)   Weight: 72.2 kg (159 lb 1 oz)   Height: 167.6 cm (66\")   PainSc:   3   PainLoc: Neck  Comment: Knucles, left toes, rt arm     Francesca Manning reports a pain score of 3.  Given her pain assessment as noted, treatment options were discussed and the following options were decided upon as a follow-up plan to address the patient's pain: " see below.      Body mass index is 25.67 kg/m².         Physical Exam  Constitutional:       General: She is not in acute distress.     Appearance: She is not ill-appearing, toxic-appearing or diaphoretic.   HENT:      Right Ear: External ear normal.      Left Ear: External ear normal.      Nose: Nose normal. No congestion.      Mouth/Throat:      Pharynx: Oropharynx is clear. No oropharyngeal exudate or posterior oropharyngeal erythema.   Eyes:      Extraocular Movements: Extraocular movements intact.      Conjunctiva/sclera: Conjunctivae normal.      Pupils: Pupils are equal, round, and reactive to light.   Cardiovascular:      Rate and Rhythm: Normal rate and regular rhythm.      Pulses: Normal pulses.      Heart sounds: Normal heart sounds.   Pulmonary:      Effort: Pulmonary effort is normal.      Breath sounds: Normal breath sounds.   Abdominal:      General: Abdomen is flat. Bowel sounds are normal.      Palpations: Abdomen is soft.   Musculoskeletal:         General: Normal range of motion.   Skin:     General: Skin is warm and dry.      Capillary Refill: Capillary refill takes less than 2 seconds.   Neurological:      General: No focal deficit present.      Mental Status: She is oriented to person, place, and time.      Cranial Nerves: No cranial nerve deficit.      Motor: No weakness.      Deep Tendon Reflexes: Reflexes normal.   Psychiatric:         Mood and Affect: Mood normal.         Behavior: Behavior normal.         Thought Content: Thought content normal.        Physical Exam      Physical Exam  There is currently no information documented on the homunculus. Go to the Rheumatology activity and complete the homunculus joint exam.     Results Review:   Imaging Results (Last 24 Hours)       ** No results found for the last 24 hours. **            Procedures    Assessment / Plan    Assessment/Plan:   Diagnoses and all orders for this visit:    1. DDD (degenerative disc disease), cervical  (Primary)  Assessment & Plan:  Sx started approx two years ago with radicular sx in RUE down to hand with intermittent numbness.  H/o Cervical fusion 12/22- C5-6 Dr. Enriquez. initial resolution of RUE pain however gradually has came back and radiates to right hand 4th and 5th digits. Denies numbness.  Hair loss with Topamax and Lyrica ( helped her pain). NOTE hair loss continues which suggest it may not be related.  Cymbalta 40mg stopped since last visit.  Has seen Dr. Smith in [place], Pre and postop EMG/NCV negative.   Doing well currently without much pain. 3/10    Plan:  Tylenol Arthritis 2 twice daily as needed- [employer] generic or brand.  restart meloxicam 15mg per day with food   She was referred here for eval of systemic CTD. Based on signs and sx I cannot make diagnosis of that.   On Lyrica  Consider restarting Cymbalta  Heat, ice, massage,Biofreeze.          2. Positive SHELIA (antinuclear antibody)  Assessment & Plan:  1:160 speckled.  Other serologies negative.    Plan:  NO CURRENT SIGNS/SX CTD.  SPF 50 +  If she develops sx that we discussed she can return to clinic sooner otherwise I will see her annually.       Other orders  -     meloxicam (MOBIC) 15 MG tablet; Take 1 tablet by mouth Daily.  Dispense: 30 tablet; Refill: 5      3. Joint pain  OA sign/sx  Possible some may be Tamoxifen, but tolerable  Plan:  Continue meloxicam  Tylenol Arthritis as needed  Consider restarting cymbalta.    Assessment & Plan      Time spent 30 minutes-greater than 50% in discussion regarding interim history, review of CTD symptoms, treatment of joint and neck pain.    Follow Up:   Return in about 6 months (around 1/22/2025).     Consent obtained to use RENE recording.    Marcelina Durant MD  [facility]

## 2024-07-22 NOTE — ASSESSMENT & PLAN NOTE
Sx started approx two years ago with radicular sx in RUE down to hand with intermittent numbness.  H/o Cervical fusion 12/22- C5-6 Dr. Enriquez. initial resolution of RUE pain however gradually has came back and radiates to right hand 4th and 5th digits. Denies numbness.  Hair loss with Topamax and Lyrica ( helped her pain). NOTE hair loss continues which suggest it may not be related.  Cymbalta 40mg stopped since last visit.  Has seen Dr. Smith in PM, Pre and postop EMG/NCV negative.   Doing well currently without much pain. 3/10    Plan:  Tylenol Arthritis 2 twice daily as needed- [employer] generic or brand.  restart meloxicam 15mg per day with food   She was referred here for eval of systemic CTD. Based on signs and sx I cannot make diagnosis of that.   On Lyrica  Consider restarting Cymbalta  Heat, ice, massage,Biofreeze.

## 2024-07-24 ENCOUNTER — TELEPHONE (OUTPATIENT)
Dept: OBSTETRICS AND GYNECOLOGY | Facility: CLINIC | Age: 54
End: 2024-07-24
Payer: COMMERCIAL

## 2024-07-24 NOTE — TELEPHONE ENCOUNTER
PATIENT CALLED TO CONFIRMATION FROM DR. OHWARD'S OFFICE AFTER HAVING LABS DONE THROUGH ONCOLOGIST PROVIDER THROUGH [facility]. PATIENT STATED THE MESSAGE THAT WAS SENT ADVISED HER TO GIVE THE OFFICE A CALL BACK WITH ANY QUESTIONS. PLEASE ADVISE.

## 2024-07-24 NOTE — TELEPHONE ENCOUNTER
I called and spoke to patient and she states that her  has had a vasectomy. Patient also wants to know if she needs order to have labs to see if her  FSH level increase.

## 2024-07-26 ENCOUNTER — OFFICE VISIT (OUTPATIENT)
Dept: OBSTETRICS AND GYNECOLOGY | Facility: CLINIC | Age: 54
End: 2024-07-26
Payer: COMMERCIAL

## 2024-07-26 VITALS
WEIGHT: 160 LBS | BODY MASS INDEX: 25.82 KG/M2 | RESPIRATION RATE: 16 BRPM | DIASTOLIC BLOOD PRESSURE: 70 MMHG | SYSTOLIC BLOOD PRESSURE: 116 MMHG

## 2024-07-26 DIAGNOSIS — Z80.3 FAMILY HISTORY OF BREAST CANCER: ICD-10-CM

## 2024-07-26 DIAGNOSIS — Z12.11 COLON CANCER SCREENING: ICD-10-CM

## 2024-07-26 DIAGNOSIS — Z01.419 ENCOUNTER FOR GYNECOLOGICAL EXAMINATION WITHOUT ABNORMAL FINDING: Primary | ICD-10-CM

## 2024-07-26 DIAGNOSIS — D05.12 DUCTAL CARCINOMA IN SITU (DCIS) OF LEFT BREAST: ICD-10-CM

## 2024-07-26 NOTE — PROGRESS NOTES
Subjective   Chief Complaint   Patient presents with    Annual Exam     Francesca Manning is a 53 y.o. year old  presenting to be seen for her annual exam. She is currently on Tamoxifen and waiting for menopause to be switched to a different medication per her oncologist. FSH and estradiol levels from July support menopause but her LMP was in March. Levels are significantly different from January.     SEXUAL Hx:  She is currently sexually active.  In the past year there there has been NO new sexual partners.    Condoms are never used.  She would not like to be screened for STD's at today's exam.  Current birth control method: vasectomy.  She is happy with her current method of contraception and does not want to discuss alternative methods of contraception.  MENSTRUAL Hx:  Patient's last menstrual period was 2024.  In the past 6 months her cycles have been irregular infrequent.  Her menstrual flow is typically normal.   Each month on average there are roughly 0 day(s) of very heavy flow.    Intermenstrual bleeding is n/a.    Post-coital bleeding is absent.  Dysmenorrhea: mild and is not affecting her activities of daily living  PMS: mild and is not affecting her activities of daily living  Her cycles are not a source of concern for her that she wishes to discuss today.  HEALTH Hx:  She exercises regularly: no (but is planning to start exercising more). She had a fall that set her back- but she has plans to start up again  She wears her seat belt: yes.  She has concerns about domestic violence: no.  OTHER THINGS SHE WANTS TO DISCUSS TODAY:  Nothing else    The following portions of the patient's history were reviewed and updated as appropriate:problem list, current medications, allergies, past family history, past medical history, past social history, and past surgical history.    Social History    Tobacco Use      Smoking status: Never      Smokeless tobacco: Never    Past Medical History:   Diagnosis  Date    Anxiety     Arthritis     Arthritis of neck currently    BRCA1 negative     BRCA2 negative     Breast cancer     Cancer     Cervical arthritis 2023    Cervical disc disorder 2021    Chronic cervical radiculopathy 2023    Chronic pain disorder From surgery 22    Condition not found     Still birth 25 weeks.    Diabetes mellitus Gestational    Dysphagia     Secondary to cervical surgery    Frozen shoulder approx year ago        GERD (gastroesophageal reflux disease)     Gestational diabetes     Headache, tension-type     Hypercholesteremia     Iron deficiency     Low back pain neck    2021    Migraines     Mild depression 10/17/2022    Multiple gestation     Neck pain     PONV (postoperative nausea and vomiting)     Urinary tract infection     Varicella     Varicose veins of lower extremity     Vitamin D deficiency      Past Surgical History:   Procedure Laterality Date    ANTERIOR CERVICAL DISCECTOMY W/ FUSION N/A 2022    Procedure: CERVICAL DISCECTOMY ANTERIOR WITH FUSION C5-6;  Surgeon: Giovany Enriquez MD;  Location: [facility];  Service: Neurosurgery;  Laterality: N/A;    BREAST SURGERY       SECTION  2010    Boy: Hitesh     SECTION PRIMARY  2002    Boy: [place]    CYSTOSCOPY      X 2    DILATATION AND CURETTAGE      EYE SURGERY      RETINAL DETACHMENT X3    HYSTEROSCOPY W/ POLYPECTOMY  2022    with myosure lite, D&C    NECK SURGERY  Dec 13, 2022    SPINAL FUSION  Dec 13, 2022    C5-C6,   ACDF    WISDOM TOOTH EXTRACTION           Review of Systems   Constitutional: Negative.  Negative for appetite change and diaphoresis.   Respiratory: Negative.     Cardiovascular: Negative.    Gastrointestinal: Negative.  Negative for abdominal distention, blood in stool, GERD and indigestion.   Genitourinary: Negative.  Negative for breast discharge, breast lump, breast pain, dysuria, hematuria and urinary incontinence.   Skin: Negative.            Objective   /70   Resp 16   Wt 72.6 kg (160 lb)   LMP 03/01/2024   BMI 25.82 kg/m²     Physical Exam  Vitals and nursing note reviewed. Exam conducted with a chaperone present.   Constitutional:       Appearance: Normal appearance.   Cardiovascular:      Rate and Rhythm: Normal rate and regular rhythm.      Heart sounds: Normal heart sounds.   Pulmonary:      Effort: Pulmonary effort is normal.      Breath sounds: Normal breath sounds.   Chest:   Breasts:     Right: Normal.      Left: Inverted nipple present.      Comments: Left nipple inverted, slightly reddened. No s/s infection or yeast dermatitis  Abdominal:      General: Bowel sounds are normal.      Palpations: Abdomen is soft.   Genitourinary:     General: Normal vulva.      Exam position: Lithotomy position.      Vagina: Normal.      Cervix: Normal.      Uterus: Normal.       Adnexa: Right adnexa normal and left adnexa normal.      Rectum: Normal.      Comments: Mild erythematous patches in vaginal canal consistent with decreased estrogen  Neurological:      Mental Status: She is alert and oriented to person, place, and time.   Psychiatric:         Mood and Affect: Mood normal.         Behavior: Behavior normal.         Thought Content: Thought content normal.         Judgment: Judgment normal.            Diagnoses and all orders for this visit:    1. Encounter for gynecological examination without abnormal finding (Primary)  -     Cologuard - Stool, Per Rectum; Future    2. Family history of breast cancer    3. Ductal carcinoma in situ (DCIS) of left breast    4. Colon cancer screening  -     Cologuard - Stool, Per Rectum; Future    Recommended water based over the counter lubricant for vaginal dryness. May also look into OTC vaginal moisturizers.     No prescription was given or electronically sent at today's visit    The importance of keeping all planned follow-up and taking all medications as prescribed was emphasized.    Today I  discussed with Francesca the total recommended calcium intake for a premenopausal female is 1000 mg.  Ideally this should be from dietary sources.  I reviewed calcium content in various foods including milk, fortified orange juice and yogurt.  If she cannot get sufficient calcium through dietary means, it is recommended to supplement with either a multivitamin or calcium to reach her daily goal.  I also reviewed the difference in the bioavailability of calcium carbonate and calcium citrate containing supplements and the importance of taking calcium carbonate containing products with food.  Finally, vitamin D's role in calcium absorption was reviewed and a total daily vitamin D intake of 800 units was recommended.    I discussed with Francesca that she may be behind on needed vaccinations for  vaccines are up to date .  She may be able to obtain these vaccinations at her local pharmacy OR speak about obtaining them with her primary care.  If she does obtain her vaccines, I have asked Francesca to let us know the date each vaccine was obtained so that her medical record could be updated in our system.    No orders of the defined types were placed in this encounter.           Return in about 1 year (around 7/26/2025) for Annual physical.    Liliana Salas, TERE  July 26, 2024

## 2024-08-07 ENCOUNTER — OFFICE VISIT (OUTPATIENT)
Dept: ONCOLOGY | Facility: CLINIC | Age: 54
End: 2024-08-07
Payer: COMMERCIAL

## 2024-08-07 VITALS
SYSTOLIC BLOOD PRESSURE: 126 MMHG | DIASTOLIC BLOOD PRESSURE: 84 MMHG | HEIGHT: 66 IN | TEMPERATURE: 98.1 F | WEIGHT: 160 LBS | BODY MASS INDEX: 25.71 KG/M2 | OXYGEN SATURATION: 96 % | HEART RATE: 86 BPM

## 2024-08-07 DIAGNOSIS — C50.911 MALIGNANT NEOPLASM OF RIGHT BREAST IN FEMALE, ESTROGEN RECEPTOR POSITIVE, UNSPECIFIED SITE OF BREAST: Primary | ICD-10-CM

## 2024-08-07 DIAGNOSIS — Z17.0 MALIGNANT NEOPLASM OF RIGHT BREAST IN FEMALE, ESTROGEN RECEPTOR POSITIVE, UNSPECIFIED SITE OF BREAST: Primary | ICD-10-CM

## 2024-08-07 PROCEDURE — 99213 OFFICE O/P EST LOW 20 MIN: CPT | Performed by: INTERNAL MEDICINE

## 2024-08-07 NOTE — PATIENT INSTRUCTIONS
Stop tamoxifen and call me in 2-4 weeks with an update.   If your symptoms resolve and you would like to start low dose tamoxifen, I will send in a script for 10 mg tablets, take 1/2 tablet daily.   If you decide not to resume tamoxifen, you can follow up with  for mammograms and see us as needed.

## 2024-08-07 NOTE — PROGRESS NOTES
Hematology and On[facility] [place]  Office number [phone]    Fax number [phone]     Follow up     Date: 24    Patient Name: Francesca Manning  MRN: [id number]  : 1970    Referring Physician: Dr. Dyan Harris MD    Chief Complaint: Left breast DCIS    Cancer [place]    History of Present Illness: Francesca Manning is a pleasant 53 y.o. female who presents today for evaluation of left breast DCIS.     Screening mammogram 2023 demonstrated microcalcifications in the retroareolar left breast which persisted on diagnostic mammogram.  These were not amenable to stereotactic biopsy.  She underwent a breast MRI 2023 which showed no suspicious mass, enhancement, or adenopathy.  She was referred for surgical excisional biopsy.    Excisional biopsy on 10/24/2023 demonstrated intermediate grade DCIS with microcalcifications and necrosis.  ER 90% and WA 10%.Margins positive.  She underwent reexcision 2023 with no residual DCIS.  Margins negative.      She is recovering well from surgery and presents to multidisciplinary breast clinic for an opinion regarding adjuvant management of recently diagnosed DCIS.  Postoperative breast pain is well controlled.  Denies leg swelling, shortness of breath, fever, cough, or other new symptoms.     Breast cancer risk profile:  Age of menarche:14   (stillborn at 25 weeks, Talbot Syndrome); Age of first live birth 31  Age of menopause: postmenopausal, stopped OCP 2 mo ago at diagnosis.  LMP 2023, then 3/2024  Family history of breast, ovarian, prostate or pancreatic cancer: Father  of metastatic breast cancer at 68;  mom breast cancer in her 70s. Maternal aunt ovary cancer in her 50s. Maternal grandmother  of metastatic cancer at 69  Genetics: negative Myriad BRCA 1, 2 gene sequencing .  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     Treatment history:  Radiation completed 24.   Tamoxifen, 24 to present    Interval history:  Completing PT for fascial pain in her right arm/shoulder.   She started multiple supplements with her chiropractor including Enzycore, ME support, Parotic PMG, Tox, Chord, Wheat germ oil.   She is tolerating tamoxifen well.   She endorses stable fatigue that has not worsened on tamoxifen  While taking tamoxifen, bilateral hands from knuckles down and toes will get tight     Past Medical History:   Past Medical History:   Diagnosis Date    Anxiety     Arthritis     Arthritis of neck currently    BRCA1 negative     BRCA2 negative     Breast cancer     Cancer     Cervical arthritis 2023    Cervical disc disorder 2021    Chronic cervical radiculopathy 2023    Chronic pain disorder From surgery 22    Condition not found     Still birth 25 weeks.    Diabetes mellitus Gestational    Dysphagia     Secondary to cervical surgery    Frozen shoulder approx year ago        GERD (gastroesophageal reflux disease)     Gestational diabetes     Headache, tension-type     Hypercholesteremia     Iron deficiency     Low back pain neck    2021    Migraines     Mild depression 10/17/2022    Multiple gestation     Neck pain     PONV (postoperative nausea and vomiting)     Urinary tract infection     Varicella     Varicose veins of lower extremity     Vitamin D deficiency        Past Surgical History:   Past Surgical History:   Procedure Laterality Date    ANTERIOR CERVICAL DISCECTOMY W/ FUSION N/A 2022    Procedure: CERVICAL DISCECTOMY ANTERIOR WITH FUSION C5-6;  Surgeon: Giovany Enriquez MD;  Location: [facility];  Service: Neurosurgery;  Laterality: N/A;    BREAST SURGERY       SECTION  2010    Boy: Hitesh     SECTION PRIMARY  2002    Boy: [place]    CYSTOSCOPY      X 2    DILATATION AND CURETTAGE      EYE SURGERY      RETINAL DETACHMENT X3    HYSTEROSCOPY  W/ POLYPECTOMY  03/04/2022    with myosure lite, D&C    NECK SURGERY  Dec 13, 2022    SPINAL FUSION  Dec 13, 2022    C5-C6,   ACDF    WISDOM TOOTH EXTRACTION  1986       Family History:   Family History   Problem Relation Age of Onset    Cancer Mother         Breast    Asthma Mother     Breast cancer Mother 71    Vision loss Mother     Arthritis Mother     Cancer Father         Breast    Arthritis Father     Breast cancer Father 68    Diabetes Father     Hyperlipidemia Father     GI problems Father     Cancer Maternal Grandmother     Diabetes Maternal Grandmother     Ovarian cancer Maternal Grandmother     Uterine cancer Maternal Grandmother     Heart disease Maternal Grandmother     Heart disease Maternal Grandfather     Diabetes Paternal Grandmother     Anxiety disorder Paternal Grandmother     Thyroid disease Paternal Grandmother     Coronary artery disease Paternal Grandfather     Stroke Paternal Grandfather     Heart disease Paternal Grandfather     Alzheimer's disease Paternal Grandfather     Miscarriages / Stillbirths Daughter     GI problems Daughter     Migraines Daughter     Arthritis Maternal Aunt     Cancer Maternal Aunt     Ovarian cancer Maternal Aunt     Anxiety disorder Paternal Aunt     Depression Paternal Aunt     Miscarriages / Stillbirths Paternal Aunt     Colon cancer Neg Hx        Social History:   Social History     Socioeconomic History    Marital status:     Number of children: 2   Tobacco Use    Smoking status: Never    Smokeless tobacco: Never   Vaping Use    Vaping status: Never Used   Substance and Sexual Activity    Alcohol use: Never    Drug use: Never    Sexual activity: Yes     Partners: Male     Birth control/protection: Vasectomy       Medications:     Current Outpatient Medications:     atorvastatin (LIPITOR) 10 MG tablet, TAKE ONE TABLET BY MOUTH EVERY DAY FOR CHOLESTEROL, Disp: 90 tablet, Rfl: 1    meloxicam (MOBIC) 15 MG tablet, Take 1 tablet by mouth Daily. (Patient not  "taking: Reported on 7/26/2024), Disp: 30 tablet, Rfl: 5    pregabalin (LYRICA) 75 MG capsule, Take 1 capsule by mouth 2 (Two) Times a Day. (Patient taking differently: Take 50 mg by mouth 2 (Two) Times a Day. 50 MG BID), Disp: 60 capsule, Rfl: 2    SUMAtriptan (IMITREX) 25 MG tablet, Take one tablet at onset of headache. May repeat dose one time in 2 hours if headache not relieved., Disp: 12 tablet, Rfl: 5    tamoxifen (NOLVADEX) 20 MG chemo tablet, Take 1 tablet by mouth Daily., Disp: 90 tablet, Rfl: 3    tiZANidine (ZANAFLEX) 4 MG tablet, Take 1 tablet by mouth every night at bedtime. Caution sedation, Disp: 90 tablet, Rfl: 1    Vascepa 1 g capsule capsule, Take 2 tabs po BID For cholesterol, Disp: 360 capsule, Rfl: 1    Allergies:   Allergies   Allergen Reactions    Sulfa Antibiotics Rash       Objective     Vital Signs:   Vitals:    08/07/24 1537   BP: 126/84   Pulse: 86   Temp: 98.1 °F (36.7 °C)   TempSrc: Temporal   SpO2: 96%   Weight: 72.6 kg (160 lb)   Height: 167.6 cm (65.98\")   PainSc: 0-No pain    Body mass index is 25.84 kg/m².   Pain Score    08/07/24 1537   PainSc: 0-No pain       ECOG Performance Status: 0    Physical Exam:   General: No acute distress. Well appearing   HEENT: Normocephalic, atraumatic. Sclera anicteric.   Neck: supple, no adenopathy.   Cardiovascular: regular rate and rhythm. No murmurs.   Respiratory: Normal rate. Clear to auscultation bilaterally  Abdomen: Soft, nontender, non distended with normoactive bowel sounds  Lymph: no cervical, supraclavicular or axillary adenopathy  Neuro: Alert and oriented x 3. No focal deficits.   Ext: Symmetric, no swelling.   Breast: lumpectomy incisions well healing.  No palpable masses.      Laboratory/Imaging Reviewed:   No visits with results within 2 Week(s) from this visit.   Latest known visit with results is:   Office Visit on 07/17/2024   Component Date Value Ref Range Status    FSH 07/17/2024 24.6  mIU/mL Final                         Adult " "Female             Range                        Follicular phase      3.5 -  12.5                        Ovulation phase       4.7 -  21.5                        Luteal phase          1.7 -   7.7                        Postmenopausal       25.8 - 134.8    Estradiol 07/17/2024 6.7  pg/mL Final                         Adult Female             Range                        Follicular phase     12.5 - 166.0                        Ovulation phase      85.8 - 498.0                        Luteal phase         43.8 - 211.0                        Postmenopausal       <6.0 -  54.7                       Pregnancy                        1st trimester     215.0 - >4300.0  Roche ECLIA methodology    Glucose 07/17/2024 106 (H)  70 - 99 mg/dL Final    BUN 07/17/2024 12  6 - 24 mg/dL Final    Creatinine 07/17/2024 0.76  0.57 - 1.00 mg/dL Final    EGFR Result 07/17/2024 94  >59 mL/min/1.73 Final    BUN/Creatinine Ratio 07/17/2024 16  9 - 23 Final    Sodium 07/17/2024 140  134 - 144 mmol/L Final    Potassium 07/17/2024 4.3  3.5 - 5.2 mmol/L Final    Chloride 07/17/2024 104  96 - 106 mmol/L Final    Total CO2 07/17/2024 20  20 - 29 mmol/L Final    Calcium 07/17/2024 9.5  8.7 - 10.2 mg/dL Final    Total Protein 07/17/2024 7.6  6.0 - 8.5 g/dL Final    Albumin 07/17/2024 4.7  3.8 - 4.9 g/dL Final    Globulin 07/17/2024 2.9  1.5 - 4.5 g/dL Final    Total Bilirubin 07/17/2024 0.4  0.0 - 1.2 mg/dL Final    Alkaline Phosphatase 07/17/2024 111  44 - 121 IU/L Final    AST (SGOT) 07/17/2024 14  0 - 40 IU/L Final    ALT (SGPT) 07/17/2024 27  0 - 32 IU/L Final    TSH 07/17/2024 1.380  0.450 - 4.500 uIU/mL Final    Free T4 07/17/2024 1.11  0.82 - 1.77 ng/dL Final       No results found.  No results found.    Procedures    Assessment / Plan      Assessment/Plan:      Left breast DCIS, ER positive   Tamoxifen monitoring  Arthralgias  -We reviewed that DCIS represents a non-invasive or \"precancerous\" condition. -She has had surgery to remove the " existing DCIS.  Radiation will further reduce her risk of ipsilateral recurrence.  We reviewed the role of endocrine therapy to reduce the risk of future malignancy in the ipsilateral and contralateral breast.  There is no evidence that this increases her survival, but would decrease the odds of subsequent malignancy.  We discussed that the alternative would be to forgo endocrine therapy and proceed with close observation with exams and mammography.   -Her menses have resumed.  She is interested in endocrine prophylaxis to lower her risk for future breast cancer.  We reviewed side effects and schedule of tamoxifen. We specifically reviewed the risk for serious or fatal venous thromboembolism, hot flashes, muscle symptoms, hepatotoxicity, vaginal dryness and cataracts. We discussed the risk for endometrial hyperplasia/malignancies.  She should have annual gynecology exams and seek attention for any abnormal vaginal bleeding. She should avoid pregnancy due to teratogenicity.   -Informed consent was obtained and the patient elected to proceed.  -Avoid CYP-2D6 inhibitors due to drug interactions where possible.  I did review her pharmacodynamic testing that she had previously obtained independently.  -Encouraged annual GYN exam, UTD.  -DVT precautions reviewed. The medication should be held for elective surgeries.  She was encouraged to remain well hydrated and ambulate frequently in high risk travel situations.   -She will have a CMP drawn in 4 weeks which will be 8 weeks from tamoxifen initiation.  Thus far she is tolerating the medication well.  Patient Instructions   Stop tamoxifen and call me in 2-4 weeks with an update.   If your symptoms resolve and you would like to start low dose tamoxifen, I will send in a script for 10 mg tablets, take 1/2 tablet daily.   If you decide not to resume tamoxifen, you can follow up with  for mammograms and see us as needed.     3. Strong family history of breast cancer  including male breast cancer and ovarian cancer  - As she is heterozygous BLM carrier, no additional screening was recommended per genetics.        Follow Up:   Pending above       Selena Cervantes MD  Hematology and Oncology

## 2024-08-22 DIAGNOSIS — M54.12 CHRONIC CERVICAL RADICULOPATHY: Primary | ICD-10-CM

## 2024-08-23 ENCOUNTER — HOSPITAL ENCOUNTER (OUTPATIENT)
Dept: MRI IMAGING | Facility: HOSPITAL | Age: 54
Discharge: HOME | End: 2024-08-23
Admitting: OBSTETRICS & GYNECOLOGY
Payer: COMMERCIAL

## 2024-08-23 DIAGNOSIS — Z91.89 INCREASED RISK OF BREAST CANCER: ICD-10-CM

## 2024-08-23 PROCEDURE — 77049 MRI BREAST C-+ W/CAD BI: CPT

## 2024-08-23 PROCEDURE — A9577 INJ MULTIHANCE: HCPCS | Performed by: OBSTETRICS & GYNECOLOGY

## 2024-08-23 PROCEDURE — 0 GADOBENATE DIMEGLUMINE 529 MG/ML SOLUTION: Performed by: OBSTETRICS & GYNECOLOGY

## 2024-08-23 RX ADMIN — GADOBENATE DIMEGLUMINE 15 ML: 529 INJECTION, SOLUTION INTRAVENOUS at 08:51

## 2024-08-26 ENCOUNTER — OFFICE VISIT (OUTPATIENT)
Dept: PAIN MEDICINE | Facility: CLINIC | Age: 54
End: 2024-08-26
Payer: COMMERCIAL

## 2024-08-26 VITALS — WEIGHT: 158.4 LBS | HEIGHT: 66 IN | BODY MASS INDEX: 25.46 KG/M2

## 2024-08-26 DIAGNOSIS — M54.12 CHRONIC CERVICAL RADICULOPATHY: Primary | ICD-10-CM

## 2024-08-26 DIAGNOSIS — M54.12 CERVICAL RADICULOPATHY: ICD-10-CM

## 2024-08-26 PROCEDURE — 99213 OFFICE O/P EST LOW 20 MIN: CPT

## 2024-08-26 NOTE — PROGRESS NOTES
Referring Physician: No referring provider defined for this encounter.    Primary Physician: Dominique Blunt PA-C    CHIEF COMPLAINT or REASON FOR VISIT: Follow-up and Neck Pain      Initial history of present illness on 01/08/2024:  Ms. Francesca Manning is 53 y.o. female who presents as a new patient referral for evaluation treatment chronic no other radiation of the right upper extremity.  Patient has past medical history of a C5-6 ACDF with Dr. Enriquez.  Prior to that surgery she is primary complaining of right neck and shoulder pain; subsequent to the surgery patient continues to complain of right upper extremity pain however this is now underneath the armpit and into this small and ring fingers.  She additionally reports a recent onset of right rib pain associated with severe coughing secondary to RSV.  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    She primarily complains of numbness tingling and pain underneath her right armpit and into the small and ring fingers.  She also reports reduced  strength in her right hand (right-hand-dominant).  She has had an EMG/NCV with Dr. Andrews which did not demonstrate any focal nerve entrapment or radiculopathy.  She was evaluated further by Dr. Enriquez who did not identify any role for surgical intervention and referred to pain management.  Patient denies any bowel or bladder dysfunction, lower extremity weakness, new onset saddle anesthesia or unexplained weight loss.   She has tried Cymbalta which was helpful; PCP discontinued Cymbalta in order to start pregabalin which has not been helpful.  She has noticed an increase in her pain since stopping Cymbalta.      Interval history:  Patient returns to clinic today.  At her last office visit she was set up for a cervical epidural steroid injection however this procedure did not take place.  She continues to complain of chronic neck  pain with radiation to the right upper extremity.  This pain will radiate down her left arm into her fourth digit of her right hand.  She reports she has been experiencing little more neck pain than usual.  She was moving a mini refrigerator which is caused her more issues.  She denies any other new injuries or events.  She denies any clumsiness of the hands or trouble walking.  She is following with an integrative medicine specialist, and continues to take pregabalin twice daily.  She reports she is not particularly interested in an injection at this time but will plan to contact the office if she changes her mind or her symptoms worsen.        Interventions:      Objective Pain Scoring:   BRIEF PAIN INVENTORY:  Total score:   Pain Score    08/26/24 0755   PainSc:   3   PainLoc: Neck          PHQ-2: PHQ-2 Total Score: 2  PHQ-9: PHQ-9: Brief Depression Severity Measure Score: 8  Opioid Risk Tool:         Review of Systems:   ROS negative except as otherwise noted     Past Medical History:   Past Medical History:   Diagnosis Date    Anxiety     Arthritis     Arthritis of neck currently    BRCA1 negative     BRCA2 negative     Breast cancer     Cancer     Cervical arthritis 07/11/2023    Cervical disc disorder Feb 2021    Chronic cervical radiculopathy 06/01/2023    Chronic pain disorder From surgery 12/13/22    Condition not found     Still birth 25 weeks.    Diabetes mellitus Gestational    Dysphagia     Secondary to cervical surgery    Frozen shoulder approx year ago    2022    GERD (gastroesophageal reflux disease)     Gestational diabetes     Headache, tension-type     Hypercholesteremia     Iron deficiency     Low back pain neck    12/2021    Migraines     Mild depression 10/17/2022    Multiple gestation     Neck pain     PONV (postoperative nausea and vomiting)     Urinary tract infection     Varicella     Varicose veins of lower extremity     Vitamin D deficiency          Past Surgical History:   Past Surgical  History:   Procedure Laterality Date    ANTERIOR CERVICAL DISCECTOMY W/ FUSION N/A 2022    Procedure: CERVICAL DISCECTOMY ANTERIOR WITH FUSION C5-6;  Surgeon: Giovany Enriquez MD;  Location: [facility];  Service: Neurosurgery;  Laterality: N/A;    BREAST SURGERY       SECTION  2010    Boy: Hitesh     SECTION PRIMARY  2002    Boy: [place]    CYSTOSCOPY      X 2    DILATATION AND CURETTAGE      EYE SURGERY      RETINAL DETACHMENT X3    HYSTEROSCOPY W/ POLYPECTOMY  2022    with myosure lite, D&C    NECK SURGERY  Dec 13, 2022    SPINAL FUSION  Dec 13, 2022    C5-C6,   ACDF    WISDOM TOOTH EXTRACTION           Family History   Family History   Problem Relation Age of Onset    Cancer Mother         Breast    Asthma Mother     Breast cancer Mother 71    Vision loss Mother     Arthritis Mother     Cancer Father         Breast    Arthritis Father     Breast cancer Father 68    Diabetes Father     Hyperlipidemia Father     GI problems Father     Cancer Maternal Grandmother     Diabetes Maternal Grandmother     Ovarian cancer Maternal Grandmother     Uterine cancer Maternal Grandmother     Heart disease Maternal Grandmother     Heart disease Maternal Grandfather     Diabetes Paternal Grandmother     Anxiety disorder Paternal Grandmother     Thyroid disease Paternal Grandmother     Coronary artery disease Paternal Grandfather     Stroke Paternal Grandfather     Heart disease Paternal Grandfather     Alzheimer's disease Paternal Grandfather     Miscarriages / Stillbirths Daughter     GI problems Daughter     Migraines Daughter     Arthritis Maternal Aunt     Cancer Maternal Aunt     Ovarian cancer Maternal Aunt     Anxiety disorder Paternal Aunt     Depression Paternal Aunt     Miscarriages / Stillbirths Paternal Aunt     Colon cancer Neg Hx          Social History   Social History     Socioeconomic History    Marital status:     Number of children: 2   Tobacco Use     "Smoking status: Never    Smokeless tobacco: Never   Vaping Use    Vaping status: Never Used   Substance and Sexual Activity    Alcohol use: Never    Drug use: Never    Sexual activity: Yes     Partners: Male     Birth control/protection: Vasectomy        Medications:     Current Outpatient Medications:     atorvastatin (LIPITOR) 10 MG tablet, TAKE ONE TABLET BY MOUTH EVERY DAY FOR CHOLESTEROL, Disp: 90 tablet, Rfl: 1    pregabalin (LYRICA) 75 MG capsule, Take 1 capsule by mouth 2 (Two) Times a Day. (Patient taking differently: Take 50 mg by mouth 2 (Two) Times a Day. 50 MG BID), Disp: 60 capsule, Rfl: 2    SUMAtriptan (IMITREX) 25 MG tablet, Take one tablet at onset of headache. May repeat dose one time in 2 hours if headache not relieved., Disp: 12 tablet, Rfl: 5    tiZANidine (ZANAFLEX) 4 MG tablet, Take 1 tablet by mouth every night at bedtime. Caution sedation, Disp: 90 tablet, Rfl: 1    Vascepa 1 g capsule capsule, Take 2 tabs po BID For cholesterol, Disp: 360 capsule, Rfl: 1        Physical Exam:     Vitals:    08/26/24 0755   Weight: 71.8 kg (158 lb 6.4 oz)   Height: 167.6 cm (65.98\")   PainSc:   3   PainLoc: Neck            General: Alert and oriented, No acute distress.   HEENT: Normocephalic, atraumatic.   Cardiovascular: No gross edema  Respiratory: Respirations are non-labored    Cervical Spine:   No masses or atrophy  Range of motion - Flexion normal. Extension normal. Lateral rotation normal.   Palpation - nontender   Spurling's - negative       Motor Exam:    Strength: Rate on 1-5 scale Right Left    C5-Elbow flexion, Deltoid 5 5    C6-Wrist extension 5 5    C7- Elbow / finger extension 5 5    C8- Finger flexion 5 5    T1- Intrinsics hand 5 5    Sensory Exam: Reduced sensation light touch with paresthesia in right C8 versus ulnar distribution    Neurologic: Cranial Nerves II-XII are grossly intact.   Webber’s -negative bilaterally   Psychiatric: Cooperative.   Gait: Normal   Assistive Devices: " None      Imaging Studies:   No results found for this or any previous visit.      Impression & Plan:       01/08/2024: Francesca Manning is a 53 y.o. female with past medical history significant for family history breast cancer, GERD, anxiety, C5-6 ACDF with Dr. Enriquez who presents to the pain clinic for evaluation and treatment of chronic neck pain with radiation to right upper extremity and hand.  I personally reviewed and interpreted her cervical MRI dated April 18, 2023 demonstrating C5-6 ACDF; mild C5-6 right NFS; mild bilateral C6/7 NFS; patent canal.  Examination consistent with cervical radiculopathy versus ulnar neuropathy.  EMG/NCV was negative.  We discussed epidural steroid injection to improve pain.  If greater than 50% relief for at least 2-3 months can consider repeat as needed every 3 to 4 months.  I had a discussion with the patient regarding the risks of the procedure including bleeding, infection, damage to surrounding structures.  We discussed the potential adverse effects of corticosteroid injection including flushing of the face, lipodystrophy, skin discoloration, elevated blood glucose, increased blood pressure.  Risks of frequent steroid administration include weight gain, hormonal changes, mood changes, osteoporosis.  If minimal or only transient benefit from epidural can consider spinal cord stimulator trial.  3/4/2024: Symptoms well-controlled with Cymbalta and pregabalin.  Can consider WAYLON.  6/10/2024: Will plan for right paramedian WAYLON.  Can always consider SCS if minimal or transient benefit.  8/26/2024: Patient not particularly interested in injections or procedures at this time.  Will plan to contact the office if this changes or her symptoms worsen    1. Chronic cervical radiculopathy    2. Cervical radiculopathy              PLAN:  1. Medication Recommendations: Recommend Voltaren topical, NSAIDs, Tylenol.  Can trial turmeric 500 mg twice daily if NSAID contraindicated.  -Agree with  pregabalin    2. Physical Therapy: Continue HEP    3. Psychological: defer.  Could consider SCS clearance    4. Complementary and alternative (CAM) Therapies:     5. Labs/Diagnostic studies: None indicated     6. Imagin. Interventions: Can consider right paramedian cervical interlaminar epidural steroid injection (62782).  Can consider SCS trial if minimal or transient benefit to WAYLON.    8. Referrals: None indicated     9. Records:     10. Lifestyle goals:    Follow-up as needed    [facility]  Jose Jain PA-C

## 2024-10-08 ENCOUNTER — HOSPITAL ENCOUNTER (OUTPATIENT)
Dept: RADIATION ONCOLOGY | Facility: HOSPITAL | Age: 54
Setting detail: RADIATION/ONCOLOGY SERIES
Discharge: HOME | End: 2024-10-08
Payer: COMMERCIAL

## 2024-10-08 ENCOUNTER — OFFICE VISIT (OUTPATIENT)
Dept: RADIATION ONCOLOGY | Facility: HOSPITAL | Age: 54
End: 2024-10-08
Payer: COMMERCIAL

## 2024-10-08 VITALS
BODY MASS INDEX: 25.95 KG/M2 | SYSTOLIC BLOOD PRESSURE: 134 MMHG | OXYGEN SATURATION: 96 % | RESPIRATION RATE: 16 BRPM | WEIGHT: 160.7 LBS | TEMPERATURE: 98.2 F | DIASTOLIC BLOOD PRESSURE: 65 MMHG | HEART RATE: 84 BPM

## 2024-10-08 DIAGNOSIS — D05.12 DUCTAL CARCINOMA IN SITU (DCIS) OF LEFT BREAST: Primary | ICD-10-CM

## 2024-10-08 PROCEDURE — G0463 HOSPITAL OUTPT CLINIC VISIT: HCPCS

## 2024-10-08 NOTE — PROGRESS NOTES
FOLLOW UP NOTE    PATIENT:                                                      Francesca Manning  MEDICAL RECORD #:                        [id number]  :                                                          1970  COMPLETION DATE:   2024  DIAGNOSIS:    Ductal carcinoma in situ of left breast      BRIEF HISTORY:  Francesca Manning is a 53-year-old female with a strong family history of breast cancer with both her mother and father expiring from breast cancer.  The patient also had an uncle with prostate cancer.  The patient is status post genetic testing in  for BRCA1 and BRCA2 that were negative at that time.  The patient did undergo genetic testing again which did not reveal any targetable mutations but she did have a variant of unknown significance.  She had a mammogram in 2023 that was questionable an MRI scan was recommended.  MRI bilateral breasts perfomed 2023 showed findings concerning for a BI-RADS 4 lesion.  The patient had an excisional biopsy with Dr. Zaidi 10/11/2023.  This showed DCIS, ER/NJ positive, that was intermediate grade.  Margin was positive inferiorly.  The patient was taken back for re-excision 2023.  No invasive disease was identified and margins were negative.  She underwent adjuvant stereotactic radiotherapy, 30 Gray in 5 fractions, delivered to the left partial breast.  She completed on 2024.  She tolerated treatment well.     INTERVAL HISTORY:  The patient was initiated on adjuvant tamoxifen, which was poorly tolerated due to increasing arthralgias in the setting of some pre-existing joint pains.  Ultimately, the patient decided to forego adjuvant endocrine therapy.  She describes some rawness involving the left nipple which remains inverted postoperatively.  She is using a Q-tip to apply Vaseline to the nipple.  She otherwise denies skin concerns or palpable masses in either breast.  She denies left breast pain, lymphedema, or additional  specific breast complaints otherwise.    Initial repeat diagnostic bilateral mammogram performed 5/20/2024 was interpreted as BI-RADS Category 3 with postlumpectomy/postradiation changes only.  Bilateral MRI of the breasts performed 8/23/2024 was interpreted as BI-RADS Category 2.      MEDICATIONS: Medication reconciliation for the patient was reviewed and confirmed in the electronic medical record.    Review of Systems   Musculoskeletal:  Positive for arthralgias (chronic right cervical neck/RUE pain s/p prior C5-6 ACDF) and neck pain.   Neurological:  Positive for headaches.   All other systems reviewed and are negative.    KPS 90%        Physical Exam  Vitals and nursing note reviewed.   Constitutional:       General: She is not in acute distress.     Appearance: Normal appearance. She is well-developed.   HENT:      Head: Normocephalic and atraumatic.   Eyes:      Conjunctiva/sclera: Conjunctivae normal.      Pupils: Pupils are equal, round, and reactive to light.   Cardiovascular:      Rate and Rhythm: Normal rate and regular rhythm.   Pulmonary:      Effort: Pulmonary effort is normal. No respiratory distress.   Chest:      Comments: Left breast exam: Incision appears well-healed.  Skin appears healthy, intact.  Nipple appears inverted without evidence of skin adhesions, drainage, or signs of infection.  No palpable breast mass.  No lymphedema or axillary lymphadenopathy.  Musculoskeletal:         General: Normal range of motion.      Cervical back: Normal range of motion.   Skin:     General: Skin is warm and dry.   Neurological:      Mental Status: She is alert and oriented to person, place, and time.   Psychiatric:         Behavior: Behavior normal.         Thought Content: Thought content normal.         Judgment: Judgment normal.         VITAL SIGNS:   Vitals:    10/08/24 1513   BP: 134/65   Pulse: 84   Resp: 16   Temp: 98.2 °F (36.8 °C)   TempSrc: Temporal   SpO2: 96%   Weight: 72.9 kg (160 lb 11.2 oz)            IMAGING:  Narrative & Impression   BILATERAL BREAST MRI        HISTORY: 53-year-old female first breast MRI status post left breast  conservation therapy with reexcision for positive margins. Prior to  breast cancer the patient was noted to have a 22% calculated lifetime  risk for the development of breast cancer.     TECHNIQUE:  MRI was performed on a 1.5 Malinda magnet utilizing an 8  channel Sentinelle breast coil.  Pre-contrast spin-echo T1 weighted and  T2 weighted sequences were obtained in the axial plane.  Routine dynamic  images were performed following the administration of 15 ml of  Multihance contrast.  Four postcontrast runs were obtained. No contrast  complications occurred.  Delayed high resolution post contrast T1  weighted sagittal images were also obtained.  A CAD system (Courtanet) was  utilized for data analysis.       COMPARISON: Diagnostic mammography dated May 28, 2024 as well as prior  breast MRIs dated 8/22/2023 and 10/13/2022        FINDINGS: There is normal vascular enhancement and mild background  enhancement.     Left breast: Changes consistent with left breast conservation therapy  are now present. There are no areas of abnormal morphology or  enhancement in the left breast indicate left breast malignancy     Right breast: There are no areas of abnormal morphology or enhancement  in the right breast to indicate right breast malignancy     Axillary lymph nodes appear stable as visualized        IMPRESSION:  No MRI evidence of breast malignancy     RECOMMENDATIONS: Annual high risk screening breast MRI in 1 year     BI-RADS CATEGORY: 2 benign findings           This report was finalized on 8/27/2024 3:44 PM by Dr. Cassi Roper MD.            Narrative & Impression   EXAMINATION:MAMMO DIAGNOSTIC DIGITAL TOMOSYNTHESIS BILATERAL W CAD-     HISTORY: First mammogram status post left breast conservation therapy  with reexcision for negative margins. No current complaints.      TECHNIQUE: Combination 2D 3D standard views of both breasts     COMPARISON: Prior mammograms dating back to 10/20/2017     FINDINGS: There are scattered areas of fibroglandular density. Changes  consistent with left breast conservation therapy are now present. No new  suspicious masses microcalcifications or areas of architectural  distortion identified in the right or left breast.     IMPRESSION:  BI-RADS 3 probably benign findings left breast     RECOMMENDATION:  1. 6-month follow-up diagnostic left mammogram as per the lumpectomy  protocol  2. The patient will be due for annual intermediate risk screening breast  MRI in August 2024.     The standard false-negative rate of mammography is between 10% and 25%.   Complex patterns or increased breast density will markedly elevate the  false-negative rate of mammography.       A results letter, in lay terminology, will be given to the patient at  the conclusion of the exam.           ________________________________________________________________________  _  Physician Order     Diagnostic 6 Month follow up Mammogram with Breast Ultrasound if needed.     Diagnosis: Abnormal Mammogram follow-up     This report was finalized on 5/20/2024 8:28 AM by Dr. Cassi Roper MD.            The following portions of the patient's history were reviewed and updated as appropriate: allergies, current medications, past family history, past medical history, past social history, past surgical history and problem list.         Diagnoses and all orders for this visit:    1. Ductal carcinoma in situ (DCIS) of left breast (Primary)         IMPRESSION:  Francesca Manning is a 53-year-old female with history of ER/NE positive DCIS.  The patient's lesion was very small around 1 cm total.  The patient did have intermediate to high-grade and focal necrosis on her pathology.  Her final pathology was negative but she did have an initial positive margin after her first excisional biopsy, with  re-exicision demonstrating final negative margins.    She is 7 months status post completion of adjuvant 5 fraction SBRT/APBI to a total dose of 30 Gray.   She tolerated treatment well.  Clinical exam of the left breast today is benign.  Her post-treatment diagnostic mammogram of the bilateral breasts was interpreted as BI-RADS Category 3, with recommendations for 6-month diagnostic mammogram of the left breast per lumpectomy protocol, scheduled 11/25/2024.  Bilateral breast MRI was interpreted as BI-RADS Category 2, with recommendations for annual high risk screening breast MRI in 1 year, due in August 2025.  She continues to see Dr. Zaidi in the surgical oncology breast clinic for management of breast imaging.  The patient is no longer under the care of Dr. Cervantes since deciding to forego additional endocrine therapy related to poor tolerance of side effects.      The patient and I discussed the role of local breast/scar massage.  Recommend the use of topical Vitamin E.  Recommend maintaining a healthy lifestyle with a well balanced diet, calcium and vitamin D for osteoporosis prevention, and exercise as well as continue with recommended health and cancer screening guidelines.  The patient and I reviewed follow-up intervals, including biannual diagnostic mammograms to alternate between the left and bilateral breasts, annual bilateral breast MRI, biannual clinical breast exams, and monthly self breast exams.         RECOMMENDATIONS:  Continue breast imaging and routine surveillance in the surgical oncology breast clinic under the care of Dr. Zaidi.  Return to radiation oncology on an as-needed basis at this time.             Return if symptoms worsen or fail to improve, for Office Visit.    TERE Davis    I spent a total of 35 minutes on today's visit, with more than 15 minutes in direct face to face communication, and the remainder of the time spent in reviewing the relevant history, records,  available imaging, and for documentation.

## 2024-11-25 ENCOUNTER — HOSPITAL ENCOUNTER (OUTPATIENT)
Dept: MAMMOGRAPHY | Facility: HOSPITAL | Age: 54
Discharge: HOME | End: 2024-11-25
Admitting: RADIOLOGY
Payer: COMMERCIAL

## 2024-11-25 ENCOUNTER — DOCUMENTATION (OUTPATIENT)
Dept: GENETICS | Facility: HOSPITAL | Age: 54
End: 2024-11-25
Payer: COMMERCIAL

## 2024-11-25 DIAGNOSIS — R92.8 ABNORMAL MAMMOGRAM: ICD-10-CM

## 2024-11-25 LAB — NCCN CRITERIA FLAG: ABNORMAL

## 2024-11-25 PROCEDURE — 77061 BREAST TOMOSYNTHESIS UNI: CPT | Performed by: RADIOLOGY

## 2024-11-25 PROCEDURE — 77065 DX MAMMO INCL CAD UNI: CPT | Performed by: RADIOLOGY

## 2024-11-25 PROCEDURE — G0279 TOMOSYNTHESIS, MAMMO: HCPCS

## 2024-11-25 PROCEDURE — 77065 DX MAMMO INCL CAD UNI: CPT

## 2024-11-25 NOTE — PROGRESS NOTES
This patient recently took the CARE risk assessment as part of their mammogram appointment. Based on the patient's responses, NCCN criteria for genetic testing was met.     Navigator follow-up:   The patient had genetic counseling and genetic testing in January 2024.  The CancerNext Expanded panel was ordered through [employer] which analyzes BRCA1/2 and 75 additional genes associated with an increased cancer risk. 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 No additional testing is indicated at this time.

## 2024-11-25 NOTE — PROGRESS NOTES
Meets NCCN Criteria for Genetic Testing  Declines genetic testing? Y   Reason for decline? Previous Testing   If Reason for Decline is Previous Testing    [facility] CARE     Non-CARE Y   Non-CARE Confirmation    Navigator Confirmed? Y   Patient Reported?     Elevated Tyrer-Cuzick Score ? Or Equal to 20%    Declines referral?     Reason for decline?

## 2024-12-06 ENCOUNTER — TELEPHONE (OUTPATIENT)
Dept: FAMILY MEDICINE CLINIC | Facility: CLINIC | Age: 54
End: 2024-12-06

## 2024-12-06 NOTE — TELEPHONE ENCOUNTER
Caller: Francesca Manning    Relationship: Self    Best call back number: [phone]     What orders are you requesting (i.e. lab or imaging): ROUTINE FASTING LAB ORDER'S     In what timeframe would the patient need to come in: AS SOON AS POSSIBLE     Where will you receive your lab/imaging services: IN OFFICE     Additional notes:   PATIENT WOULD LIKE TO HAVE LAB DRAWN IN OFFICE BEFORE SCHEDULED APPOINTMENT 12/17/2024

## 2024-12-15 DIAGNOSIS — E55.9 VITAMIN D DEFICIENCY: ICD-10-CM

## 2024-12-15 DIAGNOSIS — R73.9 HYPERGLYCEMIA: Primary | ICD-10-CM

## 2024-12-15 DIAGNOSIS — E78.1 HYPERTRIGLYCERIDEMIA: ICD-10-CM

## 2024-12-17 ENCOUNTER — OFFICE VISIT (OUTPATIENT)
Dept: FAMILY MEDICINE CLINIC | Facility: CLINIC | Age: 54
End: 2024-12-17
Payer: COMMERCIAL

## 2024-12-17 VITALS
DIASTOLIC BLOOD PRESSURE: 86 MMHG | OXYGEN SATURATION: 96 % | HEART RATE: 96 BPM | WEIGHT: 163.3 LBS | BODY MASS INDEX: 26.24 KG/M2 | HEIGHT: 66 IN | SYSTOLIC BLOOD PRESSURE: 120 MMHG

## 2024-12-17 DIAGNOSIS — E55.9 VITAMIN D DEFICIENCY: ICD-10-CM

## 2024-12-17 DIAGNOSIS — R05.1 ACUTE COUGH: ICD-10-CM

## 2024-12-17 DIAGNOSIS — K21.9 GASTROESOPHAGEAL REFLUX DISEASE WITHOUT ESOPHAGITIS: ICD-10-CM

## 2024-12-17 DIAGNOSIS — E78.1 HYPERTRIGLYCERIDEMIA: ICD-10-CM

## 2024-12-17 DIAGNOSIS — M54.12 CHRONIC CERVICAL RADICULOPATHY: ICD-10-CM

## 2024-12-17 DIAGNOSIS — E78.2 MIXED HYPERLIPIDEMIA: ICD-10-CM

## 2024-12-17 DIAGNOSIS — Z00.00 PHYSICAL EXAM: Primary | ICD-10-CM

## 2024-12-17 DIAGNOSIS — J01.00 ACUTE NON-RECURRENT MAXILLARY SINUSITIS: ICD-10-CM

## 2024-12-17 DIAGNOSIS — E66.3 OVERWEIGHT WITH BODY MASS INDEX (BMI) 25.0-29.9: ICD-10-CM

## 2024-12-17 DIAGNOSIS — R73.03 PREDIABETES: ICD-10-CM

## 2024-12-17 DIAGNOSIS — G43.009 MIGRAINE WITHOUT AURA AND WITHOUT STATUS MIGRAINOSUS, NOT INTRACTABLE: ICD-10-CM

## 2024-12-17 RX ORDER — ATORVASTATIN CALCIUM 10 MG/1
10 TABLET, FILM COATED ORAL DAILY
Qty: 90 TABLET | Refills: 1 | Status: SHIPPED | OUTPATIENT
Start: 2024-12-17

## 2024-12-17 RX ORDER — MELOXICAM 15 MG/1
15 TABLET ORAL DAILY
COMMUNITY
End: 2024-12-17 | Stop reason: SDUPTHER

## 2024-12-17 RX ORDER — AMOXICILLIN 875 MG/1
875 TABLET, COATED ORAL 2 TIMES DAILY
Qty: 20 TABLET | Refills: 0 | Status: SHIPPED | OUTPATIENT
Start: 2024-12-17

## 2024-12-17 RX ORDER — SUMATRIPTAN SUCCINATE 25 MG/1
TABLET ORAL
Qty: 12 TABLET | Refills: 5 | Status: CANCELLED | OUTPATIENT
Start: 2024-12-17

## 2024-12-17 RX ORDER — MELOXICAM 15 MG/1
15 TABLET ORAL DAILY
Qty: 90 TABLET | Refills: 1 | Status: SHIPPED | OUTPATIENT
Start: 2024-12-17

## 2024-12-17 RX ORDER — SUMATRIPTAN SUCCINATE 25 MG/1
TABLET ORAL
Qty: 12 TABLET | Refills: 5 | Status: SHIPPED | OUTPATIENT
Start: 2024-12-17

## 2024-12-17 RX ORDER — IPRATROPIUM BROMIDE 42 UG/1
2 SPRAY, METERED NASAL 3 TIMES DAILY
COMMUNITY
Start: 2024-12-11

## 2024-12-17 RX ORDER — BENZONATATE 200 MG/1
1 CAPSULE ORAL 3 TIMES DAILY
COMMUNITY
Start: 2024-12-11

## 2024-12-17 RX ORDER — FLUCONAZOLE 150 MG/1
TABLET ORAL
Qty: 2 TABLET | Refills: 2 | Status: SHIPPED | OUTPATIENT
Start: 2024-12-17

## 2025-01-09 ENCOUNTER — TELEPHONE (OUTPATIENT)
Age: 55
End: 2025-01-09
Payer: COMMERCIAL

## 2025-01-09 DIAGNOSIS — N95.1 PERIMENOPAUSAL: Primary | ICD-10-CM

## 2025-01-09 NOTE — TELEPHONE ENCOUNTER
PT HAD TO BE CANCELLED DUE TO A PROVIDER EMERGENCY. IF PT CALLS BACK TO RESCHEDULE, PLEASE SCHEDULE WITH [facility] OR [facility]. WE ARE CURRENTLY UNABLE TO RESCHEDULE ANY PATIENTS WITH DR. BOYD, UNTIL FURTHER NOTICE, SO THEY WILL NEED TO BE RESCHEDULED WITH [facility] OR [facility]. PT HAS BEEN ADDED TO THE CANCELLATION LIST AS HIGH PRIORITY.         -HUB READY TO SCHEDULE.

## 2025-01-10 NOTE — PROGRESS NOTES
"Chief Complaint  Annual Exam (Pt is not fasting for labs.)    Subjective          Francesca Manning presents to [facility]  History of Present Illness    History of Present Illness  The patient is a 54-year-old female who presents for annual exam and evaluation of cough, sinus issues    She reports a persistent cough that has been present for approximately 2 weeks. She had a virtual consultation with [facility] last Thursday, during which she was diagnosed with sinusitis and prescribed Flonase nasal spray, Tessalon Perles, and Claritin. Despite this treatment regimen, she continues to experience significant postnasal drainage and a productive cough. The nasal discharge is described as bloody and green.     Reports elevated cholesterol and triglycerides.  She has been inconsistent in taking over-the-counter omega-3 supplements. Takes statin daily    She has not been using sumatriptan and does not require a refill at this time.     She has chronic cervical radiculopathy.  Reports in PT and continues follow up with surgeon.  She is currently on tizanidine and meloxicam, taken in the morning.     Reports history of breast cancer continues follow up with Oncology.  She has discontinued tamoxifen due to perceived side effects, including hand and foot pain. Pregabalin was not  effective for her pain. She has resumed menstruation after a 6-month hiatus.     She has received her influenza vaccine this year.     She reports no acid reflux symptoms and has discontinued Prilosec.        IMMUNIZATIONS  She received her influenza vaccine this year.      Objective   Vital Signs:   /86 (BP Location: Right arm, Patient Position: Sitting, Cuff Size: Adult)   Pulse 96   Ht 167.6 cm (65.98\")   Wt 74.1 kg (163 lb 4.8 oz)   SpO2 96%   BMI 26.37 kg/m²     Body mass index is 26.37 kg/m².    Review of Systems    Health Maintenance   Topic Date Due    Pneumococcal Vaccine 0-64 (1 of 2 - PCV) Never " done    TDAP/TD VACCINES (1 - Tdap) Never done    ZOSTER VACCINE (1 of 2) Never done    HEPATITIS C SCREENING  Never done    LIPID PANEL  2024    ANNUAL PHYSICAL  2024    COLORECTAL CANCER SCREENING  2024    COVID-19 Vaccine ( season) 2025 (Originally 2024)    INFLUENZA VACCINE  2025 (Originally 2024)    PAP SMEAR  2025    BMI FOLLOWUP  2025    MAMMOGRAM  2026        Past History:  Medical History: has a past medical history of Anxiety, Arthritis, Arthritis of neck (currently), BRCA1 negative, BRCA2 negative, Breast cancer, Cancer, Cervical arthritis (2023), Cervical disc disorder (2021), Chronic cervical radiculopathy (2023), Chronic pain disorder (From surgery 22), Condition not found, Diabetes mellitus (Gestational), Dysphagia, Frozen shoulder (approx year ago), GERD (gastroesophageal reflux disease), Gestational diabetes, Headache, tension-type, radiation therapy, Hypercholesteremia, Iron deficiency, Low back pain (neck), Migraines, Mild depression (10/17/2022), Multiple gestation, Neck pain, PONV (postoperative nausea and vomiting), Urinary tract infection, Varicella, Varicose veins of lower extremity, and Vitamin D deficiency.   Surgical History: has a past surgical history that includes Cystoscopy;  section (2010); Eye surgery;  section, low transverse (2002); Hysteroscopy w/ polypectomy (2022); Anterior cervical discectomy w/ fusion (N/A, 2022); Neck surgery (Dec 13, 2022); Spinal fusion (Dec 13, 2022); [place] tooth extraction (); Breast surgery (); Dilation and curettage of uterus; Breast biopsy; and Breast lumpectomy (Left, 2023).   Family History: family history includes Alzheimer's disease in her paternal grandfather; Anxiety disorder in her paternal aunt and paternal grandmother; Arthritis in her father, maternal aunt, and mother; Asthma in her mother; Breast  cancer (age of onset: 68) in her father; Breast cancer (age of onset: 71) in her mother; Cancer in her father, maternal aunt, maternal grandmother, and mother; Coronary artery disease in her paternal grandfather; Depression in her paternal aunt; Diabetes in her father, maternal grandmother, and paternal grandmother; GI problems in her daughter and father; Heart disease in her maternal grandfather, maternal grandmother, and paternal grandfather; Hyperlipidemia in her father; Migraines in her daughter; Miscarriages / Stillbirths in her daughter and paternal aunt; Ovarian cancer in her maternal aunt and maternal grandmother; Stroke in her paternal grandfather; Thyroid disease in her paternal grandmother; Uterine cancer in her maternal grandmother; Vision loss in her mother.   Social History: reports that she has never smoked. She has never used smokeless tobacco. She reports that she does not drink alcohol and does not use drugs.      Current Outpatient Medications:     atorvastatin (LIPITOR) 10 MG tablet, Take 1 tablet by mouth Daily. for cholesterol, Disp: 90 tablet, Rfl: 1    benzonatate (TESSALON) 200 MG capsule, Take 1 capsule by mouth 3 times a day., Disp: , Rfl:     ipratropium (ATROVENT) 0.06 % nasal spray, Administer 2 sprays into the nostril(s) as directed by provider 3 (Three) Times a Day., Disp: , Rfl:     meloxicam (MOBIC) 15 MG tablet, Take 1 tablet by mouth Daily. With food for joint pain, Disp: 90 tablet, Rfl: 1    pregabalin (LYRICA) 75 MG capsule, Take 1 capsule by mouth 2 (Two) Times a Day. (Patient taking differently: Take 50 mg by mouth 2 (Two) Times a Day. 50 MG BID), Disp: 60 capsule, Rfl: 2    SUMAtriptan (IMITREX) 25 MG tablet, Take one tablet at onset of headache. May repeat dose one time in 2 hours if headache not relieved., Disp: 12 tablet, Rfl: 5    tiZANidine (ZANAFLEX) 4 MG tablet, Take 1 tablet by mouth every night at bedtime. Caution sedation, Disp: 90 tablet, Rfl: 1    amoxicillin  (AMOXIL) 875 MG tablet, Take 1 tablet by mouth 2 (Two) Times a Day., Disp: 20 tablet, Rfl: 0    fluconazole (Diflucan) 150 MG tablet, Take 1 tab po today then repeat once antibiotic is finished, Disp: 2 tablet, Rfl: 2    OMEGA-3-ACID ETHYL ESTERS PO, Take  by mouth., Disp: , Rfl:     Allergies: Sulfa antibiotics    Physical Exam  Vitals and nursing note reviewed.   Constitutional:       General: She is not in acute distress.     Appearance: She is not ill-appearing.   HENT:      Head: Normocephalic.      Right Ear: Tympanic membrane, ear canal and external ear normal.      Left Ear: Tympanic membrane, ear canal and external ear normal.      Nose: Nose normal.      Mouth/Throat:      Mouth: Mucous membranes are moist.   Eyes:      Pupils: Pupils are equal, round, and reactive to light.   Cardiovascular:      Rate and Rhythm: Normal rate and regular rhythm.      Pulses: Normal pulses.      Heart sounds: Normal heart sounds.   Pulmonary:      Effort: Pulmonary effort is normal. No respiratory distress.      Breath sounds: Normal breath sounds.   Abdominal:      General: Bowel sounds are normal.      Palpations: Abdomen is soft.      Tenderness: There is no abdominal tenderness. There is no guarding or rebound.   Musculoskeletal:         General: Normal range of motion.      Cervical back: Normal range of motion.   Skin:     General: Skin is warm and dry.      Capillary Refill: Capillary refill takes less than 2 seconds.   Neurological:      General: No focal deficit present.      Mental Status: She is oriented to person, place, and time.   Psychiatric:         Mood and Affect: Mood normal.          Result Review :                   Assessment and Plan    Diagnoses and all orders for this visit:    1. Physical exam (Primary)  Assessment & Plan:  Discussed injury prevention, diet and exercise, safe sexual practices, and screening for common diseases. Encouraged use of sunscreen and seatbelts. Encouraged SBE, avoidance of  tobacco, limiting alcohol, and yearly dental and eye exams.         2. Migraine without aura and without status migrainosus, not intractable  -     SUMAtriptan (IMITREX) 25 MG tablet; Take one tablet at onset of headache. May repeat dose one time in 2 hours if headache not relieved.  Dispense: 12 tablet; Refill: 5    3. Chronic cervical radiculopathy  -     tiZANidine (ZANAFLEX) 4 MG tablet; Take 1 tablet by mouth every night at bedtime. Caution sedation  Dispense: 90 tablet; Refill: 1  -     meloxicam (MOBIC) 15 MG tablet; Take 1 tablet by mouth Daily. With food for joint pain  Dispense: 90 tablet; Refill: 1    4. Prediabetes    5. Vitamin D deficiency    6. Overweight with body mass index (BMI) 25.0-29.9  Assessment & Plan:  Patient's (Body mass index is 26.37 kg/m².) indicates that they are overweight with health conditions that include dyslipidemias . Weight is unchanged. BMI is above average; BMI management plan is completed. We discussed low calorie, low carb based diet program, portion control, and increasing exercise.       7. Mixed hyperlipidemia  -     atorvastatin (LIPITOR) 10 MG tablet; Take 1 tablet by mouth Daily. for cholesterol  Dispense: 90 tablet; Refill: 1    8. Acute cough    9. Acute non-recurrent maxillary sinusitis  -     amoxicillin (AMOXIL) 875 MG tablet; Take 1 tablet by mouth 2 (Two) Times a Day.  Dispense: 20 tablet; Refill: 0  -     fluconazole (Diflucan) 150 MG tablet; Take 1 tab po today then repeat once antibiotic is finished  Dispense: 2 tablet; Refill: 2    10. Hypertriglyceridemia    11. Gastroesophageal reflux disease without esophagitis        Assessment & Plan  1. Sinusitis.  Symptoms suggest a sinus infection. An antibiotic regimen will be initiated to manage the sinusitis.  Diflucan for post antibiotic yeast    2. Cholesterol management.  A prescription for atorvastatin has been renewed. She is advised to continue taking omega-3 supplements, although she has not been  consistent with them.  Continue heart healthy diet    3. Chronic pain  management.  Prescriptions for tizanidine,and meloxicam, have been renewed. She is advised to contact her pain management doctor for a refill of pregabalin.        5. Health maintenance.  She is advised to get a shingles shot from the pharmacy. Orders for blood work, including vitamin D, A1c, cholesterol, thyroid, kidney, liver, and anemia tests, have been placed. She is advised to return for fasting blood work.      Follow Up   No follow-ups on file.  Patient was given instructions and counseling regarding her condition or for health maintenance advice. Please see specific information pulled into the AVS if appropriate.     Patient or patient representative verbalized consent for the use of Ambient Listening during the visit with  Dominique Blunt PA-C for chart documentation. 1/10/2025  17:56 EST      Dominique Blunt PA-C

## 2025-01-10 NOTE — ASSESSMENT & PLAN NOTE
Patient's (Body mass index is 26.37 kg/m².) indicates that they are overweight with health conditions that include dyslipidemias . Weight is unchanged. BMI is above average; BMI management plan is completed. We discussed low calorie, low carb based diet program, portion control, and increasing exercise.

## 2025-01-17 ENCOUNTER — OFFICE VISIT (OUTPATIENT)
Dept: FAMILY MEDICINE CLINIC | Facility: CLINIC | Age: 55
End: 2025-01-17
Payer: COMMERCIAL

## 2025-01-17 VITALS
DIASTOLIC BLOOD PRESSURE: 76 MMHG | WEIGHT: 160.9 LBS | OXYGEN SATURATION: 98 % | BODY MASS INDEX: 25.86 KG/M2 | SYSTOLIC BLOOD PRESSURE: 120 MMHG | HEART RATE: 85 BPM | HEIGHT: 66 IN

## 2025-01-17 DIAGNOSIS — Z00.00 PHYSICAL EXAM: ICD-10-CM

## 2025-01-17 DIAGNOSIS — E78.1 HYPERTRIGLYCERIDEMIA: ICD-10-CM

## 2025-01-17 DIAGNOSIS — R07.89 RIGHT-SIDED CHEST WALL PAIN: ICD-10-CM

## 2025-01-17 DIAGNOSIS — R73.9 HYPERGLYCEMIA: ICD-10-CM

## 2025-01-17 DIAGNOSIS — N95.1 PERIMENOPAUSAL: ICD-10-CM

## 2025-01-17 DIAGNOSIS — E55.9 VITAMIN D DEFICIENCY: Primary | ICD-10-CM

## 2025-01-17 DIAGNOSIS — E55.9 VITAMIN D DEFICIENCY: ICD-10-CM

## 2025-01-17 DIAGNOSIS — N64.4 BREAST PAIN, LEFT: Primary | ICD-10-CM

## 2025-01-17 PROCEDURE — 96372 THER/PROPH/DIAG INJ SC/IM: CPT | Performed by: PHYSICIAN ASSISTANT

## 2025-01-17 PROCEDURE — 99214 OFFICE O/P EST MOD 30 MIN: CPT | Performed by: PHYSICIAN ASSISTANT

## 2025-01-17 RX ORDER — TRIAMCINOLONE ACETONIDE 40 MG/ML
80 INJECTION, SUSPENSION INTRA-ARTICULAR; INTRAMUSCULAR ONCE
Status: COMPLETED | OUTPATIENT
Start: 2025-01-17 | End: 2025-01-17

## 2025-01-17 RX ORDER — KETOROLAC TROMETHAMINE 30 MG/ML
60 INJECTION, SOLUTION INTRAMUSCULAR; INTRAVENOUS ONCE
Status: COMPLETED | OUTPATIENT
Start: 2025-01-17 | End: 2025-01-17

## 2025-01-17 RX ADMIN — KETOROLAC TROMETHAMINE 60 MG: 30 INJECTION, SOLUTION INTRAMUSCULAR; INTRAVENOUS at 10:14

## 2025-01-17 RX ADMIN — TRIAMCINOLONE ACETONIDE 80 MG: 40 INJECTION, SUSPENSION INTRA-ARTICULAR; INTRAMUSCULAR at 10:15

## 2025-01-17 NOTE — PROGRESS NOTES
"Chief Complaint  Breast Pain (Pt is having some pain in left breast since having a biopsy done, it has started bothering her for about a month.)    Subjective        Francesca Manning presents to [facility]  Breast Pain      History of Present Illness  The patient is a 54-year-old female who presents for evaluation of pain to her left breast.    She experienced an upper respiratory illness on 12/23/2024, characterized by a persistent cough. Despite testing negative for influenza and COVID-19 at a clinic, she was bedridden for 4 days. She continues to experience a mild cough with minimal mucus production. She was treated with an antibiotic and Tamiflu.    She underwent a biopsy of her left breast in 11/2023, followed by radiation therapy in 03/2024. She has since developed scar tissue in the area, which is hard but not painful. However, she recently noticed pain upon palpation of the area, which has slightly improved but persists. She also reports severe pain under her right arm when coughing, necessitating self-support. This symptom began approximately 2 weeks ago. She reports no associated redness. She has not taken any over-the-counter analgesics such as Tylenol or ibuprofen due to her current regimen of Lyrica and muscle relaxants. She continues to take meloxicam in the morning but missed her dose today.    MEDICATIONS  Current: Lyrica, meloxicam, Tamiflu.      Objective   Vital Signs:  /76 (BP Location: Left arm, Patient Position: Sitting, Cuff Size: Adult)   Pulse 85   Ht 167.6 cm (65.98\")   Wt 73 kg (160 lb 14.4 oz)   SpO2 98%   BMI 25.99 kg/m²   Estimated body mass index is 25.99 kg/m² as calculated from the following:    Height as of this encounter: 167.6 cm (65.98\").    Weight as of this encounter: 73 kg (160 lb 14.4 oz).            Physical Exam  Vitals and nursing note reviewed.   Constitutional:       General: She is not in acute distress.     Appearance: Normal " appearance.   HENT:      Head: Normocephalic.      Right Ear: Hearing normal.      Left Ear: Hearing normal.   Eyes:      Pupils: Pupils are equal, round, and reactive to light.   Cardiovascular:      Rate and Rhythm: Normal rate and regular rhythm.      Heart sounds: Normal heart sounds.   Pulmonary:      Effort: Pulmonary effort is normal. No respiratory distress.      Breath sounds: Normal breath sounds. No wheezing or rales.   Chest:      Chest wall: Tenderness present. No swelling or edema.      Comments: Patient is tender to periphery of left radial.  The area is hardened with likely scar tissue.  No visible erythema, edema or drainage    Patient tender upon palpation to right chest wall underneath right breast around to right mid axillary line.  No visible rash  Skin:     General: Skin is warm and dry.   Neurological:      Mental Status: She is alert.   Psychiatric:         Mood and Affect: Mood normal.        Result Review :                Assessment and Plan   Diagnoses and all orders for this visit:    1. Breast pain, left (Primary)  -     triamcinolone acetonide (KENALOG-40) injection 80 mg  -     ketorolac (TORADOL) injection 60 mg    2. Perimenopausal  -     Estradiol  -     Follicle Stimulating Hormone    3. Vitamin D deficiency  -     Vitamin D,25-Hydroxy    4. Hyperglycemia  -     Hemoglobin A1c  -     Comprehensive Metabolic Panel    5. Hypertriglyceridemia  -     Lipid Panel  -     TSH  -     CBC & Differential    6. Right-sided chest wall pain             Assessment & Plan  1. Pain under the arm.  The etiology of the pain is likely due to inflammation, potentially exacerbated a constant cough.  A therapeutic regimen will be initiated, including the administration of Kenalog and Toradol injections today. She is advised to resume her meloxicam medication tomorrow and to apply warm compresses to the affected area. The patient was counseled on the potential benefits and side effects of the treatment  plan.     2. Cough.  The cough has persisted since December 23 and may be contributing to the chest wall and breast pain. The steroid shot administered today is expected to help calm the cough. She is advised to monitor her symptoms and use warm compresses to aid in relief.    3. Left breast pain  Patient provided with Toradol and Kenalog injection this date.  Advised this is likely inflammation.  If there is no improvement in the left-sided rest pain within a 3 to 5-day period, despite amelioration of the right-sided pain, follow back up with oncology, possibly including an ultrasound, will be considered.    PROCEDURE  The patient underwent a biopsy of her left breast in 11/2023, followed by radiation therapy in 03/2024.      Follow Up   No follow-ups on file.  Patient was given instructions and counseling regarding her condition or for health maintenance advice. Please see specific information pulled into the AVS if appropriate.     Patient or patient representative verbalized consent for the use of Ambient Listening during the visit with  Dominique Blunt PA-C for chart documentation. 1/17/2025  10:41 EST

## 2025-01-18 LAB
25(OH)D3+25(OH)D2 SERPL-MCNC: 30 NG/ML (ref 30–100)
ALBUMIN SERPL-MCNC: 4.6 G/DL (ref 3.8–4.9)
ALP SERPL-CCNC: 145 IU/L (ref 44–121)
ALT SERPL-CCNC: 34 IU/L (ref 0–32)
AST SERPL-CCNC: 14 IU/L (ref 0–40)
BASOPHILS # BLD AUTO: 0 X10E3/UL (ref 0–0.2)
BASOPHILS NFR BLD AUTO: 1 %
BILIRUB SERPL-MCNC: 0.7 MG/DL (ref 0–1.2)
BUN SERPL-MCNC: 11 MG/DL (ref 6–24)
BUN/CREAT SERPL: 15 (ref 9–23)
CALCIUM SERPL-MCNC: 9.6 MG/DL (ref 8.7–10.2)
CHLORIDE SERPL-SCNC: 102 MMOL/L (ref 96–106)
CHOLEST SERPL-MCNC: 165 MG/DL (ref 100–199)
CO2 SERPL-SCNC: 22 MMOL/L (ref 20–29)
CREAT SERPL-MCNC: 0.73 MG/DL (ref 0.57–1)
EGFRCR SERPLBLD CKD-EPI 2021: 98 ML/MIN/1.73
EOSINOPHIL # BLD AUTO: 0.2 X10E3/UL (ref 0–0.4)
EOSINOPHIL NFR BLD AUTO: 3 %
ERYTHROCYTE [DISTWIDTH] IN BLOOD BY AUTOMATED COUNT: 13.2 % (ref 11.7–15.4)
ESTRADIOL SERPL-MCNC: 5.4 PG/ML
FSH SERPL-ACNC: 24.3 MIU/ML
GLOBULIN SER CALC-MCNC: 2.7 G/DL (ref 1.5–4.5)
GLUCOSE SERPL-MCNC: 84 MG/DL (ref 70–99)
HBA1C MFR BLD: 5.6 % (ref 4.8–5.6)
HCT VFR BLD AUTO: 44.1 % (ref 34–46.6)
HDLC SERPL-MCNC: 37 MG/DL
HGB BLD-MCNC: 14.1 G/DL (ref 11.1–15.9)
IMM GRANULOCYTES # BLD AUTO: 0 X10E3/UL (ref 0–0.1)
IMM GRANULOCYTES NFR BLD AUTO: 0 %
LDLC SERPL CALC-MCNC: 93 MG/DL (ref 0–99)
LH SERPL-ACNC: 14.2 MIU/ML
LYMPHOCYTES # BLD AUTO: 1.9 X10E3/UL (ref 0.7–3.1)
LYMPHOCYTES NFR BLD AUTO: 36 %
MCH RBC QN AUTO: 30.1 PG (ref 26.6–33)
MCHC RBC AUTO-ENTMCNC: 32 G/DL (ref 31.5–35.7)
MCV RBC AUTO: 94 FL (ref 79–97)
MONOCYTES # BLD AUTO: 0.5 X10E3/UL (ref 0.1–0.9)
MONOCYTES NFR BLD AUTO: 8 %
NEUTROPHILS # BLD AUTO: 2.8 X10E3/UL (ref 1.4–7)
NEUTROPHILS NFR BLD AUTO: 52 %
PLATELET # BLD AUTO: 255 X10E3/UL (ref 150–450)
POTASSIUM SERPL-SCNC: 4.5 MMOL/L (ref 3.5–5.2)
PROT SERPL-MCNC: 7.3 G/DL (ref 6–8.5)
RBC # BLD AUTO: 4.68 X10E6/UL (ref 3.77–5.28)
SODIUM SERPL-SCNC: 140 MMOL/L (ref 134–144)
TRIGL SERPL-MCNC: 205 MG/DL (ref 0–149)
TSH SERPL DL<=0.005 MIU/L-ACNC: 1.36 UIU/ML (ref 0.45–4.5)
VLDLC SERPL CALC-MCNC: 35 MG/DL (ref 5–40)
WBC # BLD AUTO: 5.4 X10E3/UL (ref 3.4–10.8)

## 2025-01-30 DIAGNOSIS — E78.1 HYPERTRIGLYCERIDEMIA: Primary | ICD-10-CM

## 2025-01-30 RX ORDER — FENOFIBRATE 160 MG/1
160 TABLET ORAL DAILY
Qty: 90 TABLET | Refills: 1 | Status: SHIPPED | OUTPATIENT
Start: 2025-01-30

## 2025-01-30 RX ORDER — EZETIMIBE 10 MG/1
10 TABLET ORAL DAILY
Qty: 90 TABLET | Refills: 1 | Status: SHIPPED | OUTPATIENT
Start: 2025-01-30

## 2025-03-13 ENCOUNTER — TRANSCRIBE ORDERS (OUTPATIENT)
Dept: ADMINISTRATIVE | Facility: HOSPITAL | Age: 55
End: 2025-03-13
Payer: COMMERCIAL

## 2025-03-13 DIAGNOSIS — D05.12 INTRADUCTAL CARCINOMA IN SITU OF LEFT BREAST: Primary | ICD-10-CM

## 2025-03-31 ENCOUNTER — OFFICE VISIT (OUTPATIENT)
Dept: PAIN MEDICINE | Facility: CLINIC | Age: 55
End: 2025-03-31
Payer: COMMERCIAL

## 2025-03-31 VITALS — HEIGHT: 66 IN | BODY MASS INDEX: 25.23 KG/M2 | WEIGHT: 157 LBS

## 2025-03-31 DIAGNOSIS — M54.12 CHRONIC CERVICAL RADICULOPATHY: Primary | ICD-10-CM

## 2025-03-31 DIAGNOSIS — Z51.81 THERAPEUTIC DRUG MONITORING: ICD-10-CM

## 2025-03-31 PROCEDURE — 99213 OFFICE O/P EST LOW 20 MIN: CPT

## 2025-03-31 RX ORDER — PREGABALIN 50 MG/1
50 CAPSULE ORAL 2 TIMES DAILY
Qty: 60 CAPSULE | Refills: 4 | Status: SHIPPED | OUTPATIENT
Start: 2025-03-31

## 2025-03-31 NOTE — PROGRESS NOTES
Referring Physician: No referring provider defined for this encounter.    Primary Physician: Dominique Blunt PA-C    CHIEF COMPLAINT or REASON FOR VISIT: Follow-up, Med Management, and Neck Pain      Initial history of present illness on 01/08/2024:  Ms. Francesca Manning is 54 y.o. female who presents as a new patient referral for evaluation treatment chronic no other radiation of the right upper extremity.  Patient has past medical history of a C5-6 ACDF with Dr. Enriquez.  Prior to that surgery she is primary complaining of right neck and shoulder pain; subsequent to the surgery patient continues to complain of right upper extremity pain however this is now underneath the armpit and into this small and ring fingers.  She additionally reports a recent onset of right rib pain associated with severe coughing secondary to RSV.  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    She primarily complains of numbness tingling and pain underneath her right armpit and into the small and ring fingers.  She also reports reduced  strength in her right hand (right-hand-dominant).  She has had an EMG/NCV with Dr. Andrews which did not demonstrate any focal nerve entrapment or radiculopathy.  She was evaluated further by Dr. Enriquez who did not identify any role for surgical intervention and referred to pain management.  Patient denies any bowel or bladder dysfunction, lower extremity weakness, new onset saddle anesthesia or unexplained weight loss.   She has tried Cymbalta which was helpful; PCP discontinued Cymbalta in order to start pregabalin which has not been helpful.  She has noticed an increase in her pain since stopping Cymbalta.      Interval history:  Patient returns to clinic today to discuss medication management for cervical radiculopathy.  She has been taking pregabalin 50 mg twice daily as prescribed by her primary care provider.   This provides her with good pain relief.  She continues to take meloxicam and tizanidine.  She continues to have chronic neck pain with radiation of the right upper extremity.  She is not interested in interventions at this time as her pain is pretty well-controlled with medication.  She denies any bowel or bladder dysfunction, clumsiness of the hands or gait.      Interventions:      Objective Pain Scoring:   BRIEF PAIN INVENTORY:  Total score:   Pain Score    03/31/25 0805   PainSc: 2    PainLoc: Neck            PHQ-2:    PHQ-9:    Opioid Risk Tool:         Review of Systems:   ROS negative except as otherwise noted     Past Medical History:   Past Medical History:   Diagnosis Date    Anxiety     Arthritis     Arthritis of neck currently    BRCA1 negative     BRCA2 negative     Breast cancer     Cancer     Cervical arthritis 07/11/2023    Cervical disc disorder Feb 2021    Chronic cervical radiculopathy 06/01/2023    Chronic pain disorder From surgery 12/13/22    Condition not found     Still birth 25 weeks.    Diabetes mellitus Gestational    Dysphagia     Secondary to cervical surgery    Frozen shoulder approx year ago    2022    GERD (gastroesophageal reflux disease)     Gestational diabetes     Headache, tension-type     Hx of radiation therapy     Hypercholesteremia     Iron deficiency     Low back pain neck    12/2021    Migraines     Mild depression 10/17/2022    Multiple gestation     Neck pain     PONV (postoperative nausea and vomiting)     Urinary tract infection     Varicella     Varicose veins of lower extremity     Vitamin D deficiency          Past Surgical History:   Past Surgical History:   Procedure Laterality Date    ANTERIOR CERVICAL DISCECTOMY W/ FUSION N/A 12/13/2022    Procedure: CERVICAL DISCECTOMY ANTERIOR WITH FUSION C5-6;  Surgeon: Giovany Enriquez MD;  Location: [facility];  Service: Neurosurgery;  Laterality: N/A;    BREAST BIOPSY      BREAST LUMPECTOMY Left 11/2023    BREAST  SURGERY       SECTION  2010    Boy: Hitesh     SECTION PRIMARY  2002    Boy: [place]    CYSTOSCOPY      X 2    DILATATION AND CURETTAGE      EYE SURGERY      RETINAL DETACHMENT X3    HYSTEROSCOPY W/ POLYPECTOMY  2022    with myosure lite, D&C    NECK SURGERY  Dec 13, 2022    SPINAL FUSION  Dec 13, 2022    C5-C6,   ACDF    WISDOM TOOTH EXTRACTION  1986         Family History   Family History   Problem Relation Age of Onset    Cancer Mother         Breast    Asthma Mother     Breast cancer Mother 71    Vision loss Mother     Arthritis Mother     Cancer Father         Breast    Arthritis Father     Breast cancer Father 68    Diabetes Father     Hyperlipidemia Father     GI problems Father     Cancer Maternal Grandmother     Diabetes Maternal Grandmother     Ovarian cancer Maternal Grandmother     Uterine cancer Maternal Grandmother     Heart disease Maternal Grandmother     Heart disease Maternal Grandfather     Diabetes Paternal Grandmother     Anxiety disorder Paternal Grandmother     Thyroid disease Paternal Grandmother     Coronary artery disease Paternal Grandfather     Stroke Paternal Grandfather     Heart disease Paternal Grandfather     Alzheimer's disease Paternal Grandfather     Miscarriages / Stillbirths Daughter     GI problems Daughter     Migraines Daughter     Arthritis Maternal Aunt     Cancer Maternal Aunt     Ovarian cancer Maternal Aunt     Anxiety disorder Paternal Aunt     Depression Paternal Aunt     Miscarriages / Stillbirths Paternal Aunt     Anxiety disorder Paternal Aunt     Depression Paternal Aunt     Miscarriages / Stillbirths Paternal Aunt     Arthritis Maternal Aunt     Cancer Maternal Aunt     Miscarriages / Stillbirths Daughter     Colon cancer Neg Hx          Social History   Social History     Socioeconomic History    Marital status:     Number of children: 2   Tobacco Use    Smoking status: Never    Smokeless tobacco: Never   Vaping Use     "Vaping status: Never Used   Substance and Sexual Activity    Alcohol use: Never    Drug use: Never    Sexual activity: Yes     Partners: Male     Birth control/protection: Vasectomy        Medications:     Current Outpatient Medications:     atorvastatin (LIPITOR) 10 MG tablet, Take 1 tablet by mouth Daily. for cholesterol, Disp: 90 tablet, Rfl: 1    meloxicam (MOBIC) 15 MG tablet, Take 1 tablet by mouth Daily. With food for joint pain, Disp: 90 tablet, Rfl: 1    pregabalin (LYRICA) 75 MG capsule, Take 1 capsule by mouth 2 (Two) Times a Day. (Patient taking differently: Take 50 mg by mouth 2 (Two) Times a Day. 50 MG BID), Disp: 60 capsule, Rfl: 2    SUMAtriptan (IMITREX) 25 MG tablet, Take one tablet at onset of headache. May repeat dose one time in 2 hours if headache not relieved., Disp: 12 tablet, Rfl: 5    tiZANidine (ZANAFLEX) 4 MG tablet, Take 1 tablet by mouth every night at bedtime. Caution sedation, Disp: 90 tablet, Rfl: 1        Physical Exam:     Vitals:    03/31/25 0805   Weight: 71.2 kg (157 lb)   Height: 167.6 cm (65.98\")   PainSc: 2    PainLoc: Neck              General: Alert and oriented, No acute distress.   HEENT: Normocephalic, atraumatic.   Cardiovascular: No gross edema  Respiratory: Respirations are non-labored    Cervical Spine:   No masses or atrophy  Range of motion - Flexion normal. Extension normal. Lateral rotation normal.   Palpation - nontender   Spurling's - negative       Motor Exam:    Strength: Rate on 1-5 scale Right Left    C5-Elbow flexion, Deltoid 5 5    C6-Wrist extension 5 5    C7- Elbow / finger extension 5 5    C8- Finger flexion 5 5    T1- Intrinsics hand 5 5    Sensory Exam: Reduced sensation light touch with paresthesia in right C8 versus ulnar distribution    Neurologic: Cranial Nerves II-XII are grossly intact.   Webber’s -negative bilaterally   Psychiatric: Cooperative.   Gait: Normal   Assistive Devices: None      Imaging Studies:   No results found for this or any " previous visit.      Impression & Plan:       01/08/2024: Francesca Manning is a 54 y.o. female with past medical history significant for family history breast cancer, GERD, anxiety, C5-6 ACDF with Dr. Enriquez who presents to the pain clinic for evaluation and treatment of chronic neck pain with radiation to right upper extremity and hand.  I personally reviewed and interpreted her cervical MRI dated April 18, 2023 demonstrating C5-6 ACDF; mild C5-6 right NFS; mild bilateral C6/7 NFS; patent canal.  Examination consistent with cervical radiculopathy versus ulnar neuropathy.  EMG/NCV was negative.  We discussed epidural steroid injection to improve pain.  If greater than 50% relief for at least 2-3 months can consider repeat as needed every 3 to 4 months.  I had a discussion with the patient regarding the risks of the procedure including bleeding, infection, damage to surrounding structures.  We discussed the potential adverse effects of corticosteroid injection including flushing of the face, lipodystrophy, skin discoloration, elevated blood glucose, increased blood pressure.  Risks of frequent steroid administration include weight gain, hormonal changes, mood changes, osteoporosis.  If minimal or only transient benefit from epidural can consider spinal cord stimulator trial.  3/4/2024: Symptoms well-controlled with Cymbalta and pregabalin.  Can consider WAYLON.  6/10/2024: Will plan for right paramedian WAYLON.  Can always consider SCS if minimal or transient benefit.  8/26/2024: Patient not particularly interested in injections or procedures at this time.  Will plan to contact the office if this changes or her symptoms worsen  3/31/2025: Will assume responsibility of pregabalin from PCP.  Symptoms well-controlled with medication.    1. Chronic cervical radiculopathy    2. Therapeutic drug monitoring                PLAN:  1. Medication Recommendations: Recommend Voltaren topical, NSAIDs, Tylenol.  Can trial turmeric 500 mg  twice daily if NSAID contraindicated.  -Pregabalin 50 mg twice daily, 60 pills, #4 refills  As part of this patient's treatment plan, patient will be prescribed controlled substances.  The patient has been made aware of appropriate use of such medications, including potential risk of somnolent, limited ability to drive and/or work safely, and potential for dependence or overdose.  He has been made clear that his medications refused by this patient only, without concomitant use of alcohol or other substances as prescribed.  Controlled substance status of medication discussed with patient, discussed risk of medication including abuse potential and diversion potential and need to follow-up for reevaluation appointment in order to receive further refills.  Greg was reviewed and compliant.     2. Physical Therapy: Continue HEP    3. Psychological: defer.      4. Complementary and alternative (CAM) Therapies:     5. Labs/Diagnostic studies: Obtain compliance UDS    6. Imagin. Interventions: Can consider right paramedian cervical interlaminar epidural steroid injection (28816).    8. Referrals: None indicated     9. Records:     10. Lifestyle goals:    Follow-up 5 months in [place] for medication management    [facility]  Jose Jain PA-C

## 2025-03-31 NOTE — TELEPHONE ENCOUNTER
Assume responsibility of pregabalin  Pregabalin 50 mg twice daily, 60 pills, #4 refills  Greg reviewed and compliant  Controlled substance agreement signed in office  Pending UDS  Appropriate follow-up visit scheduled

## 2025-04-18 ENCOUNTER — LAB (OUTPATIENT)
Dept: FAMILY MEDICINE CLINIC | Facility: CLINIC | Age: 55
End: 2025-04-18
Payer: COMMERCIAL

## 2025-04-18 DIAGNOSIS — E78.1 HYPERTRIGLYCERIDEMIA: Primary | ICD-10-CM

## 2025-04-18 DIAGNOSIS — E78.1 HYPERTRIGLYCERIDEMIA: ICD-10-CM

## 2025-04-19 LAB
ALBUMIN SERPL-MCNC: 4.6 G/DL (ref 3.8–4.9)
ALP SERPL-CCNC: 126 IU/L (ref 44–121)
ALT SERPL-CCNC: 21 IU/L (ref 0–32)
AST SERPL-CCNC: 9 IU/L (ref 0–40)
BILIRUB SERPL-MCNC: 0.9 MG/DL (ref 0–1.2)
BUN SERPL-MCNC: 12 MG/DL (ref 6–24)
BUN/CREAT SERPL: 15 (ref 9–23)
CALCIUM SERPL-MCNC: 9.4 MG/DL (ref 8.7–10.2)
CHLORIDE SERPL-SCNC: 100 MMOL/L (ref 96–106)
CHOLEST SERPL-MCNC: 209 MG/DL (ref 100–199)
CO2 SERPL-SCNC: 21 MMOL/L (ref 20–29)
CREAT SERPL-MCNC: 0.78 MG/DL (ref 0.57–1)
EGFRCR SERPLBLD CKD-EPI 2021: 90 ML/MIN/1.73
GLOBULIN SER CALC-MCNC: 2.4 G/DL (ref 1.5–4.5)
GLUCOSE SERPL-MCNC: 83 MG/DL (ref 70–99)
HDLC SERPL-MCNC: 51 MG/DL
LDLC SERPL CALC-MCNC: 140 MG/DL (ref 0–99)
POTASSIUM SERPL-SCNC: 4.1 MMOL/L (ref 3.5–5.2)
PROT SERPL-MCNC: 7 G/DL (ref 6–8.5)
SODIUM SERPL-SCNC: 138 MMOL/L (ref 134–144)
TRIGL SERPL-MCNC: 98 MG/DL (ref 0–149)
VLDLC SERPL CALC-MCNC: 18 MG/DL (ref 5–40)

## 2025-04-22 ENCOUNTER — TELEPHONE (OUTPATIENT)
Dept: FAMILY MEDICINE CLINIC | Facility: CLINIC | Age: 55
End: 2025-04-22

## 2025-04-22 NOTE — TELEPHONE ENCOUNTER
Please call [facility] and ask if they can add on a Ha1c to patient's labs drawn on 4/18, DX: hyperglycemia R73.9  thanks

## 2025-04-24 ENCOUNTER — TELEMEDICINE (OUTPATIENT)
Dept: FAMILY MEDICINE CLINIC | Facility: CLINIC | Age: 55
End: 2025-04-24
Payer: COMMERCIAL

## 2025-04-24 VITALS — BODY MASS INDEX: 25.23 KG/M2 | WEIGHT: 157 LBS | HEIGHT: 66 IN

## 2025-04-24 DIAGNOSIS — E78.2 MIXED HYPERLIPIDEMIA: ICD-10-CM

## 2025-04-24 DIAGNOSIS — F32.A MILD DEPRESSION: ICD-10-CM

## 2025-04-24 DIAGNOSIS — R73.9 HYPERGLYCEMIA: Primary | ICD-10-CM

## 2025-04-24 PROCEDURE — 99214 OFFICE O/P EST MOD 30 MIN: CPT | Performed by: PHYSICIAN ASSISTANT

## 2025-04-24 RX ORDER — EZETIMIBE 10 MG/1
10 TABLET ORAL DAILY
Qty: 90 TABLET | Refills: 1 | Status: SHIPPED | OUTPATIENT
Start: 2025-04-24

## 2025-04-24 RX ORDER — BUPROPION HYDROCHLORIDE 150 MG/1
150 TABLET ORAL DAILY
Qty: 90 TABLET | Refills: 1 | Status: SHIPPED | OUTPATIENT
Start: 2025-04-24

## 2025-04-24 NOTE — ASSESSMENT & PLAN NOTE
Patient's depression is a recurrent episode that is mild without psychosis. Depression is active and newly identified.    Plan:   Continue current medication therapy   Will restart Wellbutrin.    Followup in 4 weeks.

## 2025-04-24 NOTE — PROGRESS NOTES
"Chief Complaint  Stress (Pt would like to discuss being stressed. Pt states she has been dealing with symptoms for 2 weeks and states it's been major stress.) and Hyperlipidemia    Subjective           Francesca Manning presents to [facility]  History of Present Illness  Patient reports today secondary to wanting to restart Wellbutrin.  Patient reports taking it in the past with elevated stress and anxiety.  States she took 150 mg and would like to restart.  Patient states she has been taking an old prescription for the past few weeks and states she already feels better.    Patient reports having her cholesterol checked recently states her overall cholesterol and LDL were elevated.  States she has been taking over-the-counter fish oil to help with triglycerides.    Patient reports needing an order for A1c secondary to prediabetes.  Hyperlipidemia      Objective   Vital Signs:   Ht 167.6 cm (65.98\")   Wt 71.2 kg (157 lb)   BMI 25.36 kg/m²     Estimated body mass index is 25.36 kg/m² as calculated from the following:    Height as of this encounter: 167.6 cm (65.98\").    Weight as of this encounter: 71.2 kg (157 lb).     Physical Exam   Pulmonary/Chest: Effort normal.   Psychiatric: She has a normal mood and affect.     Result Review :                   Assessment and Plan      Diagnoses and all orders for this visit:    1. Hyperglycemia (Primary)  Assessment & Plan:  HA1c ordered    Orders:  -     Hemoglobin A1c; Future    2. Mild depression  Assessment & Plan:  Patient's depression is a recurrent episode that is mild without psychosis. Depression is active and newly identified.    Plan:   Continue current medication therapy   Will restart Wellbutrin.    Followup in 4 weeks.     Orders:  -     buPROPion XL (Wellbutrin XL) 150 MG 24 hr tablet; Take 1 tablet by mouth Daily.  Dispense: 90 tablet; Refill: 1    3. Mixed hyperlipidemia  Assessment & Plan:   Per patient request we will " discontinue statin.  Prescribe Zetia this date.  Will recheck in 3 to 4 months.  Patient will continue heart healthy diet and exercise    Orders:  -     ezetimibe (Zetia) 10 MG tablet; Take 1 tablet by mouth Daily. For cholesterol  Dispense: 90 tablet; Refill: 1        Follow Up     No follow-ups on file.  Patient was given instructions and counseling regarding her condition or for health maintenance advice. Please see specific information pulled into the AVS if appropriate.     Mode of Visit: Video  Location of patient: other: Work  Location of provider: [facility] [place] [facility]  You have chosen to receive care through a telehealth visit.  Does the patient consent to use a video/audio connection for your medical care today? Yes  The visit included audio and video interaction. No technical issues occurred during this visit.

## 2025-04-24 NOTE — ASSESSMENT & PLAN NOTE
Per patient request we will discontinue statin.  Prescribe Zetia this date.  Will recheck in 3 to 4 months.  Patient will continue heart healthy diet and exercise

## 2025-05-05 NOTE — ASSESSMENT & PLAN NOTE
Sx started approx two years ago with radicular sx in RUE down to hand with intermittent numbness.  H/o Cervical fusion 12/22- C5-6 Dr. Enriquez. initial resolution of RUE pain however gradually has came back and radiates to right hand 4th and 5th digits. Denies numbness.  Hair loss with Topamax and Lyrica ( helped her pain). NOTE hair loss continues which suggest it may not be related.  Has seen Dr. Smith in [place], Pre and postop EMG/NCV negative.   She does have radicular pain down her shoulder and her 4th and 5th right fingers.  She will trial Tylenol arthritis as she is travelling more with her son.    Plan:  Tylenol Arthritis 2 twice daily as needed- [employer] generic or brand.  Continue meloxicam 15mg per day with food   She was referred here for eval of systemic CTD. We cannot make that diagnosis.  Recheck labs today  On Lyrica  No longer on Cymbalta  Heat, ice, massage,Biofreeze.  She is in PT 3 times a week  She has tried holistic therapies  She is now seeing a dietician and is walking a mile a day.  She has seen dentist in [place] and had 3 D extra.  She has seen a specialist in [place] who is a chiropractor by [place].  She has tried accupuncture

## 2025-05-05 NOTE — ASSESSMENT & PLAN NOTE
1:160 speckled.  Other serologies negative.    Plan:  NO CURRENT SIGNS/SX CTD.  SPF 50 +  If she develops sx that we discussed she can return to clinic sooner otherwise I will see her annually.   Recheck today

## 2025-05-08 ENCOUNTER — OFFICE VISIT (OUTPATIENT)
Age: 55
End: 2025-05-08
Payer: COMMERCIAL

## 2025-05-08 VITALS
HEIGHT: 66 IN | BODY MASS INDEX: 24.91 KG/M2 | WEIGHT: 155 LBS | HEART RATE: 92 BPM | TEMPERATURE: 97.6 F | SYSTOLIC BLOOD PRESSURE: 118 MMHG | DIASTOLIC BLOOD PRESSURE: 72 MMHG

## 2025-05-08 DIAGNOSIS — M50.30 DDD (DEGENERATIVE DISC DISEASE), CERVICAL: ICD-10-CM

## 2025-05-08 DIAGNOSIS — R76.8 POSITIVE ANA (ANTINUCLEAR ANTIBODY): Primary | ICD-10-CM

## 2025-05-08 PROCEDURE — 99213 OFFICE O/P EST LOW 20 MIN: CPT | Performed by: NURSE PRACTITIONER

## 2025-05-08 NOTE — PROGRESS NOTES
Office Visit       Date: 05/08/2025   Patient Name: Francesca Manning  MRN: [id number]  YOB: 1970    Referring Physician: No ref. provider found     Chief Complaint:   Chief Complaint   Patient presents with    Follow-up    positive SHELIA       History of Present Illness: Francesca Manning is a 54 y.o. female who is here today for follow-up of positive SHELIA.  Today she reports feeling the same.  She rates her pain 2 out of 10, global 5 out of 10 with 15 minutes of morning stiffness.      Subjective   Review of Systems:  Review of Systems   Musculoskeletal:  Positive for neck pain.   Neurological:  Positive for numbness.   All other systems reviewed and are negative.       Past Medical History:   Past Medical History:   Diagnosis Date    Anxiety     Arthritis     Arthritis of neck currently    BRCA1 negative     BRCA2 negative     Breast cancer     Cancer     Cervical arthritis 07/11/2023    Cervical disc disorder Feb 2021    Chronic cervical radiculopathy 06/01/2023    Chronic pain disorder From surgery 12/13/22    Condition not found     Still birth 25 weeks.    Diabetes mellitus Gestational    Dysphagia     Secondary to cervical surgery    Frozen shoulder approx year ago    2022    GERD (gastroesophageal reflux disease)     Gestational diabetes     Headache, tension-type     Hx of radiation therapy     Hypercholesteremia     Iron deficiency     Low back pain neck    12/2021    Migraines     Mild depression 10/17/2022    Multiple gestation     Neck pain     PONV (postoperative nausea and vomiting)     Urinary tract infection     Varicella     Varicose veins of lower extremity     Vitamin D deficiency        Past Surgical History:   Past Surgical History:   Procedure Laterality Date    ANTERIOR CERVICAL DISCECTOMY W/ FUSION N/A 12/13/2022    Procedure: CERVICAL DISCECTOMY ANTERIOR WITH FUSION C5-6;  Surgeon: Giovany Enriquez MD;  Location: [facility];  Service: Neurosurgery;   Laterality: N/A;    BREAST BIOPSY      BREAST LUMPECTOMY Left 2023    BREAST SURGERY       SECTION  2010    Boy: Hitesh     SECTION PRIMARY  2002    Boy: [place]    CYSTOSCOPY      X 2    DILATATION AND CURETTAGE      EYE SURGERY      RETINAL DETACHMENT X3    HYSTEROSCOPY W/ POLYPECTOMY  2022    with myosure lite, D&C    NECK SURGERY  Dec 13, 2022    SPINAL FUSION  Dec 13, 2022    C5-C6,   ACDF    WISDOM TOOTH EXTRACTION         Family History:   Family History   Problem Relation Age of Onset    Cancer Mother         Breast    Asthma Mother     Breast cancer Mother 71    Vision loss Mother     Arthritis Mother     Cancer Father         Breast    Arthritis Father     Breast cancer Father 68    Diabetes Father     Hyperlipidemia Father     GI problems Father     Cancer Maternal Grandmother     Diabetes Maternal Grandmother     Ovarian cancer Maternal Grandmother     Uterine cancer Maternal Grandmother     Heart disease Maternal Grandmother     Heart disease Maternal Grandfather     Diabetes Paternal Grandmother     Anxiety disorder Paternal Grandmother     Thyroid disease Paternal Grandmother     Coronary artery disease Paternal Grandfather     Stroke Paternal Grandfather     Heart disease Paternal Grandfather     Alzheimer's disease Paternal Grandfather     Miscarriages / Stillbirths Daughter     GI problems Daughter     Migraines Daughter     Arthritis Maternal Aunt     Cancer Maternal Aunt     Ovarian cancer Maternal Aunt     Anxiety disorder Paternal Aunt     Depression Paternal Aunt     Miscarriages / Stillbirths Paternal Aunt     Anxiety disorder Paternal Aunt     Depression Paternal Aunt     Miscarriages / Stillbirths Paternal Aunt     Arthritis Maternal Aunt     Cancer Maternal Aunt     Miscarriages / Stillbirths Daughter     Colon cancer Neg Hx        Social History:   Social History     Socioeconomic History    Marital status:     Number of children: 2  "  Tobacco Use    Smoking status: Never     Passive exposure: Past    Smokeless tobacco: Never   Vaping Use    Vaping status: Never Used   Substance and Sexual Activity    Alcohol use: Never    Drug use: Never    Sexual activity: Yes     Partners: Male     Birth control/protection: Vasectomy       Medications:   Current Outpatient Medications:     buPROPion XL (Wellbutrin XL) 150 MG 24 hr tablet, Take 1 tablet by mouth Daily., Disp: 90 tablet, Rfl: 1    ezetimibe (Zetia) 10 MG tablet, Take 1 tablet by mouth Daily. For cholesterol, Disp: 90 tablet, Rfl: 1    meloxicam (MOBIC) 15 MG tablet, Take 1 tablet by mouth Daily. With food for joint pain, Disp: 90 tablet, Rfl: 1    pregabalin (LYRICA) 50 MG capsule, Take 1 capsule by mouth 2 (Two) Times a Day., Disp: 60 capsule, Rfl: 4    SUMAtriptan (IMITREX) 25 MG tablet, Take one tablet at onset of headache. May repeat dose one time in 2 hours if headache not relieved., Disp: 12 tablet, Rfl: 5    tiZANidine (ZANAFLEX) 4 MG tablet, Take 1 tablet by mouth every night at bedtime. Caution sedation, Disp: 90 tablet, Rfl: 1    Allergies:   Allergies   Allergen Reactions    Sulfa Antibiotics Rash       I have reviewed and updated the patient's chief complaint, history of present illness, review of systems, past medical history, surgical history, family history, social history, medications and allergy list as appropriate.     Objective    Vital Signs:   Vitals:    05/08/25 0823   BP: 118/72   BP Location: Left arm   Patient Position: Sitting   Cuff Size: Adult   Pulse: 92   Temp: 97.6 °F (36.4 °C)   Weight: 70.3 kg (155 lb)   Height: 167.6 cm (65.98\")   PainSc: 2    PainLoc: Generalized     Body mass index is 25.03 kg/m².           Physical Exam:  Physical Exam  Vitals reviewed.   Constitutional:       Appearance: Normal appearance.   HENT:      Head: Normocephalic and atraumatic.      Mouth/Throat:      Mouth: Mucous membranes are moist.   Eyes:      Conjunctiva/sclera: Conjunctivae " normal.   Cardiovascular:      Rate and Rhythm: Normal rate and regular rhythm.      Pulses: Normal pulses.      Heart sounds: Normal heart sounds.   Pulmonary:      Effort: Pulmonary effort is normal.      Breath sounds: Normal breath sounds.   Musculoskeletal:         General: Normal range of motion.      Cervical back: Normal range of motion and neck supple.      Comments: Decreased ROM right shoulder  [place] bilaterally  No synovitis  Crepitus bilateral knees  Tender right shoulder to palpation   Skin:     General: Skin is warm and dry.   Neurological:      General: No focal deficit present.      Mental Status: She is alert and oriented to person, place, and time. Mental status is at baseline.   Psychiatric:         Mood and Affect: Mood normal.         Behavior: Behavior normal.         Thought Content: Thought content normal.         Judgment: Judgment normal.          Results Review:   Imaging Results (Last 24 Hours)       ** No results found for the last 24 hours. **            Procedures    Assessment / Plan    Assessment/Plan:   Diagnoses and all orders for this visit:    1. Positive SHELIA (antinuclear antibody) (Primary)  Assessment & Plan:  1:160 speckled.  Other serologies negative.    Plan:  NO CURRENT SIGNS/SX CTD.  SPF 50 +  If she develops sx that we discussed she can return to clinic sooner otherwise I will see her annually.   Recheck today    Orders:  -     SHELIA by IFA, Reflex to Titer and Pattern; Future  -     SHELIA Comprehensive Panel; Future    2. DDD (degenerative disc disease), cervical  Assessment & Plan:  Sx started approx two years ago with radicular sx in RUE down to hand with intermittent numbness.  H/o Cervical fusion 12/22- C5-6 Dr. Enriquez. initial resolution of RUE pain however gradually has came back and radiates to right hand 4th and 5th digits. Denies numbness.  Hair loss with Topamax and Lyrica ( helped her pain). NOTE hair loss continues which suggest it may not be related.  Has  seen Dr. Smith in [place], Pre and postop EMG/NCV negative.   She does have radicular pain down her shoulder and her 4th and 5th right fingers.  She will trial Tylenol arthritis as she is travelling more with her son.    Plan:  Tylenol Arthritis 2 twice daily as needed- [employer] generic or brand.  Continue meloxicam 15mg per day with food   She was referred here for eval of systemic CTD. We cannot make that diagnosis.  Recheck labs today  On Lyrica  No longer on Cymbalta  Heat, ice, massage,Biofreeze.  She is in PT 3 times a week  She has tried holistic therapies  She is now seeing a dietician and is walking a mile a day.  She has seen dentist in [place] and had 3 D extra.  She has seen a specialist in [place] who is a chiropractor by "[employer].  She has tried accupuncture              Follow Up:   Return in about 1 year (around 5/8/2026) for NP.        TERE Ram  [facility]

## 2025-05-24 LAB — HBA1C MFR BLD: 5.4 % (ref 4.8–5.6)

## 2025-05-27 ENCOUNTER — HOSPITAL ENCOUNTER (OUTPATIENT)
Dept: MAMMOGRAPHY | Facility: HOSPITAL | Age: 55
Discharge: HOME | End: 2025-05-27
Admitting: SURGERY
Payer: COMMERCIAL

## 2025-05-27 DIAGNOSIS — D05.12 INTRADUCTAL CARCINOMA IN SITU OF LEFT BREAST: ICD-10-CM

## 2025-05-27 PROCEDURE — 77062 BREAST TOMOSYNTHESIS BI: CPT | Performed by: RADIOLOGY

## 2025-05-27 PROCEDURE — G0279 TOMOSYNTHESIS, MAMMO: HCPCS

## 2025-05-27 PROCEDURE — 77066 DX MAMMO INCL CAD BI: CPT | Performed by: RADIOLOGY

## 2025-05-27 PROCEDURE — 77066 DX MAMMO INCL CAD BI: CPT

## 2025-05-29 ENCOUNTER — OFFICE VISIT (OUTPATIENT)
Dept: FAMILY MEDICINE CLINIC | Facility: CLINIC | Age: 55
End: 2025-05-29
Payer: COMMERCIAL

## 2025-05-29 VITALS
OXYGEN SATURATION: 97 % | SYSTOLIC BLOOD PRESSURE: 116 MMHG | WEIGHT: 153 LBS | DIASTOLIC BLOOD PRESSURE: 70 MMHG | HEART RATE: 87 BPM | BODY MASS INDEX: 24.59 KG/M2 | HEIGHT: 66 IN

## 2025-05-29 DIAGNOSIS — M54.12 CHRONIC CERVICAL RADICULOPATHY: Primary | ICD-10-CM

## 2025-05-29 DIAGNOSIS — J02.9 SORE THROAT: ICD-10-CM

## 2025-05-29 PROCEDURE — 99214 OFFICE O/P EST MOD 30 MIN: CPT | Performed by: PHYSICIAN ASSISTANT

## 2025-05-29 RX ORDER — ATORVASTATIN CALCIUM 10 MG/1
10 TABLET, FILM COATED ORAL DAILY
COMMUNITY

## 2025-05-29 RX ORDER — BENZONATATE 200 MG/1
1 CAPSULE ORAL 3 TIMES DAILY
COMMUNITY
Start: 2025-05-17 | End: 2025-05-29

## 2025-05-29 RX ORDER — CHLORCYCLIZINE HYDROCHLORIDE AND PSEUDOEPHEDRINE HYDROCHLORIDE 25; 60 MG/1; MG/1
1 TABLET ORAL 3 TIMES DAILY
COMMUNITY
Start: 2025-05-17 | End: 2025-05-29

## 2025-05-29 NOTE — PROGRESS NOTES
"Chief Complaint  Neck Pain (Reports pain has not improved would like MRI)    Subjective        Francesca Manning presents to [facility]  Neck Pain         History of Present Illness  The patient is a 54-year-old female presenting with chronic neck pain.     Post cervical surgery on 12/13/2022, she reports she continues to have neck pain and pain down her arm.  Reports she continues  physical therapy 2-3 times weekly. Continues Lyrica.  Seeking updated imaging.     Reports throat pain for past 10-14 days.  Reports going to [facility] and advised throat pain secondary to post nasal drainage. Has been taking stahist, symptoms improved however have returned.  Improved voice but sore throat last night, continues to cough with phlegm relief. Reports she consumes at least 64 ounces daily.       Objective   Vital Signs:  /70 (BP Location: Right arm, Patient Position: Sitting, Cuff Size: Adult)   Pulse 87   Ht 167.6 cm (65.98\")   Wt 69.4 kg (153 lb)   SpO2 97%   BMI 24.71 kg/m²   Estimated body mass index is 24.71 kg/m² as calculated from the following:    Height as of this encounter: 167.6 cm (65.98\").    Weight as of this encounter: 69.4 kg (153 lb).    BMI is within normal parameters. No other follow-up for BMI required.      Physical Exam  Vitals and nursing note reviewed.   Constitutional:       General: She is not in acute distress.     Appearance: Normal appearance.   HENT:      Head: Normocephalic.      Right Ear: Hearing normal.      Left Ear: Hearing normal.      Mouth/Throat:      Pharynx: Posterior oropharyngeal erythema present.      Comments: Post nasal drainage  Eyes:      Pupils: Pupils are equal, round, and reactive to light.   Cardiovascular:      Rate and Rhythm: Normal rate and regular rhythm.      Heart sounds: Normal heart sounds.   Pulmonary:      Effort: Pulmonary effort is normal. No respiratory distress.      Breath sounds: Normal breath sounds.   Skin:     " General: Skin is warm and dry.   Neurological:      Mental Status: She is alert.   Psychiatric:         Mood and Affect: Mood normal.        Result Review :                Assessment and Plan   Diagnoses and all orders for this visit:    1. Chronic cervical radiculopathy (Primary)  -     XR Spine Cervical 2 or 3 View; Future  -     MRI Cervical Spine With & Without Contrast; Future    2. Sore throat             Assessment & Plan  1. Chronic cervicalgia with radiculopathy.  - Worsening symptoms since surgery on 12/13/2022, despite physical therapy.  - Ordered x-ray and MRI with and without contrast of cervical spine;     2. Sore throat.  - likely secondary to post nasal drainage  - Continue Claritin or Allegra.  - Recommended Flonase nasal spray daily.  - Ensure adequate hydration, at least 64 ounces daily.          Follow Up   No follow-ups on file.  Patient was given instructions and counseling regarding her condition or for health maintenance advice. Please see specific information pulled into the AVS if appropriate.     Patient or patient representative verbalized consent for the use of Ambient Listening during the visit with  Dominique Blunt PA-C for chart documentation. 5/29/2025  11:40 EDT

## 2025-05-30 DIAGNOSIS — R73.9 HYPERGLYCEMIA: Primary | ICD-10-CM

## 2025-08-06 DIAGNOSIS — M54.12 CHRONIC CERVICAL RADICULOPATHY: ICD-10-CM

## 2025-08-07 ENCOUNTER — TELEPHONE (OUTPATIENT)
Dept: PAIN MEDICINE | Facility: CLINIC | Age: 55
End: 2025-08-07
Payer: COMMERCIAL

## 2025-08-22 ENCOUNTER — OFFICE VISIT (OUTPATIENT)
Dept: PAIN MEDICINE | Facility: CLINIC | Age: 55
End: 2025-08-22
Payer: COMMERCIAL

## 2025-08-22 VITALS — HEIGHT: 66 IN | WEIGHT: 158 LBS | BODY MASS INDEX: 25.39 KG/M2

## 2025-08-22 DIAGNOSIS — M54.12 CHRONIC CERVICAL RADICULOPATHY: Primary | ICD-10-CM

## 2025-08-22 DIAGNOSIS — M54.12 CERVICAL RADICULOPATHY: ICD-10-CM

## 2025-08-22 DIAGNOSIS — M47.812 CERVICAL SPONDYLOSIS WITHOUT MYELOPATHY: ICD-10-CM

## 2025-08-22 DIAGNOSIS — Z51.81 THERAPEUTIC DRUG MONITORING: ICD-10-CM

## 2025-08-22 RX ORDER — PREGABALIN 75 MG/1
75 CAPSULE ORAL 2 TIMES DAILY
Qty: 60 CAPSULE | Refills: 4 | Status: SHIPPED | OUTPATIENT
Start: 2025-08-22

## (undated) DEVICE — SUT SILK 2/0 TIES 18IN A185H

## (undated) DEVICE — SOL ISO/ALC RUB 70PCT 4OZ

## (undated) DEVICE — GLOW 'N TELL 30CM TAPE (20 STRIPS): Brand: VASCUTAPE RADIOPAQUE TAPE

## (undated) DEVICE — ELECTRD BLD EZ CLN MOD XLNG 2.75IN

## (undated) DEVICE — PENCL ROCKRSWCH MEGADYNE W/HOLSTR 10FT SS

## (undated) DEVICE — NEEDLE, QUINCKE, 20GX3.5": Brand: MEDLINE

## (undated) DEVICE — DRSNG WND BORDR/ADHS NONADHR/GZ LF 4X4IN STRL

## (undated) DEVICE — BLANKT WARM LOWR/BDY 100X120CM

## (undated) DEVICE — GLV SURG PREMIERPRO MIC LTX PF SZ8 BRN

## (undated) DEVICE — ADHS LIQ MASTISOL 2/3ML

## (undated) DEVICE — SUCTION CANISTER 2500CC: Brand: DEROYAL

## (undated) DEVICE — PATIENT RETURN ELECTRODE, SINGLE-USE, CONTACT QUALITY MONITORING, ADULT, WITH 9FT CORD, FOR PATIENTS WEIGING OVER 33LBS. (15KG): Brand: MEGADYNE

## (undated) DEVICE — STRAP POSTN KN/BDY FM 5X72IN DISP

## (undated) DEVICE — 3M™ STERI-DRAPE™ INSTRUMENT POUCH 1018: Brand: STERI-DRAPE™

## (undated) DEVICE — BANDAGE,GAUZE,BULKEE II,4.5"X4.1YD,STRL: Brand: MEDLINE

## (undated) DEVICE — ANTIBACTERIAL UNDYED BRAIDED (POLYGLACTIN 910), SYNTHETIC ABSORBABLE SUTURE: Brand: COATED VICRYL

## (undated) DEVICE — SCRB SURG BACTOSHIELD CHG 4PCT 4OZ

## (undated) DEVICE — Device

## (undated) DEVICE — TOOL MR8-14MH30 MR8 14CM MATCH 3MM: Brand: MIDAS REX MR8

## (undated) DEVICE — SYR CONTRL LUERLOK 10CC

## (undated) DEVICE — SPNG GZ WOVN 4X4IN 12PLY 10/BX STRL

## (undated) DEVICE — STRIP,CLOSURE,WOUND,MEDI-STRIP,1/2X4: Brand: MEDLINE

## (undated) DEVICE — PK NEURO DISC 10

## (undated) DEVICE — C-ARM: Brand: UNBRANDED

## (undated) DEVICE — SKYLINE ANTERIOR CERVICAL PLATE SYSTEM DRILL 2.2 X 12MM: Brand: SKYLINE

## (undated) DEVICE — GLV SURG BIOGEL LTX PF 8

## (undated) DEVICE — APPL CHLORAPREP TINTED 26ML TEAL

## (undated) DEVICE — SPONGE,DISSECTOR,K,XRAY,9/16"X1/4",STRL: Brand: MEDLINE

## (undated) DEVICE — NDL HYPO ECLPS SFTY 25G 1 1/2IN

## (undated) DEVICE — LIMB HOLDER, WRIST/ANKLE: Brand: DEROYAL

## (undated) DEVICE — DRP MICROSCOPE 4 BINOCULAR CV 54X150IN

## (undated) DEVICE — DISPOSABLE BIPOLAR FORCEPS 7 3/4" (19.7CM) SCOVILLE BAYONET, INSULATED, 1.5MM TIP AND 12 FT. (3.6M) CABLE: Brand: KIRWAN